# Patient Record
Sex: FEMALE | Race: WHITE | NOT HISPANIC OR LATINO | Employment: FULL TIME | URBAN - METROPOLITAN AREA
[De-identification: names, ages, dates, MRNs, and addresses within clinical notes are randomized per-mention and may not be internally consistent; named-entity substitution may affect disease eponyms.]

---

## 2023-10-04 PROBLEM — R87.612 LOW GRADE SQUAMOUS INTRAEPITHELIAL LESION (LGSIL) ON CERVICAL PAP SMEAR: Status: ACTIVE | Noted: 2023-10-04

## 2023-10-06 PROBLEM — M99.04 SACROILIAC JOINT SOMATIC DYSFUNCTION: Status: ACTIVE | Noted: 2023-10-06

## 2023-10-06 PROBLEM — M79.9 POSTURAL STRAIN: Status: ACTIVE | Noted: 2023-10-06

## 2023-11-14 PROBLEM — G56.03 BILATERAL CARPAL TUNNEL SYNDROME: Status: ACTIVE | Noted: 2017-03-16

## 2023-11-14 PROBLEM — G89.29 CHRONIC BILATERAL LOW BACK PAIN WITHOUT SCIATICA: Status: ACTIVE | Noted: 2021-11-01

## 2023-11-14 PROBLEM — M19.90 INFLAMMATORY ARTHRITIS: Status: ACTIVE | Noted: 2022-12-23

## 2023-12-28 PROBLEM — F43.10 POSTTRAUMATIC STRESS DISORDER: Status: ACTIVE | Noted: 2021-06-03

## 2024-04-16 PROBLEM — M35.9 AUTOIMMUNE DISEASE (MULTI): Status: RESOLVED | Noted: 2023-12-30 | Resolved: 2024-04-16

## 2024-06-05 ENCOUNTER — TELEPHONE (OUTPATIENT)
Dept: OBSTETRICS AND GYNECOLOGY | Facility: CLINIC | Age: 34
End: 2024-06-05

## 2024-06-05 NOTE — TELEPHONE ENCOUNTER
Patient called in stating they have not heard from anyone about the referral that was put in during their last visit.

## 2024-06-18 DIAGNOSIS — R53.83 TIREDNESS: ICD-10-CM

## 2024-06-18 DIAGNOSIS — Z11.4 ENCOUNTER FOR HIV (HUMAN IMMUNODEFICIENCY VIRUS) TEST: Primary | ICD-10-CM

## 2024-06-19 PROCEDURE — 82533 TOTAL CORTISOL: CPT

## 2024-06-19 PROCEDURE — 87389 HIV-1 AG W/HIV-1&-2 AB AG IA: CPT

## 2024-06-20 DIAGNOSIS — Q67.5 CONGENITAL SCOLIOSIS: ICD-10-CM

## 2024-06-20 DIAGNOSIS — M54.42 CHRONIC LOW BACK PAIN WITH BILATERAL SCIATICA, UNSPECIFIED BACK PAIN LATERALITY: Primary | ICD-10-CM

## 2024-06-20 DIAGNOSIS — M54.41 CHRONIC LOW BACK PAIN WITH BILATERAL SCIATICA, UNSPECIFIED BACK PAIN LATERALITY: Primary | ICD-10-CM

## 2024-06-20 DIAGNOSIS — G89.29 CHRONIC LOW BACK PAIN WITH BILATERAL SCIATICA, UNSPECIFIED BACK PAIN LATERALITY: Primary | ICD-10-CM

## 2024-06-27 ENCOUNTER — ANESTHESIA EVENT (OUTPATIENT)
Dept: GASTROENTEROLOGY | Facility: HOSPITAL | Age: 34
End: 2024-06-27
Payer: COMMERCIAL

## 2024-06-27 ENCOUNTER — HOSPITAL ENCOUNTER (OUTPATIENT)
Dept: GASTROENTEROLOGY | Facility: HOSPITAL | Age: 34
Setting detail: OUTPATIENT SURGERY
Discharge: HOME | End: 2024-06-27
Payer: COMMERCIAL

## 2024-06-27 ENCOUNTER — ANESTHESIA (OUTPATIENT)
Dept: GASTROENTEROLOGY | Facility: HOSPITAL | Age: 34
End: 2024-06-27
Payer: COMMERCIAL

## 2024-06-27 VITALS
TEMPERATURE: 97.9 F | HEART RATE: 97 BPM | DIASTOLIC BLOOD PRESSURE: 73 MMHG | HEIGHT: 61 IN | WEIGHT: 293 LBS | SYSTOLIC BLOOD PRESSURE: 147 MMHG | BODY MASS INDEX: 55.32 KG/M2 | OXYGEN SATURATION: 95 % | RESPIRATION RATE: 26 BRPM

## 2024-06-27 DIAGNOSIS — R12 HEARTBURN: ICD-10-CM

## 2024-06-27 DIAGNOSIS — R11.2 NAUSEA AND VOMITING, UNSPECIFIED VOMITING TYPE: ICD-10-CM

## 2024-06-27 PROBLEM — G47.33 OSA ON CPAP: Status: ACTIVE | Noted: 2024-06-27

## 2024-06-27 PROCEDURE — 7100000009 HC PHASE TWO TIME - INITIAL BASE CHARGE

## 2024-06-27 PROCEDURE — 3700000001 HC GENERAL ANESTHESIA TIME - INITIAL BASE CHARGE

## 2024-06-27 PROCEDURE — 2500000004 HC RX 250 GENERAL PHARMACY W/ HCPCS (ALT 636 FOR OP/ED): Performed by: NURSE ANESTHETIST, CERTIFIED REGISTERED

## 2024-06-27 PROCEDURE — 2500000005 HC RX 250 GENERAL PHARMACY W/O HCPCS: Performed by: NURSE ANESTHETIST, CERTIFIED REGISTERED

## 2024-06-27 PROCEDURE — 3700000002 HC GENERAL ANESTHESIA TIME - EACH INCREMENTAL 1 MINUTE

## 2024-06-27 PROCEDURE — 7100000010 HC PHASE TWO TIME - EACH INCREMENTAL 1 MINUTE

## 2024-06-27 PROCEDURE — 2500000004 HC RX 250 GENERAL PHARMACY W/ HCPCS (ALT 636 FOR OP/ED): Performed by: STUDENT IN AN ORGANIZED HEALTH CARE EDUCATION/TRAINING PROGRAM

## 2024-06-27 PROCEDURE — 43239 EGD BIOPSY SINGLE/MULTIPLE: CPT | Performed by: STUDENT IN AN ORGANIZED HEALTH CARE EDUCATION/TRAINING PROGRAM

## 2024-06-27 RX ORDER — PROPOFOL 10 MG/ML
INJECTION, EMULSION INTRAVENOUS CONTINUOUS PRN
Status: DISCONTINUED | OUTPATIENT
Start: 2024-06-27 | End: 2024-06-27

## 2024-06-27 RX ORDER — LIDOCAINE HCL/PF 100 MG/5ML
SYRINGE (ML) INTRAVENOUS AS NEEDED
Status: DISCONTINUED | OUTPATIENT
Start: 2024-06-27 | End: 2024-06-27

## 2024-06-27 RX ORDER — SODIUM CHLORIDE, SODIUM LACTATE, POTASSIUM CHLORIDE, CALCIUM CHLORIDE 600; 310; 30; 20 MG/100ML; MG/100ML; MG/100ML; MG/100ML
20 INJECTION, SOLUTION INTRAVENOUS CONTINUOUS
Status: DISCONTINUED | OUTPATIENT
Start: 2024-06-27 | End: 2024-06-28 | Stop reason: HOSPADM

## 2024-06-27 RX ORDER — MIDAZOLAM HYDROCHLORIDE 1 MG/ML
INJECTION, SOLUTION INTRAMUSCULAR; INTRAVENOUS AS NEEDED
Status: DISCONTINUED | OUTPATIENT
Start: 2024-06-27 | End: 2024-06-27

## 2024-06-27 SDOH — HEALTH STABILITY: MENTAL HEALTH: CURRENT SMOKER: 0

## 2024-06-27 ASSESSMENT — PAIN - FUNCTIONAL ASSESSMENT
PAIN_FUNCTIONAL_ASSESSMENT: 0-10

## 2024-06-27 ASSESSMENT — PAIN SCALES - GENERAL
PAINLEVEL_OUTOF10: 0 - NO PAIN
PAIN_LEVEL: 0

## 2024-06-27 ASSESSMENT — COLUMBIA-SUICIDE SEVERITY RATING SCALE - C-SSRS
2. HAVE YOU ACTUALLY HAD ANY THOUGHTS OF KILLING YOURSELF?: NO
6. HAVE YOU EVER DONE ANYTHING, STARTED TO DO ANYTHING, OR PREPARED TO DO ANYTHING TO END YOUR LIFE?: NO
6. HAVE YOU EVER DONE ANYTHING, STARTED TO DO ANYTHING, OR PREPARED TO DO ANYTHING TO END YOUR LIFE?: NO
2. HAVE YOU ACTUALLY HAD ANY THOUGHTS OF KILLING YOURSELF?: NO
2. HAVE YOU ACTUALLY HAD ANY THOUGHTS OF KILLING YOURSELF?: NO
1. IN THE PAST MONTH, HAVE YOU WISHED YOU WERE DEAD OR WISHED YOU COULD GO TO SLEEP AND NOT WAKE UP?: NO
6. HAVE YOU EVER DONE ANYTHING, STARTED TO DO ANYTHING, OR PREPARED TO DO ANYTHING TO END YOUR LIFE?: NO
1. IN THE PAST MONTH, HAVE YOU WISHED YOU WERE DEAD OR WISHED YOU COULD GO TO SLEEP AND NOT WAKE UP?: NO
1. IN THE PAST MONTH, HAVE YOU WISHED YOU WERE DEAD OR WISHED YOU COULD GO TO SLEEP AND NOT WAKE UP?: NO

## 2024-06-27 NOTE — DISCHARGE INSTRUCTIONS

## 2024-06-27 NOTE — ANESTHESIA POSTPROCEDURE EVALUATION
"Patient: Keerthi Lui \"Sasha\"    Procedure Summary       Date: 06/27/24 Room / Location: [facility]    Anesthesia Start: 1442 Anesthesia Stop: 1500    Procedure: EGD Diagnosis:       Heartburn      Nausea and vomiting, unspecified vomiting type    Scheduled Providers: Jose Lua MD Responsible Provider: Blane Garg MD    Anesthesia Type: MAC ASA Status: 3            Anesthesia Type: MAC    Vitals Value Taken Time   /76 06/27/24 1516   Temp 36.6 °C (97.9 °F) 06/27/24 1500   Pulse 96 06/27/24 1522   Resp 23 06/27/24 1522   SpO2 97 % 06/27/24 1522   Vitals shown include unfiled device data.    Anesthesia Post Evaluation    Patient location during evaluation: PACU  Patient participation: waiting for patient participation  Level of consciousness: awake  Pain score: 0  Pain management: adequate  Airway patency: patent  Cardiovascular status: acceptable and hemodynamically stable  Respiratory status: face mask  Hydration status: acceptable  Postoperative Nausea and Vomiting: none        No notable events documented.    "

## 2024-06-27 NOTE — ANESTHESIA PREPROCEDURE EVALUATION
"Patient: Keerthi Lui \"Sasha\"    Procedure Information       Date/Time: 06/27/24 1350    Scheduled providers: Jose Lua MD    Procedure: EGD    Location: [facility]            Relevant Problems   Pulmonary   (+) MIKAELA on CPAP      Neuro   (+) Anxiety   (+) Bilateral carpal tunnel syndrome   (+) Chronic daily headache   (+) Depression   (+) PTSD (post-traumatic stress disorder)   (+) Posttraumatic stress disorder   (+) Recurrent major depressive disorder, in remission ([facility])      GI   (+) Esophageal reflux   (+) Gastroesophageal reflux disease      Endocrine   (+) Severe obesity (Multi)      Musculoskeletal   (+) Bilateral carpal tunnel syndrome   (+) Chronic low back pain with sciatica   (+) Chronic neck pain   (+) Fibromyalgia      ID   (+) Recurrent streptococcal tonsillitis   (+) Streptococcal tonsillitis       Clinical information reviewed:   Tobacco  Allergies  Meds  Problems  Med Hx  Surg Hx  OB Status    Fam Hx  Soc Hx        NPO Detail:  NPO/Void Status  Carbohydrate Drink Given Prior to Surgery? : N  Date of Last Liquid: 06/27/24  Time of Last Liquid: 1315  Date of Last Solid: 06/26/24  Time of Last Solid: 2200  Last Intake Type: Solid meal  Time of Last Void: 1323         Physical Exam    Airway  Mallampati: IV  TM distance: <3 FB  Neck ROM: full     Cardiovascular - normal exam  Rhythm: regular  Rate: normal     Dental - normal exam     Pulmonary   Breath sounds clear to auscultation  (+) decreased breath sounds     Abdominal   (+) obese             Anesthesia Plan    History of general anesthesia?: yes  History of complications of general anesthesia?: no    ASA 3     MAC     The patient is not a current smoker.  Education provided regarding risk of obstructive sleep apnea.  intravenous induction   Anesthetic plan and risks discussed with patient.  Use of blood products discussed with who consented to blood products.    Plan discussed with CRNA.      "

## 2024-06-27 NOTE — POST-PROCEDURE NOTE
Dr. Lua at bedside post procedure to speak with pt and family. Discharge instructions reviewed and provided in folder to take home.

## 2024-07-01 ENCOUNTER — INFUSION (OUTPATIENT)
Dept: INFUSION THERAPY | Facility: CLINIC | Age: 34
End: 2024-07-01
Payer: COMMERCIAL

## 2024-07-01 VITALS
HEART RATE: 94 BPM | OXYGEN SATURATION: 99 % | RESPIRATION RATE: 22 BRPM | DIASTOLIC BLOOD PRESSURE: 99 MMHG | TEMPERATURE: 98.6 F | SYSTOLIC BLOOD PRESSURE: 134 MMHG

## 2024-07-01 DIAGNOSIS — M79.7 FIBROMYALGIA: ICD-10-CM

## 2024-07-01 LAB — PREGNANCY TEST URINE, POC: NEGATIVE

## 2024-07-01 PROCEDURE — 81025 URINE PREGNANCY TEST: CPT

## 2024-07-01 PROCEDURE — 96365 THER/PROPH/DIAG IV INF INIT: CPT | Mod: INF

## 2024-07-01 PROCEDURE — 2500000004 HC RX 250 GENERAL PHARMACY W/ HCPCS (ALT 636 FOR OP/ED): Performed by: NURSE PRACTITIONER

## 2024-07-01 PROCEDURE — 96368 THER/DIAG CONCURRENT INF: CPT | Mod: INF

## 2024-07-01 PROCEDURE — 2500000005 HC RX 250 GENERAL PHARMACY W/O HCPCS: Performed by: NURSE PRACTITIONER

## 2024-07-01 PROCEDURE — 96375 TX/PRO/DX INJ NEW DRUG ADDON: CPT | Mod: INF

## 2024-07-01 RX ORDER — EPINEPHRINE 0.3 MG/.3ML
0.3 INJECTION SUBCUTANEOUS EVERY 5 MIN PRN
OUTPATIENT
Start: 2024-07-15

## 2024-07-01 RX ORDER — ALBUTEROL SULFATE 0.83 MG/ML
3 SOLUTION RESPIRATORY (INHALATION) AS NEEDED
OUTPATIENT
Start: 2024-07-15

## 2024-07-01 RX ORDER — NITROGLYCERIN 0.4 MG/1
0.4 TABLET SUBLINGUAL ONCE
OUTPATIENT
Start: 2024-07-15 | End: 2024-07-15

## 2024-07-01 RX ORDER — METOCLOPRAMIDE HYDROCHLORIDE 5 MG/ML
10 INJECTION INTRAMUSCULAR; INTRAVENOUS ONCE
OUTPATIENT
Start: 2024-07-15 | End: 2024-07-15

## 2024-07-01 RX ORDER — KETOROLAC TROMETHAMINE 30 MG/ML
30 INJECTION, SOLUTION INTRAMUSCULAR; INTRAVENOUS ONCE
OUTPATIENT
Start: 2024-07-15 | End: 2024-07-15

## 2024-07-01 RX ORDER — FAMOTIDINE 10 MG/ML
20 INJECTION INTRAVENOUS ONCE AS NEEDED
OUTPATIENT
Start: 2024-07-15

## 2024-07-01 RX ORDER — KETOROLAC TROMETHAMINE 30 MG/ML
30 INJECTION, SOLUTION INTRAMUSCULAR; INTRAVENOUS ONCE
Status: COMPLETED | OUTPATIENT
Start: 2024-07-01 | End: 2024-07-01

## 2024-07-01 RX ORDER — DIPHENHYDRAMINE HYDROCHLORIDE 50 MG/ML
50 INJECTION INTRAMUSCULAR; INTRAVENOUS AS NEEDED
OUTPATIENT
Start: 2024-07-15

## 2024-07-01 RX ORDER — METOPROLOL TARTRATE 25 MG/1
25 TABLET, FILM COATED ORAL ONCE
OUTPATIENT
Start: 2024-07-15 | End: 2024-07-15

## 2024-07-01 RX ORDER — ONDANSETRON HYDROCHLORIDE 2 MG/ML
4 INJECTION, SOLUTION INTRAVENOUS ONCE
OUTPATIENT
Start: 2024-07-15 | End: 2024-07-15

## 2024-07-01 RX ORDER — DIPHENHYDRAMINE HYDROCHLORIDE 50 MG/ML
25 INJECTION INTRAMUSCULAR; INTRAVENOUS ONCE
OUTPATIENT
Start: 2024-07-15 | End: 2024-07-15

## 2024-07-01 ASSESSMENT — ENCOUNTER SYMPTOMS
LOSS OF SENSATION IN FEET: 1
DEPRESSION: 1
OCCASIONAL FEELINGS OF UNSTEADINESS: 0

## 2024-07-01 ASSESSMENT — PAIN DESCRIPTION - LOCATION: LOCATION: GENERALIZED

## 2024-07-01 ASSESSMENT — PAIN SCALES - GENERAL
PAINLEVEL_OUTOF10: 3
PAINLEVEL_OUTOF10: 2

## 2024-07-01 NOTE — PROGRESS NOTES
S: Patient here for 19 opioid sparing pain infusion. Patient reports 75% reduction in pain after last infusion that lasted 2 weeks.    Purpose of pain infusion meds explained along with potential side effects.  Patient verbalized understanding.    B: Pain Issues - generalized    A: Patient currently has pain described on flow sheet documentation. Designated  is patient's partner Adrian.     R: Plan; Obtain IV access, do patient risk assessment, and start opioid sparing infusion as ordered. Monitoring for S/S of adverse reactions.

## 2024-07-01 NOTE — PATIENT INSTRUCTIONS
Today :We administered (ketamine 30 mg, lidocaine (Xylocaine) 20 mg/mL (2 %) 300 mg in sodium chloride 0.9% 500 mL IV), propofol (Diprivan) 100 mg in dextrose 5% 25 mL, and ketorolac.     For:   1. Fibromyalgia          Please read the  Medication Guide that was given to you and reviewed during todays visit.     (Tell all doctors including dentists that you are taking this medication)     Go to the emergency room or call 911 if:  -You have signs of allergic reaction:   -Rash, hives, itching.   -Swollen, blistered, peeling skin.   -Swelling of face, lips, mouth, tongue or throat.   -Tightness of chest, trouble breathing, swallowing or talking     Call your doctor:  - If IV / injection site gets red, warm, swollen, itchy or leaks fluid or pus.     (Leave dressing on your IV site for at least 2 hours and keep area clean and dry  - If you get sick or have symptoms of infection or are not feeling well for any reason.    (Wash your hands often, stay away from people who are sick)  - If you have side effects from your medication that do not go away or are bothersome.     (Refer to the teaching your nurse gave you for side effects to call your doctor about)    - Common side effects may include:  stuffy nose, headache, feeling tired, muscle aches, upset stomach  - Before receiving any vaccines     - Call [facility] at   If:  - You get sick, are on antibiotics, have had a recent vaccine, have surgery or dental work and your doctor wants your visit rescheduled.  - You need to cancel and reschedule your visit for any reason. Call at least 2 days before your visit if you need to cancel.   - Your insurance changes before your next visit.    (We will need to get approval from your new insurance. This can take up to two weeks.)     The [facility] is opened Monday thru Friday. We are closed on weekends and holidays.   Voice mail will take your call if the center is closed. If you leave a message  please allow 24 hours for a call back during weekdays. If you leave a message on a weekend/holiday, we will call you back the next business day.      [facility]      Pain Infusion Aftercare Instructions      1. It is normal to feel sedated, tired and low in energy after a pain infusion. DO NOT DRIVE, OPERATE ANY MACHINERY, OR MAKE ANY IMPORTANT DECISIONS FOR AT LEAST 24 HOURS AFTER THE INFUSION.     2. Call the pain center at [phone] with any problems, questions, or concerns.     3. Eat light after the infusion. If you feel queasy or sick to your stomach, laying down with your eyes closed may help. When you resume eating start with something mild like clear liquids, yogurt, applesauce, crackers, etc… Gradually advance to a regular diet.     4. Do not leave your house alone the evening of your pain infusion.     5. No alcohol or sedative medications, such as sleeping pills, for 24 hours after your pain infusion.     6. Resume all other prescribed medications unless directed otherwise by you physician.     7. If you have any medical emergencies, call 911 or go directly to the closest emergency room.

## 2024-07-09 LAB
LABORATORY COMMENT REPORT: NORMAL
PATH REPORT.FINAL DX SPEC: NORMAL
PATH REPORT.GROSS SPEC: NORMAL
PATH REPORT.RELEVANT HX SPEC: NORMAL
PATH REPORT.TOTAL CANCER: NORMAL

## 2024-07-11 DIAGNOSIS — M79.7 FIBROMYALGIA: Primary | ICD-10-CM

## 2024-07-11 RX ORDER — KETOROLAC TROMETHAMINE 30 MG/ML
30 INJECTION, SOLUTION INTRAMUSCULAR; INTRAVENOUS ONCE
OUTPATIENT
Start: 2024-07-18 | End: 2024-07-18

## 2024-07-11 RX ORDER — KETAMINE HCL IN NACL, ISO-OSM 100MG/10ML
SYRINGE (ML) INJECTION ONCE
OUTPATIENT
Start: 2024-07-18

## 2024-07-15 DIAGNOSIS — J01.10 ACUTE NON-RECURRENT FRONTAL SINUSITIS: Primary | ICD-10-CM

## 2024-07-15 DIAGNOSIS — E66.01 CLASS 3 SEVERE OBESITY DUE TO EXCESS CALORIES WITHOUT SERIOUS COMORBIDITY WITH BODY MASS INDEX (BMI) OF 50.0 TO 59.9 IN ADULT (MULTI): ICD-10-CM

## 2024-07-15 RX ORDER — AZITHROMYCIN 250 MG/1
TABLET, FILM COATED ORAL
Qty: 6 TABLET | Refills: 0 | Status: SHIPPED | OUTPATIENT
Start: 2024-07-15 | End: 2024-07-20

## 2024-07-16 ENCOUNTER — APPOINTMENT (OUTPATIENT)
Dept: NEUROSURGERY | Facility: CLINIC | Age: 34
End: 2024-07-16
Payer: COMMERCIAL

## 2024-07-17 PROBLEM — F41.1 GENERALIZED ANXIETY DISORDER: Status: ACTIVE | Noted: 2021-06-03

## 2024-07-17 PROBLEM — F34.1 DYSTHYMIC DISORDER: Status: ACTIVE | Noted: 2021-06-03

## 2024-07-18 ENCOUNTER — INFUSION (OUTPATIENT)
Dept: INFUSION THERAPY | Facility: CLINIC | Age: 34
End: 2024-07-18
Payer: COMMERCIAL

## 2024-07-18 VITALS
RESPIRATION RATE: 17 BRPM | SYSTOLIC BLOOD PRESSURE: 148 MMHG | OXYGEN SATURATION: 97 % | TEMPERATURE: 97.3 F | HEART RATE: 96 BPM | DIASTOLIC BLOOD PRESSURE: 90 MMHG

## 2024-07-18 DIAGNOSIS — M79.7 FIBROMYALGIA: ICD-10-CM

## 2024-07-18 LAB — PREGNANCY TEST URINE, POC: NEGATIVE

## 2024-07-18 PROCEDURE — 96368 THER/DIAG CONCURRENT INF: CPT | Mod: INF

## 2024-07-18 PROCEDURE — 2500000005 HC RX 250 GENERAL PHARMACY W/O HCPCS: Performed by: NURSE PRACTITIONER

## 2024-07-18 PROCEDURE — 96365 THER/PROPH/DIAG IV INF INIT: CPT | Mod: INF

## 2024-07-18 PROCEDURE — 96375 TX/PRO/DX INJ NEW DRUG ADDON: CPT | Mod: INF

## 2024-07-18 PROCEDURE — 2500000004 HC RX 250 GENERAL PHARMACY W/ HCPCS (ALT 636 FOR OP/ED): Performed by: NURSE PRACTITIONER

## 2024-07-18 PROCEDURE — 81025 URINE PREGNANCY TEST: CPT | Performed by: NURSE PRACTITIONER

## 2024-07-18 RX ORDER — DIPHENHYDRAMINE HYDROCHLORIDE 50 MG/ML
50 INJECTION INTRAMUSCULAR; INTRAVENOUS AS NEEDED
OUTPATIENT
Start: 2024-08-01

## 2024-07-18 RX ORDER — KETOROLAC TROMETHAMINE 30 MG/ML
30 INJECTION, SOLUTION INTRAMUSCULAR; INTRAVENOUS ONCE
Status: COMPLETED | OUTPATIENT
Start: 2024-07-18 | End: 2024-07-18

## 2024-07-18 RX ORDER — KETOROLAC TROMETHAMINE 30 MG/ML
30 INJECTION, SOLUTION INTRAMUSCULAR; INTRAVENOUS ONCE
Status: CANCELLED | OUTPATIENT
Start: 2024-08-01 | End: 2024-08-01

## 2024-07-18 RX ORDER — FAMOTIDINE 10 MG/ML
20 INJECTION INTRAVENOUS ONCE AS NEEDED
OUTPATIENT
Start: 2024-08-01

## 2024-07-18 RX ORDER — KETAMINE HCL IN NACL, ISO-OSM 100MG/10ML
SYRINGE (ML) INJECTION ONCE
Status: CANCELLED | OUTPATIENT
Start: 2024-08-01

## 2024-07-18 RX ORDER — ONDANSETRON HYDROCHLORIDE 2 MG/ML
4 INJECTION, SOLUTION INTRAVENOUS ONCE
OUTPATIENT
Start: 2024-08-01 | End: 2024-08-01

## 2024-07-18 RX ORDER — ALBUTEROL SULFATE 0.83 MG/ML
3 SOLUTION RESPIRATORY (INHALATION) AS NEEDED
OUTPATIENT
Start: 2024-08-01

## 2024-07-18 RX ORDER — DIPHENHYDRAMINE HYDROCHLORIDE 50 MG/ML
25 INJECTION INTRAMUSCULAR; INTRAVENOUS ONCE
OUTPATIENT
Start: 2024-08-01 | End: 2024-08-01

## 2024-07-18 RX ORDER — EPINEPHRINE 0.3 MG/.3ML
0.3 INJECTION SUBCUTANEOUS EVERY 5 MIN PRN
OUTPATIENT
Start: 2024-08-01

## 2024-07-18 RX ORDER — METOPROLOL TARTRATE 25 MG/1
25 TABLET, FILM COATED ORAL ONCE
OUTPATIENT
Start: 2024-08-01 | End: 2024-08-01

## 2024-07-18 RX ORDER — KETAMINE HCL IN NACL, ISO-OSM 100MG/10ML
SYRINGE (ML) INJECTION ONCE
Status: COMPLETED | OUTPATIENT
Start: 2024-07-18 | End: 2024-07-18

## 2024-07-18 RX ORDER — NITROGLYCERIN 0.4 MG/1
0.4 TABLET SUBLINGUAL ONCE
OUTPATIENT
Start: 2024-08-01 | End: 2024-08-01

## 2024-07-18 RX ORDER — METOCLOPRAMIDE HYDROCHLORIDE 5 MG/ML
10 INJECTION INTRAMUSCULAR; INTRAVENOUS ONCE
OUTPATIENT
Start: 2024-08-01 | End: 2024-08-01

## 2024-07-18 ASSESSMENT — ENCOUNTER SYMPTOMS
DEPRESSION: 1
LOSS OF SENSATION IN FEET: 1
OCCASIONAL FEELINGS OF UNSTEADINESS: 0

## 2024-07-18 ASSESSMENT — PAIN - FUNCTIONAL ASSESSMENT: PAIN_FUNCTIONAL_ASSESSMENT: 0-10

## 2024-07-18 ASSESSMENT — PAIN DESCRIPTION - DESCRIPTORS: DESCRIPTORS: ACHING;SORE

## 2024-07-18 ASSESSMENT — PAIN SCALES - GENERAL
PAINLEVEL_OUTOF10: 3
PAINLEVEL_OUTOF10: 0 - NO PAIN
PAINLEVEL_OUTOF10: 3

## 2024-07-18 NOTE — PROGRESS NOTES
Post infusion teaching provided. Patient verbalized understanding. VSS, Patient states pain is zero and tolerated pain infusion well without difficulty.

## 2024-07-18 NOTE — PROGRESS NOTES
S: Patient here for #20 opioid sparing pain infusion. Patient reports 50-65% reduction in pain after last infusion that lasted 1.5 weeks.    Purpose of pain infusion meds explained along with potential side effects.  Patient verbalized understanding.    B: Pain Issues    A: Patient currently has pain described on flow sheet documentation. Designated  is Adrian. Patient last ate solid food >10 hours ago, and had liquid >10 hours ago.    R: Plan; Obtain IV access, do patient risk assessment, and start opioid sparing infusion as ordered. Monitoring for S/S of adverse reactions.

## 2024-07-18 NOTE — PATIENT INSTRUCTIONS
[facility]      Pain Infusion Aftercare Instructions      1. It is normal to feel sedated, tired and low in energy after a pain infusion. DO NOT DRIVE, OPERATE ANY MACHINERY, OR MAKE ANY IMPORTANT DECISIONS FOR AT LEAST 24 HOURS AFTER THE INFUSION.     2. Call the pain center at [phone] with any problems, questions, or concerns.     3. Eat light after the infusion. If you feel queasy or sick to your stomach, laying down with your eyes closed may help. When you resume eating start with something mild like clear liquids, yogurt, applesauce, crackers, etc… Gradually advance to a regular diet.     4. Do not leave your house alone the evening of your pain infusion.     5. No alcohol or sedative medications, such as sleeping pills, for 24 hours after your pain infusion.     6. Resume all other prescribed medications unless directed otherwise by you physician.     7. If you have any medical emergencies, call 911 or go directly to the closest emergency room.Today :We administered ketamine 30 mg-lidocaine 300 mg, propofol, and ketorolac.     For:   1. Fibromyalgia         Your next appointment is due in:  8/1/24 pain infusion        Please read the  Medication Guide that was given to you and reviewed during todays visit.     (Tell all doctors including dentists that you are taking this medication)     Go to the emergency room or call 911 if:  -You have signs of allergic reaction:   -Rash, hives, itching.   -Swollen, blistered, peeling skin.   -Swelling of face, lips, mouth, tongue or throat.   -Tightness of chest, trouble breathing, swallowing or talking     Call your doctor:  - If IV / injection site gets red, warm, swollen, itchy or leaks fluid or pus.     (Leave dressing on your IV site for at least 2 hours and keep area clean and dry  - If you get sick or have symptoms of infection or are not feeling well for any reason.    (Wash your hands often, stay away from people who are sick)  -  If you have side effects from your medication that do not go away or are bothersome.     (Refer to the teaching your nurse gave you for side effects to call your doctor about)    - Common side effects may include:  stuffy nose, headache, feeling tired, muscle aches, upset stomach  - Before receiving any vaccines     - Call [facility] at   If:  - You get sick, are on antibiotics, have had a recent vaccine, have surgery or dental work and your doctor wants your visit rescheduled.  - You need to cancel and reschedule your visit for any reason. Call at least 2 days before your visit if you need to cancel.   - Your insurance changes before your next visit.    (We will need to get approval from your new insurance. This can take up to two weeks.)     [facility] is opened Monday thru Friday. We are closed on weekends and holidays.   Voice mail will take your call if the center is closed. If you leave a message please allow 24 hours for a call back during weekdays. If you leave a message on a weekend/holiday, we will call you back the next business day.

## 2024-07-23 ENCOUNTER — TELEPHONE (OUTPATIENT)
Dept: OBSTETRICS AND GYNECOLOGY | Facility: CLINIC | Age: 34
End: 2024-07-23
Payer: COMMERCIAL

## 2024-07-23 NOTE — TELEPHONE ENCOUNTER
Sent patient a [employer] message to notify her that she needs to make appointments when she would like to be tested.

## 2024-07-25 ENCOUNTER — APPOINTMENT (OUTPATIENT)
Dept: INTEGRATIVE MEDICINE | Facility: CLINIC | Age: 34
End: 2024-07-25
Payer: COMMERCIAL

## 2024-07-25 ENCOUNTER — ALLIED HEALTH (OUTPATIENT)
Dept: INTEGRATIVE MEDICINE | Facility: CLINIC | Age: 34
End: 2024-07-25
Payer: COMMERCIAL

## 2024-07-25 DIAGNOSIS — M54.2 CHRONIC NECK PAIN: ICD-10-CM

## 2024-07-25 DIAGNOSIS — M99.04 SACROILIAC JOINT SOMATIC DYSFUNCTION: ICD-10-CM

## 2024-07-25 DIAGNOSIS — M54.40 CHRONIC LOW BACK PAIN WITH SCIATICA, SCIATICA LATERALITY UNSPECIFIED, UNSPECIFIED BACK PAIN LATERALITY: ICD-10-CM

## 2024-07-25 DIAGNOSIS — M79.7 FIBROMYALGIA: ICD-10-CM

## 2024-07-25 DIAGNOSIS — G89.29 CHRONIC LOW BACK PAIN WITH SCIATICA, SCIATICA LATERALITY UNSPECIFIED, UNSPECIFIED BACK PAIN LATERALITY: ICD-10-CM

## 2024-07-25 DIAGNOSIS — M99.01 SOMATIC DYSFUNCTION OF CERVICAL REGION: Primary | ICD-10-CM

## 2024-07-25 DIAGNOSIS — M99.02 SOMATIC DYSFUNCTION OF THORACIC REGION: ICD-10-CM

## 2024-07-25 DIAGNOSIS — G43.001 MIGRAINE WITHOUT AURA AND WITH STATUS MIGRAINOSUS, NOT INTRACTABLE: ICD-10-CM

## 2024-07-25 DIAGNOSIS — M99.03 SOMATIC DYSFUNCTION OF LUMBAR REGION: ICD-10-CM

## 2024-07-25 DIAGNOSIS — G89.29 CHRONIC NECK PAIN: ICD-10-CM

## 2024-07-25 PROCEDURE — 98941 CHIROPRACT MANJ 3-4 REGIONS: CPT | Performed by: CHIROPRACTOR

## 2024-07-25 ASSESSMENT — ENCOUNTER SYMPTOMS
FREQUENCY: 0
CHEST TIGHTNESS: 0
DIARRHEA: 0
ABDOMINAL PAIN: 0
CONSTIPATION: 0
FEVER: 0
TROUBLE SWALLOWING: 0
FATIGUE: 0
CONFUSION: 0
JOINT SWELLING: 0

## 2024-07-25 NOTE — PROGRESS NOTES
Subjective   Patient ID: Sasha Longoria is a 33 y.o. female who presents today for full spine complaints, fibromyalgia.    18 overall for 2024 6/30 vpcy with new insurance as of 05/14/2024    HPI -the patient reports that she continues to experience neck, upper back, lower back and hip pain.  She reports feeling very sore today.  She reports that she had a massage in the time between today and her last appointment and recalls being very tender to palpation during the massage.  She will typically receive a myofascial release type of massage which she finds more helpful than any other style.  Her headaches persist.  Her Botox treatment will begin in August.    (10/05/23: the patient presents today per the referral of Dr. Suárez and pain management for evaluation and and management of chronic full spine complaints.  She has in the past been diagnosed with fibromyalgia.  She has dealt with pain for several years and has received chiropractic care in the past.  She did have mixed results with chiropractic care in the past, reporting a variation of instrument assisted manipulation and manual manipulation.  But she wished to attend chiropractic care again through a hospital-based provider.  She is under the care of pain management and will be starting infusions next week.  She is also on the wait list for infusions at [facility] if needed.  Overall, her pain levels remain moderate to severe.  Her pain is constant.  Symptoms in the lower back and hips seem to be the area of most intensity.  But she does continue to experience pain throughout the spine.  She experiences paresthesias in the upper and lower extremities bilaterally.  She has difficulty finding any comfortable positions.)    Review of Systems   Constitutional:  Negative for fatigue and fever.   HENT:  Negative for trouble swallowing.    Eyes:  Negative for visual disturbance.   Respiratory:  Negative for chest tightness.    Cardiovascular:   Negative for chest pain and leg swelling.   Gastrointestinal:  Negative for abdominal pain, constipation and diarrhea.   Genitourinary:  Negative for frequency.   Musculoskeletal:  Negative for joint swelling.   Neurological:  Negative for syncope.   Psychiatric/Behavioral:  Negative for confusion.    All other systems reviewed and are negative.      Objective     The patient is alert and oriented x 3 FHC.  Cranial nerves II-XII are grossly intact.  Difficulty with transitional movements is observed.  Rounded shoulders.  Increased thoracic kyphosis.  Increased lumbar lordosis.  Elevated left iliac crest.      Moderate-severe hypertonicity and tenderness is present in the suboccipitals, cervical paraspinals (L>R), scalenes, SCM, upper trapezius, levator scapula, rhomboids, thoracic paraspinals, lumbar paraspinals, quadratus lumborum, upper gluteals, piriformis, hamstrings, TFL.     Segmental joint dysfunction was assessed with motion palpation and is identified in the following areas:  Cervical: C2/3  Thoracic: T3/4, T4/5, T/6  Lumbopelvic: L4/5, L5/S1, Bilateral SI (PI on right).    Extremities: B femoracetabular joints, R>L.      Assessment/Plan   See diagnoses.  She is dealing with a chronic condition.    (Today's presentation is consistent with fibromyalgia/myofascial pain syndrome alongside postural strain and somatic dysfunction contributing to her pain syndrome.  Complicating factors include chronicity of symptoms as well as body habitus.  We will implement a trial of care to include various manipulative techniques which will range from instrument assisted to diversified.  We will utilize soft tissue techniques such as active release technique to the cervical spine musculature.  We will closely monitor their response to care to determine if any changes need to occur, if advanced imaging is needed, or if referral to other providers is appropriate.     She will also be receiving acupuncture and integrative  medicine consultation which is appropriate when considering her overall presentation.)    Today's treatment:  Pelvic blocking applied to left SI joint.  Low force P-A applied to the right SI joint.  Manual traction to lumbar spine.  Diversified manipulation to the involved cervical and thoracic segments.  Set-up tolerated prior to cervical spine manipulation.  LAD applied to B hips.    Integrative dry needling applied to B cervical paraspinals and  upper trapezii for 5 minutes (6 in/out).    (Advise patient on thoracic extension exercises.)     Treatment Plan:   The patient tolerated today's treatment with little or no additional discomfort and was instructed to contact the office for questions or concerns. Return within 3 weeks, sooner if needed.   Patient was informed of my leaving the office.  I will attempt to see her 1 more time before I leave and I also recommended she continue care with Dr. Tejada.      This chart note was generated using dictation software, and as such, there may be typographical errors present. Abbreviations may include CS for cervical spine, TS for thoracic spine, and LS for lumbar spine.

## 2024-07-29 RX ORDER — KETAMINE HCL IN NACL, ISO-OSM 100MG/10ML
SYRINGE (ML) INJECTION ONCE
Status: CANCELLED | OUTPATIENT
Start: 2024-08-01

## 2024-07-29 RX ORDER — KETOROLAC TROMETHAMINE 30 MG/ML
30 INJECTION, SOLUTION INTRAMUSCULAR; INTRAVENOUS ONCE
Status: CANCELLED | OUTPATIENT
Start: 2024-08-01 | End: 2024-08-01

## 2024-07-30 ENCOUNTER — APPOINTMENT (OUTPATIENT)
Dept: INTEGRATIVE MEDICINE | Facility: CLINIC | Age: 34
End: 2024-07-30
Payer: COMMERCIAL

## 2024-07-30 DIAGNOSIS — M54.2 CHRONIC NECK PAIN: ICD-10-CM

## 2024-07-30 DIAGNOSIS — M54.40 CHRONIC LOW BACK PAIN WITH SCIATICA, SCIATICA LATERALITY UNSPECIFIED, UNSPECIFIED BACK PAIN LATERALITY: ICD-10-CM

## 2024-07-30 DIAGNOSIS — M25.551 BILATERAL HIP PAIN: ICD-10-CM

## 2024-07-30 DIAGNOSIS — M25.552 BILATERAL HIP PAIN: ICD-10-CM

## 2024-07-30 DIAGNOSIS — M99.02 SOMATIC DYSFUNCTION OF THORACIC REGION: ICD-10-CM

## 2024-07-30 DIAGNOSIS — M99.03 SOMATIC DYSFUNCTION OF LUMBAR REGION: ICD-10-CM

## 2024-07-30 DIAGNOSIS — M99.04 SACROILIAC JOINT SOMATIC DYSFUNCTION: ICD-10-CM

## 2024-07-30 DIAGNOSIS — G89.29 CHRONIC NECK PAIN: ICD-10-CM

## 2024-07-30 DIAGNOSIS — G89.29 CHRONIC LOW BACK PAIN WITH SCIATICA, SCIATICA LATERALITY UNSPECIFIED, UNSPECIFIED BACK PAIN LATERALITY: ICD-10-CM

## 2024-07-30 DIAGNOSIS — M99.01 SOMATIC DYSFUNCTION OF CERVICAL REGION: Primary | ICD-10-CM

## 2024-07-30 PROCEDURE — 98941 CHIROPRACT MANJ 3-4 REGIONS: CPT | Performed by: CHIROPRACTOR

## 2024-07-30 ASSESSMENT — ENCOUNTER SYMPTOMS
FEVER: 0
TROUBLE SWALLOWING: 0
ABDOMINAL PAIN: 0
FATIGUE: 0
CHEST TIGHTNESS: 0
CONSTIPATION: 0
DIARRHEA: 0
JOINT SWELLING: 0
FREQUENCY: 0
CONFUSION: 0

## 2024-07-30 NOTE — PROGRESS NOTES
Subjective   Patient ID: Sasha Longoria is a 33 y.o. female who presents today for full spine complaints, fibromyalgia.    19 overall for 2024 7/30 vpcy with new insurance as of 05/14/2024    HPI -the patient reports that she is feeling similar to last visit.  She continues to feel very stiff and sore throughout the spine, especially in the neck.  She did have a recent massage session and recalls it being difficult based on cervical spine tenderness.  Headaches persist.  She will begin Botox injections in late August.  She continues with infusion therapy as well.    (10/05/23: the patient presents today per the referral of Dr. Suárez and pain management for evaluation and and management of chronic full spine complaints.  She has in the past been diagnosed with fibromyalgia.  She has dealt with pain for several years and has received chiropractic care in the past.  She did have mixed results with chiropractic care in the past, reporting a variation of instrument assisted manipulation and manual manipulation.  But she wished to attend chiropractic care again through a hospital-based provider.  She is under the care of pain management and will be starting infusions next week.  She is also on the wait list for infusions at [facility] if needed.  Overall, her pain levels remain moderate to severe.  Her pain is constant.  Symptoms in the lower back and hips seem to be the area of most intensity.  But she does continue to experience pain throughout the spine.  She experiences paresthesias in the upper and lower extremities bilaterally.  She has difficulty finding any comfortable positions.)    Review of Systems   Constitutional:  Negative for fatigue and fever.   HENT:  Negative for trouble swallowing.    Eyes:  Negative for visual disturbance.   Respiratory:  Negative for chest tightness.    Cardiovascular:  Negative for chest pain and leg swelling.   Gastrointestinal:  Negative for abdominal pain,  constipation and diarrhea.   Genitourinary:  Negative for frequency.   Musculoskeletal:  Negative for joint swelling.   Neurological:  Negative for syncope.   Psychiatric/Behavioral:  Negative for confusion.    All other systems reviewed and are negative.      Objective     The patient is alert and oriented x 3 FHC.  Cranial nerves II-XII are grossly intact.  Difficulty with transitional movements is observed.  Rounded shoulders.  Increased thoracic kyphosis.  Increased lumbar lordosis.  Elevated left iliac crest.      Moderate-severe hypertonicity and tenderness is present in the suboccipitals, cervical paraspinals (L>R), scalenes, SCM, upper trapezius, levator scapula, rhomboids, thoracic paraspinals, lumbar paraspinals, quadratus lumborum, upper gluteals, piriformis, hamstrings, TFL.     Segmental joint dysfunction was assessed with motion palpation and is identified in the following areas:  Cervical: C2/3  Thoracic: T3/4, T4/5, T/6  Lumbopelvic: L4/5, L5/S1, Bilateral SI (PI on right).    Extremities: B femoracetabular joints, R>L.      Assessment/Plan   See diagnoses.  She is dealing with a chronic condition.    (Today's presentation is consistent with fibromyalgia/myofascial pain syndrome alongside postural strain and somatic dysfunction contributing to her pain syndrome.  Complicating factors include chronicity of symptoms as well as body habitus.  We will implement a trial of care to include various manipulative techniques which will range from instrument assisted to diversified.  We will utilize soft tissue techniques such as active release technique to the cervical spine musculature.  We will closely monitor their response to care to determine if any changes need to occur, if advanced imaging is needed, or if referral to other providers is appropriate.     She will also be receiving acupuncture and integrative medicine consultation which is appropriate when considering her overall presentation.)    Today's  treatment:  Pelvic blocking applied to right SI joint.  Low force P-A applied to the right SI joint.  Manual traction to lumbar spine.  Diversified manipulation to the involved cervical and thoracic segments.  Set-up tolerated prior to cervical spine manipulation.  LAD applied to B hips.    Integrative dry needling applied to B cervical paraspinals and  upper trapezii for 5 minutes (4 in/out). Very reactive to IDN today.    (Advise patient on thoracic extension exercises.)     Treatment Plan:   The patient tolerated today's treatment with little or no additional discomfort and was instructed to contact the office for questions or concerns. Return within 3 weeks, sooner if needed.   (Patient was informed of my leaving the office.  I will attempt to see her 1 more time before I leave and I also recommended she continue care with Dr. Tejada.)      This chart note was generated using dictation software, and as such, there may be typographical errors present. Abbreviations may include CS for cervical spine, TS for thoracic spine, and LS for lumbar spine.

## 2024-08-01 ENCOUNTER — INFUSION (OUTPATIENT)
Dept: INFUSION THERAPY | Facility: CLINIC | Age: 34
End: 2024-08-01
Payer: COMMERCIAL

## 2024-08-01 VITALS
RESPIRATION RATE: 16 BRPM | TEMPERATURE: 97.7 F | OXYGEN SATURATION: 95 % | DIASTOLIC BLOOD PRESSURE: 83 MMHG | SYSTOLIC BLOOD PRESSURE: 124 MMHG | HEART RATE: 97 BPM

## 2024-08-01 DIAGNOSIS — M79.7 FIBROMYALGIA: ICD-10-CM

## 2024-08-01 LAB — PREGNANCY TEST URINE, POC: NEGATIVE

## 2024-08-01 PROCEDURE — 2500000005 HC RX 250 GENERAL PHARMACY W/O HCPCS: Performed by: NURSE PRACTITIONER

## 2024-08-01 PROCEDURE — 96368 THER/DIAG CONCURRENT INF: CPT | Mod: INF

## 2024-08-01 PROCEDURE — 96365 THER/PROPH/DIAG IV INF INIT: CPT | Mod: INF

## 2024-08-01 PROCEDURE — 81025 URINE PREGNANCY TEST: CPT

## 2024-08-01 PROCEDURE — 2500000004 HC RX 250 GENERAL PHARMACY W/ HCPCS (ALT 636 FOR OP/ED): Performed by: NURSE PRACTITIONER

## 2024-08-01 PROCEDURE — 96375 TX/PRO/DX INJ NEW DRUG ADDON: CPT | Mod: INF

## 2024-08-01 RX ORDER — DIPHENHYDRAMINE HYDROCHLORIDE 50 MG/ML
25 INJECTION INTRAMUSCULAR; INTRAVENOUS ONCE
OUTPATIENT
Start: 2024-08-15 | End: 2024-08-15

## 2024-08-01 RX ORDER — ALBUTEROL SULFATE 0.83 MG/ML
3 SOLUTION RESPIRATORY (INHALATION) AS NEEDED
OUTPATIENT
Start: 2024-08-15

## 2024-08-01 RX ORDER — DIPHENHYDRAMINE HYDROCHLORIDE 50 MG/ML
50 INJECTION INTRAMUSCULAR; INTRAVENOUS AS NEEDED
OUTPATIENT
Start: 2024-08-15

## 2024-08-01 RX ORDER — KETOROLAC TROMETHAMINE 30 MG/ML
30 INJECTION, SOLUTION INTRAMUSCULAR; INTRAVENOUS ONCE
Status: COMPLETED | OUTPATIENT
Start: 2024-08-01 | End: 2024-08-01

## 2024-08-01 RX ORDER — METOCLOPRAMIDE HYDROCHLORIDE 5 MG/ML
10 INJECTION INTRAMUSCULAR; INTRAVENOUS ONCE
OUTPATIENT
Start: 2024-08-15 | End: 2024-08-15

## 2024-08-01 RX ORDER — KETAMINE HCL IN NACL, ISO-OSM 100MG/10ML
SYRINGE (ML) INJECTION ONCE
OUTPATIENT
Start: 2024-08-15

## 2024-08-01 RX ORDER — EPINEPHRINE 0.3 MG/.3ML
0.3 INJECTION SUBCUTANEOUS EVERY 5 MIN PRN
OUTPATIENT
Start: 2024-08-15

## 2024-08-01 RX ORDER — METOPROLOL TARTRATE 25 MG/1
25 TABLET, FILM COATED ORAL ONCE
OUTPATIENT
Start: 2024-08-15 | End: 2024-08-15

## 2024-08-01 RX ORDER — NITROGLYCERIN 0.4 MG/1
0.4 TABLET SUBLINGUAL ONCE
OUTPATIENT
Start: 2024-08-15 | End: 2024-08-15

## 2024-08-01 RX ORDER — FAMOTIDINE 10 MG/ML
20 INJECTION INTRAVENOUS ONCE AS NEEDED
OUTPATIENT
Start: 2024-08-15

## 2024-08-01 RX ORDER — KETOROLAC TROMETHAMINE 30 MG/ML
30 INJECTION, SOLUTION INTRAMUSCULAR; INTRAVENOUS ONCE
OUTPATIENT
Start: 2024-08-15 | End: 2024-08-15

## 2024-08-01 RX ORDER — KETAMINE HCL IN NACL, ISO-OSM 100MG/10ML
SYRINGE (ML) INJECTION ONCE
Status: COMPLETED | OUTPATIENT
Start: 2024-08-01 | End: 2024-08-01

## 2024-08-01 RX ORDER — ONDANSETRON HYDROCHLORIDE 2 MG/ML
4 INJECTION, SOLUTION INTRAVENOUS ONCE
OUTPATIENT
Start: 2024-08-15 | End: 2024-08-15

## 2024-08-01 ASSESSMENT — COLUMBIA-SUICIDE SEVERITY RATING SCALE - C-SSRS
1. IN THE PAST MONTH, HAVE YOU WISHED YOU WERE DEAD OR WISHED YOU COULD GO TO SLEEP AND NOT WAKE UP?: NO
6. HAVE YOU EVER DONE ANYTHING, STARTED TO DO ANYTHING, OR PREPARED TO DO ANYTHING TO END YOUR LIFE?: NO
6. HAVE YOU EVER DONE ANYTHING, STARTED TO DO ANYTHING, OR PREPARED TO DO ANYTHING TO END YOUR LIFE?: NO
2. HAVE YOU ACTUALLY HAD ANY THOUGHTS OF KILLING YOURSELF?: NO

## 2024-08-01 ASSESSMENT — PAIN SCALES - GENERAL
PAINLEVEL_OUTOF10: 5 - MODERATE PAIN
PAINLEVEL_OUTOF10: 2
PAINLEVEL_OUTOF10: 5 - MODERATE PAIN

## 2024-08-01 ASSESSMENT — ENCOUNTER SYMPTOMS
DEPRESSION: 1
OCCASIONAL FEELINGS OF UNSTEADINESS: 1
LOSS OF SENSATION IN FEET: 1

## 2024-08-01 NOTE — PATIENT INSTRUCTIONS
Today :Sasha Longoria had no medications administered during this visit.     For:   1. Fibromyalgia              (Tell all doctors including dentists that you are taking this medication)     Go to the emergency room or call 911 if:  -You have signs of allergic reaction:   -Rash, hives, itching.   -Swollen, blistered, peeling skin.   -Swelling of face, lips, mouth, tongue or throat.   -Tightness of chest, trouble breathing, swallowing or talking     Call your doctor:  - If IV / injection site gets red, warm, swollen, itchy or leaks fluid or pus.     (Leave dressing on your IV site for at least 2 hours and keep area clean and dry  - If you get sick or have symptoms of infection or are not feeling well for any reason.    (Wash your hands often, stay away from people who are sick)  - If you have side effects from your medication that do not go away or are bothersome.     (Refer to the teaching your nurse gave you for side effects to call your doctor about)    - Common side effects may include:  stuffy nose, headache, feeling tired, muscle aches, upset stomach  - Before receiving any vaccines     - Call [facility] at   If:  - You get sick, are on antibiotics, have had a recent vaccine, have surgery or dental work and your doctor wants your visit rescheduled.  - You need to cancel and reschedule your visit for any reason. Call at least 2 days before your visit if you need to cancel.   - Your insurance changes before your next visit.    (We will need to get approval from your new insurance. This can take up to two weeks.)     The [facility] is opened Monday thru Friday. We are closed on weekends and holidays.   Voice mail will take your call if the center is closed. If you leave a message please allow 24 hours for a call back during weekdays. If you leave a message on a weekend/holiday, we will call you back the next business day.              [facility] OUTPATIENT [place]      Pain Infusion  Aftercare Instructions      1. It is normal to feel sedated, tired and low in energy after a pain infusion. DO NOT DRIVE, OPERATE ANY MACHINERY, OR MAKE ANY IMPORTANT DECISIONS FOR AT LEAST 24 HOURS AFTER THE INFUSION.     2. Call the pain center at [phone] with any problems, questions, or concerns.     3. Eat light after the infusion. If you feel queasy or sick to your stomach, laying down with your eyes closed may help. When you resume eating start with something mild like clear liquids, yogurt, applesauce, crackers, etc… Gradually advance to a regular diet.     4. Do not leave your house alone the evening of your pain infusion.     5. No alcohol or sedative medications, such as sleeping pills, for 24 hours after your pain infusion.     6. Resume all other prescribed medications unless directed otherwise by you physician.     7. If you have any medical emergencies, call 911 or go directly to the closest emergency room.

## 2024-08-02 ENCOUNTER — TELEMEDICINE (OUTPATIENT)
Dept: OBSTETRICS AND GYNECOLOGY | Facility: CLINIC | Age: 34
End: 2024-08-02
Payer: COMMERCIAL

## 2024-08-02 VITALS — HEIGHT: 61 IN | BODY MASS INDEX: 55.32 KG/M2 | WEIGHT: 293 LBS

## 2024-08-02 DIAGNOSIS — N89.8 VAGINAL IRRITATION: Primary | ICD-10-CM

## 2024-08-02 PROCEDURE — 3008F BODY MASS INDEX DOCD: CPT | Performed by: ADVANCED PRACTICE MIDWIFE

## 2024-08-02 PROCEDURE — 1036F TOBACCO NON-USER: CPT | Performed by: ADVANCED PRACTICE MIDWIFE

## 2024-08-02 PROCEDURE — 99212 OFFICE O/P EST SF 10 MIN: CPT | Performed by: ADVANCED PRACTICE MIDWIFE

## 2024-08-02 RX ORDER — TERCONAZOLE 4 MG/G
1 CREAM VAGINAL NIGHTLY
Qty: 45 G | Refills: 1 | Status: SHIPPED | OUTPATIENT
Start: 2024-08-02 | End: 2024-08-09

## 2024-08-02 RX ORDER — LANOLIN ALCOHOL/MO/W.PET/CERES
CREAM (GRAM) TOPICAL
COMMUNITY
Start: 2024-05-09

## 2024-08-02 RX ORDER — CLOBETASOL PROPIONATE 0.5 MG/G
OINTMENT TOPICAL 2 TIMES DAILY
Qty: 60 G | Refills: 1 | Status: SHIPPED | OUTPATIENT
Start: 2024-08-02 | End: 2024-08-02 | Stop reason: WASHOUT

## 2024-08-02 NOTE — PROGRESS NOTES
"Chief complaint: vaginal irritation  HPI: Vaginal irritation intermittently for the a couple of weeks. Sensitive skin in vaginal area, typically uses Clobetasol, but not helping this time. Related symptoms pain with urination and intercourse, scant blood on tissue with wiping x 1. Previously waxed area no does \"sugaring\" for hair removal has not done that in 2 months . Was on abx for sinus infection started July 15  LMP: Absent   Sexually active: Yes, [facility]   ROS: all negative     PE:Constitutional: no acute distress, well nourished, alert and oriented  Head and Neck: normocephalic  Lungs: respirations unlabored  Neuropsych: normal mood affect, well groomed good eye contact  Muskuloskeletal: normal posture  A: yeast candidiasis secondary to abx  P; terazole 7 day cream can not  take diflucan \"gives stomachache:\"      Refill on clobetesol per pt request         "

## 2024-08-06 ENCOUNTER — APPOINTMENT (OUTPATIENT)
Dept: INTEGRATIVE MEDICINE | Facility: CLINIC | Age: 34
End: 2024-08-06
Payer: COMMERCIAL

## 2024-08-06 DIAGNOSIS — Z71.3 NUTRITIONAL COUNSELING: Primary | ICD-10-CM

## 2024-08-06 DIAGNOSIS — M79.7 FIBROMYALGIA: ICD-10-CM

## 2024-08-06 DIAGNOSIS — R51.9 CHRONIC DAILY HEADACHE: ICD-10-CM

## 2024-08-06 DIAGNOSIS — Z91.89 AT RISK FOR NAUSEA: Primary | ICD-10-CM

## 2024-08-06 PROCEDURE — 99213 OFFICE O/P EST LOW 20 MIN: CPT | Performed by: NURSE PRACTITIONER

## 2024-08-06 RX ORDER — ONDANSETRON 4 MG/1
8 TABLET, FILM COATED ORAL EVERY 8 HOURS PRN
Qty: 20 TABLET | Refills: 0 | Status: SHIPPED | OUTPATIENT
Start: 2024-08-06

## 2024-08-06 NOTE — PROGRESS NOTES
34 yo female following up from Susanna Anand patient, PMH: Fibromyalgia, PTSD, GERD, anxiety, depression, arthritis    -having a glucose monitor to measure types of food that she should consume. Going to go over results with Briseida Coppola    -Metformin- no appetite, barely eating, a lot of food aversion. Came off of that. Eventually goal is to get back on Metformin, goal is lower and ER.     -Weight loss medication talk with Briseida next month.     Had an intake apt with functional medicine practitioner. Done a lot over the last year, she feels like there is still improvement to be made. She will be seeing functional medicine - Dr. Pinky Aguilar. Still working on diet.     Succesess:   -Trying to buy more gluten free and dairy free  -Overall less generalized pain.     Weaknessess:   Relationsh

## 2024-08-08 ENCOUNTER — CLINICAL SUPPORT (OUTPATIENT)
Dept: PRIMARY CARE | Facility: CLINIC | Age: 34
End: 2024-08-08
Payer: COMMERCIAL

## 2024-08-08 ENCOUNTER — APPOINTMENT (OUTPATIENT)
Dept: PRIMARY CARE | Facility: CLINIC | Age: 34
End: 2024-08-08
Payer: COMMERCIAL

## 2024-08-08 DIAGNOSIS — R68.89 FLU-LIKE SYMPTOMS: Primary | ICD-10-CM

## 2024-08-08 DIAGNOSIS — U07.1 COVID-19: Primary | ICD-10-CM

## 2024-08-08 LAB
POC RAPID INFLUENZA A: NEGATIVE
POC RAPID INFLUENZA B: NEGATIVE
POC SARS-COV-2 AG BINAX: ABNORMAL

## 2024-08-08 PROCEDURE — 87811 SARS-COV-2 COVID19 W/OPTIC: CPT | Performed by: NURSE PRACTITIONER

## 2024-08-08 PROCEDURE — 87804 INFLUENZA ASSAY W/OPTIC: CPT | Performed by: NURSE PRACTITIONER

## 2024-08-08 RX ORDER — NIRMATRELVIR AND RITONAVIR 300-100 MG
3 KIT ORAL 2 TIMES DAILY
Qty: 30 TABLET | Refills: 0 | Status: SHIPPED | OUTPATIENT
Start: 2024-08-08 | End: 2024-08-08 | Stop reason: WASHOUT

## 2024-08-08 RX ORDER — NIRMATRELVIR AND RITONAVIR 300-100 MG
3 KIT ORAL 2 TIMES DAILY
Qty: 30 TABLET | Refills: 0 | Status: SHIPPED | OUTPATIENT
Start: 2024-08-08 | End: 2024-08-13

## 2024-08-13 ENCOUNTER — APPOINTMENT (OUTPATIENT)
Dept: NEUROSURGERY | Facility: CLINIC | Age: 34
End: 2024-08-13
Payer: COMMERCIAL

## 2024-08-13 ENCOUNTER — APPOINTMENT (OUTPATIENT)
Dept: INTEGRATIVE MEDICINE | Facility: CLINIC | Age: 34
End: 2024-08-13
Payer: COMMERCIAL

## 2024-08-13 DIAGNOSIS — Z91.89 AT RISK FOR NAUSEA: ICD-10-CM

## 2024-08-13 RX ORDER — ONDANSETRON 4 MG/1
8 TABLET, FILM COATED ORAL EVERY 8 HOURS PRN
Qty: 20 TABLET | Refills: 0 | Status: SHIPPED | OUTPATIENT
Start: 2024-08-13

## 2024-08-13 NOTE — PROGRESS NOTES
"  Patient ID: Sasha Longoria is a 33 y.o. female who presents for No chief complaint on file..    Referring Provider: STEPHANIE Noriega    Pt was referred for weight managment, insulin resistance, interested in Continuous glucose monitor   Last visit with referring provider: 3/1/24    Subjective     Preferred Pharmacy:    [id number] - [place], [place] - [address]  [address] [address]  Phone: [phone] Fax: [phone]    [facility] - [place], [place] - [address]  [address] [address]  Phone: [phone] Fax: [phone]    [facility] - [facility] [address]  [address]  [facility] [address]  Phone: [phone] Fax: [phone]    [id number] - [place], [place] - [address]   [address] [place] [address]  Phone: [phone] Fax: [phone]      Adherence:   Do you have copays on your medications? Yes  Do you have trouble affording your current medications? No, but in \"limbo\" was laid off, still looking for a job. Has previous employers insurance currently through [employer] (has ~2 more months remaining).   How many doses of medication did you miss in the last 7 days? 1 dose, morning most often, so many pills, gets distracted and forgets to come back to take all meds.     Subjective/Objective:      Patient identified goal:   Increase endurance (VO2 Max is \"terrible\")  Energy to do ADL, regaining independence  More active, without needing a week of recovery time  More comfortable physically  \"A few years ago\" was at 147 lbs and felt good, today desires to be at a size that is healthy and manageable to maintain.     Onset:   When fibromyalgia was triggered, fall of 2020.   Lifetime of chronic stress and trauma. Traumatic event occurred at this time.   Used to be active, exercising 6 days/week, noticed she couldn't do what she usually could with weight training.     Past " "success:   X 2 years exercised 6 d/week with an active rest day along with caloric restriction.     Support network:   Partner  Therapist  Friends    How long implementing diet/lifestyle change for weight loss:   3 months this time, previously x 2 years.     Disordered eating patterns:   Binge eating disorder without purging - not current- when seeking comfort    Concurrent chronic conditions:   Lab Results   Component Value Date    CHHDL 3.0 02/20/2024    TRIG 129 02/20/2024       Prediabetes/Diabetes? Insulin resistance (TAURUS-IR 4.4)    Pertinent PMH Review:   PMH of Pancreatitis: No  PMH of Gallbladder disease: No  PMH of Delayed Gastric Emptying:  Unsure, hx of stomach cramps/pains/gassy/bloating  PMH of MTC: No  PMH of Retinopathy: No, visits eye doctor regularly because of \"potential retinal detachment in one eye\"   PMH of Urinary Tract Infections: No  PMH of Suicidal Ideation: Yes, past.   Female on oral contraceptive? IUD    Migraines? Yes, 12-13 years ago.   Allergies? Hives/Rash with intensive immune response, has had immune panels that came back negative. Sensitive skin, dermatitis.     Anxiety? Yes    Thyroid health:   Lab Results   Component Value Date    TSH 2.03 02/29/2024        Medications potentially contributing to weight gain:   Antipsychotics/Mood stabilizers/Antiepileptics/Nerve pain: gabapentin,      Medications tried/stopped for weight management:     None, past provider tried Wegovy but insurance denied because also on topiramate.     HPI    Objective     LMP  (LMP Unknown)      Labs  Lab Results   Component Value Date    BILITOT 0.3 02/29/2024    CALCIUM 9.3 02/29/2024    CO2 31 02/29/2024     02/29/2024    CREATININE 0.66 02/29/2024    GLUCOSE 100 (H) 02/29/2024    ALKPHOS 98 02/29/2024    K 4.7 02/29/2024    PROT 6.7 04/16/2024     02/29/2024    AST 11 02/29/2024    ALT 15 02/29/2024    BUN 16 02/29/2024    ANIONGAP 10 02/29/2024    ALBUMIN 4.2 02/29/2024     Lab Results " "  Component Value Date    TRIG 129 02/20/2024    CHOL 130 02/20/2024    LDLCALC 61 02/20/2024    HDL 43.5 02/20/2024     Lab Results   Component Value Date    HGBA1C 5.1 02/29/2024    HGBA1C 5.1 02/20/2024    HGBA1C 5.3 02/08/2022     The ASCVD Risk score (Yesenia DK, et al., 2019) failed to calculate for the following reasons:    The 2019 ASCVD risk score is only valid for ages 40 to 79  No results found for: \"VITD25\"    Current Outpatient Medications on File Prior to Visit   Medication Sig Dispense Refill    acetaminophen (Tylenol 8 HOUR) 650 mg ER tablet Take 1 tablet (650 mg) by mouth every 8 hours if needed for mild pain (1 - 3). Do not crush, chew, or split. Patient takes daily, up to 3000mg/day.      alpha lipoic acid 600 mg capsule Take by mouth. 1 capsule daily after meal 1 week  Then 2 capsules for 1 week  Then 3 capsules for 1 week  Then 4 capsules daily.      buPROPion XL (Wellbutrin XL) 300 mg 24 hr tablet TAKE 1 TABLET BY MOUTH EVERY DAY IN THE MORNING AS DIRECTED      candesartan (Atacand) 16 mg tablet Take 1 tablet (16 mg) by mouth once daily.      cholecalciferol (Vitamin D-3) 25 MCG (1000 UT) capsule Take 2 capsules (50 mcg) by mouth once daily.      clobetasol (Temovate) 0.05 % ointment once daily as needed.      coenzyme Q-10 (Co Q-10) 200 mg capsule Take 1 capsule (200 mg) by mouth 3 times a day.      dihydroergotamine (Trudhesa) 0.725 mg/pump act. (4 mg/mL) nasal spray Administer 1 spray into each nostril every 1 hour if needed for migraine. Dose may be repeated in 1 hour after the first dose. No more than 2 doses within 24 hours or 3 doses within 7 days. 6 each 3    DULoxetine (Cymbalta) 60 mg DR capsule Take 2 capsules (120 mg) by mouth. Do not crush or chew.      ferrous sulfate, 325 mg ferrous sulfate, tablet TAKE 1 TABLET BY MOUTH 4 TIMES A WEEK. DO NOT CRUSH, CHEW, OR SPLIT. 48 tablet 1    folic acid/vit B complex and C (B COMPLEX-VITAMIN C-FOLIC ACID ORAL) Take 1 tablet by mouth 2 times a " day.      gabapentin (Neurontin) 300 mg capsule Take 1 capsule (300 mg) by mouth 3 times a day. 270 capsule 0    ketoconazole (NIZOral) 2 % shampoo Lather onto scalp and let sit for 5 mins before rinsing once daily until improvement.  May decrease to once-twice weekly for maintenance.      leflunomide (Arava) 10 mg tablet       levonorgestrel (Mirena) 21 mcg/24 hours (8 yrs) 52 mg IUD by intrauterine route.      magnesium oxide (Mag-Ox) 400 mg (241.3 mg magnesium) tablet       nirmatrelvir-ritonavir (Paxlovid) 300 mg (150 mg x 2)-100 mg tablet therapy pack Take 3 tablets by mouth 2 times a day for 5 days. Follow the instructions on the package 30 tablet 0    omeprazole (PriLOSEC) 40 mg DR capsule Take 1 capsule (40 mg) by mouth once daily. Do not crush or chew. 30 capsule 11    ondansetron (Zofran) 4 mg tablet Take 2 tablets (8 mg) by mouth every 8 hours if needed for nausea or vomiting. 20 tablet 0    Qulipta 60 mg tablet tablet Take 1 tablet (60 mg) by mouth once daily. 30 tablet 3    sennosides (senna) 8.6 mg capsule Take 2 capsules (17.2 mg) by mouth once daily at bedtime. 60 capsule 11    simethicone (Mylicon) 80 mg chewable tablet Chew 1 tablet (80 mg) every 6 hours if needed for flatulence. 30 tablet 11    sulfaSALAzine (Azulfidine) 500 mg DR tablet       [] terconazole (Terazol 7) 0.4 % vaginal cream Insert 1 applicator into the vagina once daily at bedtime for 7 days. 45 g 1    trazodone HCl (TRAZODONE ORAL) Take 100 mg by mouth as needed at bedtime.      triamcinolone (Kenalog) 0.1 % ointment Apply to elbows twice daily only as needed for irritation.  Do not apply more than 21 days per month.      [DISCONTINUED] FreeStyle Lakhwinder 3 Sensor device Apply/Remove every 14 days as directed. 6 each 3    [DISCONTINUED] nirmatrelvir-ritonavir (Paxlovid) 300 mg (150 mg x 2)-100 mg tablet therapy pack Take 3 tablets by mouth 2 times a day for 5 days. Follow the instructions on the package 30 tablet 0     "[DISCONTINUED] nirmatrelvir-ritonavir (Paxlovid) 300 mg (150 mg x 2)-100 mg tablet therapy pack Take 3 tablets by mouth 2 times a day for 5 days. Follow the instructions on the package 30 tablet 0    [DISCONTINUED] ondansetron (Zofran) 4 mg tablet Take 2 tablets (8 mg) by mouth every 8 hours if needed for nausea or vomiting. 20 tablet 0     No current facility-administered medications on file prior to visit.        Medication and allergy reconciliation completed     Drug Interactions       Assessment/Plan   Sasha worse the Lakhwinder 3 sensor for one month.   She found that she was surprised by what did/didn't elevated her blood sugar.   Overnight oats spiked her blood sugar \"more than anything\"   1 week vacation during the 4 weeks, tracked the other 3 weeks and kept a diary with notes.   Would like to go through together - I am unable to access [employer] today  Had one high alert and one low alert during the 4 weeks.   Patient stopped metformin after a few weeks of taking it due to side effects of severe abdominal pain, cramps, food aversion, nausea, and diarrhea.   Still notices some food aversions, no weight change.   Sasha would like to consider a GLP-1 to support weight loss.   She feels she's in a better place now to tolerate the potential side effects we had discussed.   Current weight: 289 lbs  BMI: 54.6 kg/m2  Has attempted weight loss since 2016  Fasting insulin significantly elevated: 18 uIU/mL (optimal 3-7 uIU/mL)  TAURUS-IR: 4.4 indicates insulin resistance (<2 goal)  Concurrent medical conditions: depression, fibromyalgia, chronic pain, PCOS  Diet/lifestyle changes  Job is not stressful  Patient is very physically uncomfortable due to fibromyalgia, sitting at a desk is hard for her  Patient started rTMS treatment for depression  Has very specific food cravings, previous struggle with binge eating but now feels she is receiving strong signals from her body of when she is full.   Exercise has been difficult " "due to fatigue  Had been focusing on sleep, variable, will have a \"good streak then a really bad day\"   Headaches \"have been a real struggle\" - working with neurology. Plans to start getting botox injections.   Started seeing a functional medicine practitioner last week.   Ordered labs  Working on gut and plans to start elimination diet next week  Meeting with her again in September       START  Zepbound 2.5mg once weekly  **Update [facility] pharmacy is out of network, will clarify where to send at tomorrows appointment    Follow-up: 8/15/24 3:30pm     Time spent with pt: Total length of time 35 (minutes) of the encounter and more than 50% was spent counseling the patient.      Briseida Polanco, Pharm.D, [facility], [facility]  Clinical Pharmacist  Pharmacy Services  [phone]    Continue all meds under the continuation of care with the referring provider and clinical pharmacy team.    Verbal consent to manage patient's drug therapy was obtained from the patient and/or an individual authorized to act on behalf of a patient. They were informed they may decline to participate or withdraw from participation in pharmacy services at any time.  "

## 2024-08-14 ENCOUNTER — APPOINTMENT (OUTPATIENT)
Dept: PHARMACY | Facility: HOSPITAL | Age: 34
End: 2024-08-14
Payer: COMMERCIAL

## 2024-08-14 DIAGNOSIS — E88.819 INSULIN RESISTANCE: ICD-10-CM

## 2024-08-14 NOTE — PROGRESS NOTES
"  Patient ID: Sasha Longoria is a 33 y.o. female who presents for No chief complaint on file..    Referring Provider: STEPHANIE Noriega    Pt was referred for weight managment, insulin resistance, interested in Continuous glucose monitor   Last visit with referring provider: 3/1/24    Subjective     Preferred Pharmacy:    [id number] - [place], [place] - [address]  [address] [facility] [address]  Phone: [phone] Fax: [phone]    [facility] - [place], [place] - [address]  [address] [facility] [address]  Phone: [phone] Fax: [phone]    [facility] - [place] [address]  [address]  [facility] [address]  Phone: [phone] Fax: [phone]    [id number] - [place], [place] - [address]   [address] [place] [address]  Phone: [phone] Fax: [phone]    [facility]  [address], [address]  [facility] [address]  Phone: [phone] Fax: [phone]      Adherence:   Do you have copays on your medications? Yes  Do you have trouble affording your current medications? No, but in \"limbo\" was laid off, still looking for a job. Has previous employers insurance currently through [employer] (has ~2 more months remaining).   How many doses of medication did you miss in the last 7 days? 1 dose, morning most often, so many pills, gets distracted and forgets to come back to take all meds.     Subjective/Objective:      Patient identified goal:   Increase endurance (VO2 Max is \"terrible\")  Energy to do ADL, regaining independence  More active, without needing a week of recovery time  More comfortable physically  \"A few years ago\" was at 147 lbs and felt good, today desires to be at a size that is healthy and manageable to maintain.     Onset:   When fibromyalgia was triggered, fall of 2020.   Lifetime of chronic stress and trauma. Traumatic event occurred at this time.   Used " "to be active, exercising 6 days/week, noticed she couldn't do what she usually could with weight training.     Past success:   X 2 years exercised 6 d/week with an active rest day along with caloric restriction.     Support network:   Partner  Therapist  Friends    How long implementing diet/lifestyle change for weight loss:   3 months this time, previously x 2 years.     Disordered eating patterns:   Binge eating disorder without purging - not current- when seeking comfort    Concurrent chronic conditions:   Lab Results   Component Value Date    CHHDL 3.0 02/20/2024    TRIG 129 02/20/2024       Prediabetes/Diabetes? Insulin resistance (TAURUS-IR 4.4)    Pertinent PMH Review:   PMH of Pancreatitis: No  PMH of Gallbladder disease: No  PMH of Delayed Gastric Emptying:  Unsure, hx of stomach cramps/pains/gassy/bloating  PMH of MTC: No  PMH of Retinopathy: No, visits eye doctor regularly because of \"potential retinal detachment in one eye\"   PMH of Urinary Tract Infections: No  PMH of Suicidal Ideation: Yes, past.   Female on oral contraceptive? IUD    Migraines? Yes, 12-13 years ago.   Allergies? Hives/Rash with intensive immune response, has had immune panels that came back negative. Sensitive skin, dermatitis.     Anxiety? Yes    Thyroid health:   Lab Results   Component Value Date    TSH 2.03 02/29/2024        Medications potentially contributing to weight gain:   Antipsychotics/Mood stabilizers/Antiepileptics/Nerve pain: gabapentin,      Medications tried/stopped for weight management:     None, past provider tried Wegovy but insurance denied because also on topiramate.     HPI    Objective     LMP  (LMP Unknown)      Labs  Lab Results   Component Value Date    BILITOT 0.3 02/29/2024    CALCIUM 9.3 02/29/2024    CO2 31 02/29/2024     02/29/2024    CREATININE 0.66 02/29/2024    GLUCOSE 100 (H) 02/29/2024    ALKPHOS 98 02/29/2024    K 4.7 02/29/2024    PROT 6.7 04/16/2024     02/29/2024    AST 11 02/29/2024 " "   ALT 15 02/29/2024    BUN 16 02/29/2024    ANIONGAP 10 02/29/2024    ALBUMIN 4.2 02/29/2024     Lab Results   Component Value Date    TRIG 129 02/20/2024    CHOL 130 02/20/2024    LDLCALC 61 02/20/2024    HDL 43.5 02/20/2024     Lab Results   Component Value Date    HGBA1C 5.1 02/29/2024    HGBA1C 5.1 02/20/2024    HGBA1C 5.3 02/08/2022     The ASCVD Risk score (Yesenia JACKSON, et al., 2019) failed to calculate for the following reasons:    The 2019 ASCVD risk score is only valid for ages 40 to 79  No results found for: \"VITD25\"    Current Outpatient Medications on File Prior to Visit   Medication Sig Dispense Refill    acetaminophen (Tylenol 8 HOUR) 650 mg ER tablet Take 1 tablet (650 mg) by mouth every 8 hours if needed for mild pain (1 - 3). Do not crush, chew, or split. Patient takes daily, up to 3000mg/day.      alpha lipoic acid 600 mg capsule Take by mouth. 1 capsule daily after meal 1 week  Then 2 capsules for 1 week  Then 3 capsules for 1 week  Then 4 capsules daily.      buPROPion XL (Wellbutrin XL) 300 mg 24 hr tablet TAKE 1 TABLET BY MOUTH EVERY DAY IN THE MORNING AS DIRECTED      candesartan (Atacand) 16 mg tablet Take 1 tablet (16 mg) by mouth once daily.      cholecalciferol (Vitamin D-3) 25 MCG (1000 UT) capsule Take 2 capsules (50 mcg) by mouth once daily.      clobetasol (Temovate) 0.05 % ointment once daily as needed.      coenzyme Q-10 (Co Q-10) 200 mg capsule Take 1 capsule (200 mg) by mouth 3 times a day.      dihydroergotamine (Trudhesa) 0.725 mg/pump act. (4 mg/mL) nasal spray Administer 1 spray into each nostril every 1 hour if needed for migraine. Dose may be repeated in 1 hour after the first dose. No more than 2 doses within 24 hours or 3 doses within 7 days. 6 each 3    DULoxetine (Cymbalta) 60 mg DR capsule Take 2 capsules (120 mg) by mouth. Do not crush or chew.      ferrous sulfate, 325 mg ferrous sulfate, tablet TAKE 1 TABLET BY MOUTH 4 TIMES A WEEK. DO NOT CRUSH, CHEW, OR SPLIT. 48 " tablet 1    folic acid/vit B complex and C (B COMPLEX-VITAMIN C-FOLIC ACID ORAL) Take 1 tablet by mouth 2 times a day.      gabapentin (Neurontin) 300 mg capsule Take 1 capsule (300 mg) by mouth 3 times a day. 270 capsule 0    ketoconazole (NIZOral) 2 % shampoo Lather onto scalp and let sit for 5 mins before rinsing once daily until improvement.  May decrease to once-twice weekly for maintenance.      leflunomide (Arava) 10 mg tablet       levonorgestrel (Mirena) 21 mcg/24 hours (8 yrs) 52 mg IUD by intrauterine route.      magnesium oxide (Mag-Ox) 400 mg (241.3 mg magnesium) tablet       [] nirmatrelvir-ritonavir (Paxlovid) 300 mg (150 mg x 2)-100 mg tablet therapy pack Take 3 tablets by mouth 2 times a day for 5 days. Follow the instructions on the package 30 tablet 0    omeprazole (PriLOSEC) 40 mg DR capsule Take 1 capsule (40 mg) by mouth once daily. Do not crush or chew. 30 capsule 11    ondansetron (Zofran) 4 mg tablet Take 2 tablets (8 mg) by mouth every 8 hours if needed for nausea or vomiting. 20 tablet 0    Qulipta 60 mg tablet tablet Take 1 tablet (60 mg) by mouth once daily. 30 tablet 3    sennosides (senna) 8.6 mg capsule Take 2 capsules (17.2 mg) by mouth once daily at bedtime. 60 capsule 11    simethicone (Mylicon) 80 mg chewable tablet Chew 1 tablet (80 mg) every 6 hours if needed for flatulence. 30 tablet 11    sulfaSALAzine (Azulfidine) 500 mg DR tablet       [] terconazole (Terazol 7) 0.4 % vaginal cream Insert 1 applicator into the vagina once daily at bedtime for 7 days. 45 g 1    tirzepatide, weight loss, (Zepbound) 2.5 mg/0.5 mL injection Inject 2.5 mg under the skin every 7 days. 2 mL 3    trazodone HCl (TRAZODONE ORAL) Take 100 mg by mouth as needed at bedtime.      triamcinolone (Kenalog) 0.1 % ointment Apply to elbows twice daily only as needed for irritation.  Do not apply more than 21 days per month.      [DISCONTINUED] FreeStyle Lakhwinder 3 Sensor device Apply/Remove every 14  "days as directed. 6 each 3    [DISCONTINUED] nirmatrelvir-ritonavir (Paxlovid) 300 mg (150 mg x 2)-100 mg tablet therapy pack Take 3 tablets by mouth 2 times a day for 5 days. Follow the instructions on the package 30 tablet 0    [DISCONTINUED] nirmatrelvir-ritonavir (Paxlovid) 300 mg (150 mg x 2)-100 mg tablet therapy pack Take 3 tablets by mouth 2 times a day for 5 days. Follow the instructions on the package 30 tablet 0    [DISCONTINUED] ondansetron (Zofran) 4 mg tablet Take 2 tablets (8 mg) by mouth every 8 hours if needed for nausea or vomiting. 20 tablet 0     No current facility-administered medications on file prior to visit.        Medication and allergy reconciliation completed     Drug Interactions       Assessment/Plan   Sasha worse the Lakhwinder 3 sensor for one month.   She found that she was surprised by what did/didn't elevated her blood sugar.   Overnight oats spiked her blood sugar \"more than anything\"   1 week vacation during the 4 weeks, tracked the other 3 weeks and kept a diary with notes.   Today reviewed LibreView data - after 3 weeks of wearing sensor her last week shows the tightest blood glucose control.   Discussed the potential impact of stress on blood sugar increases, specifically July 12th.   Discussed the impact of alcohol on blood sugar  Discussed last A1C was 5.1%, GMI was 5.6% with CMG.        START  Zepbound 2.5mg once weekly  **Update [facility] pharmacy is out of network, patient would prefer rx is [facility].     Follow-up: If zepbound approved Sasha will reach out to me to schedule an educational appointment prior to starting, if not, we will still follow up in 6 weeks.   10/1/24 at 9:30am       Time spent with pt: Total length of time 25 (minutes) of the encounter and more than 50% was spent counseling the patient.      Briseida Polanco, Pharm.D, [facility], [facility]  Clinical Pharmacist  Pharmacy Services  [phone]    Continue all meds under the continuation of care with " the referring provider and clinical pharmacy team.    Verbal consent to manage patient's drug therapy was obtained from the patient and/or an individual authorized to act on behalf of a patient. They were informed they may decline to participate or withdraw from participation in pharmacy services at any time.

## 2024-08-15 ENCOUNTER — TELEMEDICINE (OUTPATIENT)
Dept: PHARMACY | Facility: HOSPITAL | Age: 34
End: 2024-08-15
Payer: COMMERCIAL

## 2024-08-15 ENCOUNTER — INFUSION (OUTPATIENT)
Dept: INFUSION THERAPY | Facility: CLINIC | Age: 34
End: 2024-08-15
Payer: COMMERCIAL

## 2024-08-15 VITALS
RESPIRATION RATE: 19 BRPM | OXYGEN SATURATION: 97 % | TEMPERATURE: 97.2 F | SYSTOLIC BLOOD PRESSURE: 133 MMHG | DIASTOLIC BLOOD PRESSURE: 59 MMHG | HEART RATE: 89 BPM

## 2024-08-15 DIAGNOSIS — M79.7 FIBROMYALGIA: ICD-10-CM

## 2024-08-15 DIAGNOSIS — E88.819 INSULIN RESISTANCE: ICD-10-CM

## 2024-08-15 LAB — PREGNANCY TEST URINE, POC: NEGATIVE

## 2024-08-15 PROCEDURE — 81025 URINE PREGNANCY TEST: CPT

## 2024-08-15 PROCEDURE — 2500000004 HC RX 250 GENERAL PHARMACY W/ HCPCS (ALT 636 FOR OP/ED): Performed by: NURSE PRACTITIONER

## 2024-08-15 PROCEDURE — 96365 THER/PROPH/DIAG IV INF INIT: CPT | Mod: INF

## 2024-08-15 PROCEDURE — 96375 TX/PRO/DX INJ NEW DRUG ADDON: CPT | Mod: INF

## 2024-08-15 PROCEDURE — 2500000005 HC RX 250 GENERAL PHARMACY W/O HCPCS: Performed by: NURSE PRACTITIONER

## 2024-08-15 PROCEDURE — 96368 THER/DIAG CONCURRENT INF: CPT | Mod: INF

## 2024-08-15 RX ORDER — KETOROLAC TROMETHAMINE 30 MG/ML
30 INJECTION, SOLUTION INTRAMUSCULAR; INTRAVENOUS ONCE
Status: COMPLETED | OUTPATIENT
Start: 2024-08-15 | End: 2024-08-15

## 2024-08-15 RX ORDER — METOPROLOL TARTRATE 25 MG/1
25 TABLET, FILM COATED ORAL ONCE
OUTPATIENT
Start: 2024-08-29 | End: 2024-08-29

## 2024-08-15 RX ORDER — KETAMINE HCL IN NACL, ISO-OSM 100MG/10ML
SYRINGE (ML) INJECTION ONCE
OUTPATIENT
Start: 2024-08-29

## 2024-08-15 RX ORDER — FAMOTIDINE 10 MG/ML
20 INJECTION INTRAVENOUS ONCE AS NEEDED
OUTPATIENT
Start: 2024-08-29

## 2024-08-15 RX ORDER — KETOROLAC TROMETHAMINE 30 MG/ML
30 INJECTION, SOLUTION INTRAMUSCULAR; INTRAVENOUS ONCE
OUTPATIENT
Start: 2024-08-29 | End: 2024-08-29

## 2024-08-15 RX ORDER — DIPHENHYDRAMINE HYDROCHLORIDE 50 MG/ML
50 INJECTION INTRAMUSCULAR; INTRAVENOUS AS NEEDED
OUTPATIENT
Start: 2024-08-29

## 2024-08-15 RX ORDER — DIPHENHYDRAMINE HYDROCHLORIDE 50 MG/ML
25 INJECTION INTRAMUSCULAR; INTRAVENOUS ONCE
OUTPATIENT
Start: 2024-08-29 | End: 2024-08-29

## 2024-08-15 RX ORDER — METOCLOPRAMIDE HYDROCHLORIDE 5 MG/ML
10 INJECTION INTRAMUSCULAR; INTRAVENOUS ONCE
OUTPATIENT
Start: 2024-08-29 | End: 2024-08-29

## 2024-08-15 RX ORDER — EPINEPHRINE 0.3 MG/.3ML
0.3 INJECTION SUBCUTANEOUS EVERY 5 MIN PRN
OUTPATIENT
Start: 2024-08-29

## 2024-08-15 RX ORDER — ONDANSETRON HYDROCHLORIDE 2 MG/ML
4 INJECTION, SOLUTION INTRAVENOUS ONCE
OUTPATIENT
Start: 2024-08-29 | End: 2024-08-29

## 2024-08-15 RX ORDER — ALBUTEROL SULFATE 0.83 MG/ML
3 SOLUTION RESPIRATORY (INHALATION) AS NEEDED
OUTPATIENT
Start: 2024-08-29

## 2024-08-15 RX ORDER — KETAMINE HCL IN NACL, ISO-OSM 100MG/10ML
SYRINGE (ML) INJECTION ONCE
Status: COMPLETED | OUTPATIENT
Start: 2024-08-15 | End: 2024-08-15

## 2024-08-15 RX ORDER — NITROGLYCERIN 0.4 MG/1
0.4 TABLET SUBLINGUAL ONCE
OUTPATIENT
Start: 2024-08-29 | End: 2024-08-29

## 2024-08-15 ASSESSMENT — PAIN SCALES - GENERAL
PAINLEVEL_OUTOF10: 2
PAINLEVEL_OUTOF10: 4

## 2024-08-15 ASSESSMENT — COLUMBIA-SUICIDE SEVERITY RATING SCALE - C-SSRS
2. HAVE YOU ACTUALLY HAD ANY THOUGHTS OF KILLING YOURSELF?: NO
1. IN THE PAST MONTH, HAVE YOU WISHED YOU WERE DEAD OR WISHED YOU COULD GO TO SLEEP AND NOT WAKE UP?: NO
6. HAVE YOU EVER DONE ANYTHING, STARTED TO DO ANYTHING, OR PREPARED TO DO ANYTHING TO END YOUR LIFE?: NO
6. HAVE YOU EVER DONE ANYTHING, STARTED TO DO ANYTHING, OR PREPARED TO DO ANYTHING TO END YOUR LIFE?: NO

## 2024-08-15 ASSESSMENT — ENCOUNTER SYMPTOMS
LOSS OF SENSATION IN FEET: 1
OCCASIONAL FEELINGS OF UNSTEADINESS: 0

## 2024-08-15 NOTE — PATIENT INSTRUCTIONS
Today :We administered ketamine 30 mg-lidocaine 300 mg, propofol, and ketorolac.     For:   1. Fibromyalgia            (Tell all doctors including dentists that you are taking this medication)     Go to the emergency room or call 911 if:  -You have signs of allergic reaction:   -Rash, hives, itching.   -Swollen, blistered, peeling skin.   -Swelling of face, lips, mouth, tongue or throat.   -Tightness of chest, trouble breathing, swallowing or talking     Call your doctor:  - If IV / injection site gets red, warm, swollen, itchy or leaks fluid or pus.     (Leave dressing on your IV site for at least 2 hours and keep area clean and dry  - If you get sick or have symptoms of infection or are not feeling well for any reason.    (Wash your hands often, stay away from people who are sick)  - If you have side effects from your medication that do not go away or are bothersome.     (Refer to the teaching your nurse gave you for side effects to call your doctor about)    - Common side effects may include:  stuffy nose, headache, feeling tired, muscle aches, upset stomach  - Before receiving any vaccines     - [facility] at   If:  - You get sick, are on antibiotics, have had a recent vaccine, have surgery or dental work and your doctor wants your visit rescheduled.  - You need to cancel and reschedule your visit for any reason. Call at least 2 days before your visit if you need to cancel.   - Your insurance changes before your next visit.    (We will need to get approval from your new insurance. This can take up to two weeks.)     [facility] is opened Monday thru Friday. We are closed on weekends and holidays.   Voice mail will take your call if the center is closed. If you leave a message please allow 24 hours for a call back during weekdays. If you leave a message on a weekend/holiday, we will call you back the next business day.              [facility] [place]      Pain Infusion  Aftercare Instructions      1. It is normal to feel sedated, tired and low in energy after a pain infusion. DO NOT DRIVE, OPERATE ANY MACHINERY, OR MAKE ANY IMPORTANT DECISIONS FOR AT LEAST 24 HOURS AFTER THE INFUSION.     2. Call the pain center at [phone] with any problems, questions, or concerns.     3. Eat light after the infusion. If you feel queasy or sick to your stomach, laying down with your eyes closed may help. When you resume eating start with something mild like clear liquids, yogurt, applesauce, crackers, etc… Gradually advance to a regular diet.     4. Do not leave your house alone the evening of your pain infusion.     5. No alcohol or sedative medications, such as sleeping pills, for 24 hours after your pain infusion.     6. Resume all other prescribed medications unless directed otherwise by you physician.     7. If you have any medical emergencies, call 911 or go directly to the closest emergency room.

## 2024-08-20 ENCOUNTER — APPOINTMENT (OUTPATIENT)
Dept: INTEGRATIVE MEDICINE | Facility: CLINIC | Age: 34
End: 2024-08-20
Payer: COMMERCIAL

## 2024-08-21 ASSESSMENT — DERMATOLOGY QUALITY OF LIFE (QOL) ASSESSMENT
DATE THE QUALITY-OF-LIFE ASSESSMENT WAS COMPLETED: 67073
WHAT SINGLE SKIN CONDITION LISTED BELOW IS THE PATIENT ANSWERING THE QUALITY-OF-LIFE ASSESSMENT QUESTIONS ABOUT: NONE OF THE ABOVE
RATE HOW BOTHERED YOU ARE BY EFFECTS OF YOUR SKIN PROBLEMS ON YOUR ACTIVITIES (EG, GOING OUT, ACCOMPLISHING WHAT YOU WANT, WORK ACTIVITIES OR YOUR RELATIONSHIPS WITH OTHERS): 1
RATE HOW BOTHERED YOU ARE BY SYMPTOMS OF YOUR SKIN PROBLEM (EG, ITCHING, STINGING BURNING, HURTING OR SKIN IRRITATION): 0 - NEVER BOTHERED
WHAT SINGLE SKIN CONDITION LISTED BELOW IS THE PATIENT ANSWERING THE QUALITY-OF-LIFE ASSESSMENT QUESTIONS ABOUT: NONE OF THE ABOVE
DATE THE QUALITY-OF-LIFE ASSESSMENT WAS COMPLETED: 67073
DATE THE QUALITY-OF-LIFE ASSESSMENT WAS COMPLETED: 67073
RATE HOW BOTHERED YOU ARE BY SYMPTOMS OF YOUR SKIN PROBLEM (EG, ITCHING, STINGING BURNING, HURTING OR SKIN IRRITATION): 0 - NEVER BOTHERED
RATE HOW EMOTIONALLY BOTHERED YOU ARE BY YOUR SKIN PROBLEM (FOR EXAMPLE, WORRY, EMBARRASSMENT, FRUSTRATION): 5
RATE HOW EMOTIONALLY BOTHERED YOU ARE BY YOUR SKIN PROBLEM (FOR EXAMPLE, WORRY, EMBARRASSMENT, FRUSTRATION): 5
DATE THE QUALITY-OF-LIFE ASSESSMENT WAS COMPLETED: 67073
RATE HOW BOTHERED YOU ARE BY SYMPTOMS OF YOUR SKIN PROBLEM (EG, ITCHING, STINGING BURNING, HURTING OR SKIN IRRITATION): 0 - NEVER BOTHERED
RATE HOW BOTHERED YOU ARE BY EFFECTS OF YOUR SKIN PROBLEMS ON YOUR ACTIVITIES (EG, GOING OUT, ACCOMPLISHING WHAT YOU WANT, WORK ACTIVITIES OR YOUR RELATIONSHIPS WITH OTHERS): 1
WHAT SINGLE SKIN CONDITION LISTED BELOW IS THE PATIENT ANSWERING THE QUALITY-OF-LIFE ASSESSMENT QUESTIONS ABOUT: NONE OF THE ABOVE
RATE HOW BOTHERED YOU ARE BY SYMPTOMS OF YOUR SKIN PROBLEM (EG, ITCHING, STINGING BURNING, HURTING OR SKIN IRRITATION): 0 - NEVER BOTHERED
RATE HOW BOTHERED YOU ARE BY EFFECTS OF YOUR SKIN PROBLEMS ON YOUR ACTIVITIES (EG, GOING OUT, ACCOMPLISHING WHAT YOU WANT, WORK ACTIVITIES OR YOUR RELATIONSHIPS WITH OTHERS): 1
RATE HOW EMOTIONALLY BOTHERED YOU ARE BY YOUR SKIN PROBLEM (FOR EXAMPLE, WORRY, EMBARRASSMENT, FRUSTRATION): 5
RATE HOW BOTHERED YOU ARE BY EFFECTS OF YOUR SKIN PROBLEMS ON YOUR ACTIVITIES (EG, GOING OUT, ACCOMPLISHING WHAT YOU WANT, WORK ACTIVITIES OR YOUR RELATIONSHIPS WITH OTHERS): 1
WHAT SINGLE SKIN CONDITION LISTED BELOW IS THE PATIENT ANSWERING THE QUALITY-OF-LIFE ASSESSMENT QUESTIONS ABOUT: NONE OF THE ABOVE
RATE HOW EMOTIONALLY BOTHERED YOU ARE BY YOUR SKIN PROBLEM (FOR EXAMPLE, WORRY, EMBARRASSMENT, FRUSTRATION): 5

## 2024-08-26 ASSESSMENT — DERMATOLOGY QUALITY OF LIFE (QOL) ASSESSMENT
RATE HOW EMOTIONALLY BOTHERED YOU ARE BY YOUR SKIN PROBLEM (FOR EXAMPLE, WORRY, EMBARRASSMENT, FRUSTRATION): 5
RATE HOW BOTHERED YOU ARE BY SYMPTOMS OF YOUR SKIN PROBLEM (EG, ITCHING, STINGING BURNING, HURTING OR SKIN IRRITATION): 0 - NEVER BOTHERED
WHAT SINGLE SKIN CONDITION LISTED BELOW IS THE PATIENT ANSWERING THE QUALITY-OF-LIFE ASSESSMENT QUESTIONS ABOUT: NONE OF THE ABOVE
RATE HOW BOTHERED YOU ARE BY EFFECTS OF YOUR SKIN PROBLEMS ON YOUR ACTIVITIES (EG, GOING OUT, ACCOMPLISHING WHAT YOU WANT, WORK ACTIVITIES OR YOUR RELATIONSHIPS WITH OTHERS): 1

## 2024-08-26 ASSESSMENT — PATIENT GLOBAL ASSESSMENT (PGA): WHAT IS THE PGA: PATIENT GLOBAL ASSESSMENT:  2 - MILD

## 2024-08-27 ENCOUNTER — APPOINTMENT (OUTPATIENT)
Dept: NEUROLOGY | Facility: CLINIC | Age: 34
End: 2024-08-27
Payer: COMMERCIAL

## 2024-08-27 VITALS — DIASTOLIC BLOOD PRESSURE: 64 MMHG | SYSTOLIC BLOOD PRESSURE: 118 MMHG | HEART RATE: 76 BPM | TEMPERATURE: 98.6 F

## 2024-08-27 DIAGNOSIS — G43.109 MIGRAINE WITH AURA AND WITHOUT STATUS MIGRAINOSUS, NOT INTRACTABLE: Primary | ICD-10-CM

## 2024-08-27 PROCEDURE — 64615 CHEMODENERV MUSC MIGRAINE: CPT | Performed by: NURSE PRACTITIONER

## 2024-08-27 PROCEDURE — BOTOX BOTOX 1 UNIT: Performed by: NURSE PRACTITIONER

## 2024-08-27 ASSESSMENT — PAIN SCALES - GENERAL: PAINLEVEL: 4

## 2024-08-27 NOTE — PROGRESS NOTES
Patient ID: Sasha Longoria is a 33 y.o. female.    Head/Face/Jaw Botulinum Injection    Date/Time: 8/27/2024 2:18 PM    Performed by: STEPHANIE Matt  Authorized by: STEPHANIE Matt      Consent:      Consent obtained:  Verbal     Consent given by:  Patient    Procedure details:      EMG used?  No     Electrical stimulation used?  No     Diluted by:  Preservative free saline     Toxin (Brand):  OnaBoNT-A (Botox)     Comments about vials and dilution:  Resident sample     Total units available:  200       Right frontalis:  10 units divided amongst 2 site(s)     Left frontalis:  10 units divided amongst 2 site(s)     Right :  5 units divided amongst 1 site(s)     Left :  5 units divided amongst 1 site(s)     Procerus (midline):  5 units divided amongst 1 site(s)     Right occipitalis:  15 units divided amongst 3 site(s)     Left occipitalis:  15 units divided amongst 3 site(s)     Right cervical paraspinal:  10 units divided amongst 2 site(s)     Left cervical paraspinal:  10 units divided amongst 2 site(s)     Right trapezius:  15 units divided amongst 3 site(s)     Left trapezius:  15 units divided amongst 3 site(s)     Right temporalis:  20 units divided amongst 4 site(s)     Left temporalis:  20 units divided amongst 4 site(s)         Total units injected:  155     Total units wasted:  45    Post-procedure details:      Patient tolerance of procedure:  Tolerated with difficulty    Comments: Please do not rub areas for 24 hours.  No pressure above eyebrows for 24 hours.  Watch out for helmets, headlamps, headbands, goggles, or massage for 24 hours.  If there is discomfort, ice for the first 24 hour,s heat after that.  Headaches may worsen, or you may experience neck stiffness.  If this occurs use your usual headache medication or a mild anti inflammatory such as advil or aleve.  Please call if you have difficulty swallowing.

## 2024-08-28 ENCOUNTER — TELEPHONE (OUTPATIENT)
Dept: PHARMACY | Facility: HOSPITAL | Age: 34
End: 2024-08-28
Payer: COMMERCIAL

## 2024-08-28 DIAGNOSIS — E88.819 INSULIN RESISTANCE: ICD-10-CM

## 2024-08-28 NOTE — TELEPHONE ENCOUNTER
"Patient emailed \"Hello, I see [facility] approved the prior authorization. I'm assuming it needs to be sent to my local pharmacy because they won't fill 30 day supplies or less. If this is the case in terms of how much they approved, please [facility] at [address] in [place].\"    Sending rx to [facility] pharmacy for Zepbound.     Briseida Polanco, Pharm.D.     "

## 2024-08-29 ENCOUNTER — INFUSION (OUTPATIENT)
Dept: INFUSION THERAPY | Facility: CLINIC | Age: 34
End: 2024-08-29
Payer: COMMERCIAL

## 2024-08-29 ENCOUNTER — APPOINTMENT (OUTPATIENT)
Dept: INTEGRATIVE MEDICINE | Facility: CLINIC | Age: 34
End: 2024-08-29
Payer: COMMERCIAL

## 2024-08-29 VITALS
RESPIRATION RATE: 14 BRPM | OXYGEN SATURATION: 97 % | TEMPERATURE: 97.7 F | SYSTOLIC BLOOD PRESSURE: 126 MMHG | HEART RATE: 80 BPM | DIASTOLIC BLOOD PRESSURE: 76 MMHG

## 2024-08-29 DIAGNOSIS — M79.7 FIBROMYALGIA: ICD-10-CM

## 2024-08-29 LAB — PREGNANCY TEST URINE, POC: NEGATIVE

## 2024-08-29 PROCEDURE — 96365 THER/PROPH/DIAG IV INF INIT: CPT | Mod: INF

## 2024-08-29 PROCEDURE — 96375 TX/PRO/DX INJ NEW DRUG ADDON: CPT | Mod: INF

## 2024-08-29 PROCEDURE — 2500000004 HC RX 250 GENERAL PHARMACY W/ HCPCS (ALT 636 FOR OP/ED): Performed by: NURSE PRACTITIONER

## 2024-08-29 PROCEDURE — 81025 URINE PREGNANCY TEST: CPT

## 2024-08-29 PROCEDURE — 96368 THER/DIAG CONCURRENT INF: CPT | Mod: INF

## 2024-08-29 PROCEDURE — 2500000005 HC RX 250 GENERAL PHARMACY W/O HCPCS: Performed by: NURSE PRACTITIONER

## 2024-08-29 RX ORDER — KETAMINE HCL IN NACL, ISO-OSM 100MG/10ML
SYRINGE (ML) INJECTION ONCE
Status: CANCELLED | OUTPATIENT
Start: 2024-09-12

## 2024-08-29 RX ORDER — ONDANSETRON HYDROCHLORIDE 2 MG/ML
4 INJECTION, SOLUTION INTRAVENOUS ONCE
OUTPATIENT
Start: 2024-09-12 | End: 2024-09-12

## 2024-08-29 RX ORDER — EPINEPHRINE 0.3 MG/.3ML
0.3 INJECTION SUBCUTANEOUS EVERY 5 MIN PRN
OUTPATIENT
Start: 2024-09-12

## 2024-08-29 RX ORDER — KETOROLAC TROMETHAMINE 30 MG/ML
30 INJECTION, SOLUTION INTRAMUSCULAR; INTRAVENOUS ONCE
Status: COMPLETED | OUTPATIENT
Start: 2024-08-29 | End: 2024-08-29

## 2024-08-29 RX ORDER — DIPHENHYDRAMINE HYDROCHLORIDE 50 MG/ML
50 INJECTION INTRAMUSCULAR; INTRAVENOUS AS NEEDED
OUTPATIENT
Start: 2024-09-12

## 2024-08-29 RX ORDER — FAMOTIDINE 10 MG/ML
20 INJECTION INTRAVENOUS ONCE AS NEEDED
OUTPATIENT
Start: 2024-09-12

## 2024-08-29 RX ORDER — DIPHENHYDRAMINE HYDROCHLORIDE 50 MG/ML
25 INJECTION INTRAMUSCULAR; INTRAVENOUS ONCE
OUTPATIENT
Start: 2024-09-12 | End: 2024-09-12

## 2024-08-29 RX ORDER — METOPROLOL TARTRATE 25 MG/1
25 TABLET, FILM COATED ORAL ONCE
OUTPATIENT
Start: 2024-09-12 | End: 2024-09-12

## 2024-08-29 RX ORDER — KETAMINE HCL IN NACL, ISO-OSM 100MG/10ML
SYRINGE (ML) INJECTION ONCE
Status: COMPLETED | OUTPATIENT
Start: 2024-08-29 | End: 2024-08-29

## 2024-08-29 RX ORDER — NITROGLYCERIN 0.4 MG/1
0.4 TABLET SUBLINGUAL ONCE
OUTPATIENT
Start: 2024-09-12 | End: 2024-09-12

## 2024-08-29 RX ORDER — ALBUTEROL SULFATE 0.83 MG/ML
3 SOLUTION RESPIRATORY (INHALATION) AS NEEDED
OUTPATIENT
Start: 2024-09-12

## 2024-08-29 RX ORDER — KETOROLAC TROMETHAMINE 30 MG/ML
30 INJECTION, SOLUTION INTRAMUSCULAR; INTRAVENOUS ONCE
Status: CANCELLED | OUTPATIENT
Start: 2024-09-12 | End: 2024-09-12

## 2024-08-29 RX ORDER — METOCLOPRAMIDE HYDROCHLORIDE 5 MG/ML
10 INJECTION INTRAMUSCULAR; INTRAVENOUS ONCE
OUTPATIENT
Start: 2024-09-12 | End: 2024-09-12

## 2024-08-29 ASSESSMENT — PAIN SCALES - GENERAL
PAINLEVEL_OUTOF10: 2
PAINLEVEL_OUTOF10: 5 - MODERATE PAIN

## 2024-08-29 ASSESSMENT — ENCOUNTER SYMPTOMS
LOSS OF SENSATION IN FEET: 1
DEPRESSION: 0
OCCASIONAL FEELINGS OF UNSTEADINESS: 0

## 2024-08-29 NOTE — PATIENT INSTRUCTIONS
Today :We administered ketamine 30 mg-lidocaine 300 mg, propofol, and ketorolac.     For:   1. Fibromyalgia            (Tell all doctors including dentists that you are taking this medication)     Go to the emergency room or call 911 if:  -You have signs of allergic reaction:   -Rash, hives, itching.   -Swollen, blistered, peeling skin.   -Swelling of face, lips, mouth, tongue or throat.   -Tightness of chest, trouble breathing, swallowing or talking     Call your doctor:  - If IV / injection site gets red, warm, swollen, itchy or leaks fluid or pus.     (Leave dressing on your IV site for at least 2 hours and keep area clean and dry  - If you get sick or have symptoms of infection or are not feeling well for any reason.    (Wash your hands often, stay away from people who are sick)  - If you have side effects from your medication that do not go away or are bothersome.     (Refer to the teaching your nurse gave you for side effects to call your doctor about)    - Common side effects may include:  stuffy nose, headache, feeling tired, muscle aches, upset stomach  - Before receiving any vaccines     - Call [facility] at   If:  - You get sick, are on antibiotics, have had a recent vaccine, have surgery or dental work and your doctor wants your visit rescheduled.  - You need to cancel and reschedule your visit for any reason. Call at least 2 days before your visit if you need to cancel.   - Your insurance changes before your next visit.    (We will need to get approval from your new insurance. This can take up to two weeks.)     [facility] is opened Monday thru Friday. We are closed on weekends and holidays.   Voice mail will take your call if the center is closed. If you leave a message please allow 24 hours for a call back during weekdays. If you leave a message on a weekend/holiday, we will call you back the next business day.              [facility] [place]      Pain Infusion  Aftercare Instructions      1. It is normal to feel sedated, tired and low in energy after a pain infusion. DO NOT DRIVE, OPERATE ANY MACHINERY, OR MAKE ANY IMPORTANT DECISIONS FOR AT LEAST 24 HOURS AFTER THE INFUSION.     2. Call the pain center at [phone] with any problems, questions, or concerns.     3. Eat light after the infusion. If you feel queasy or sick to your stomach, laying down with your eyes closed may help. When you resume eating start with something mild like clear liquids, yogurt, applesauce, crackers, etc… Gradually advance to a regular diet.     4. Do not leave your house alone the evening of your pain infusion.     5. No alcohol or sedative medications, such as sleeping pills, for 24 hours after your pain infusion.     6. Resume all other prescribed medications unless directed otherwise by you physician.     7. If you have any medical emergencies, call 911 or go directly to the closest emergency room.

## 2024-08-29 NOTE — PROGRESS NOTES
S: Patient here for 20th opioid sparing pain infusion. Patient reports 40% reduction in pain after last infusion that lasted 1.5 weeks.    Purpose of pain infusion meds explained along with potential side effects.  Patient verbalized understanding.    B: Pain Issues all over, especially knees, head, left arm and hand    A: Patient currently has pain described on flow sheet documentation. Designated  is [facility] Concierge at [phone]. Patient last ate solid food 10 hours ago, and had liquid 1 hours ago.    R: Plan; Obtain IV access, do patient risk assessment, and start opioid sparing infusion as ordered. Monitoring for S/S of adverse reactions.    0856- Post infusion teaching provided. Patient verbalized understanding. VSS, Patient states pain is 2. Will assist patient to waiting car via wheelchair.

## 2024-08-30 ENCOUNTER — TELEPHONE (OUTPATIENT)
Dept: PHARMACY | Facility: HOSPITAL | Age: 34
End: 2024-08-30
Payer: COMMERCIAL

## 2024-08-30 NOTE — PROGRESS NOTES
Assessment/Plan   Today's presentation is consistent with fibromyalgia/myofascial pain syndrome alongside postural strain and somatic dysfunction contributing to her pain syndrome, also has been diagnosed with inflammatory arthritis (not rheumatoid), and has had elevated CRP and been under rheumatologic care.  Complicating factors include chronicity of symptoms as well as body habitus.  We will implement a trial of care to include various manipulative techniques which will range from instrument assisted to diversified.  We will utilize soft tissue techniques such as active release technique to the cervical spine musculature.  We will closely monitor their response to care to determine if any changes need to occur, if advanced imaging is needed, or if referral to other providers is appropriate. She will also be receiving acupuncture and integrative medicine consultation which is appropriate when considering her overall presentation     LS radiographs 2024 - IMPRESSION:  No acute pathologic findings are identified.    CS radiographs 2024 - IMPRESSION:  No pathologic findings are identified.    TS radiographs 2024 - IMPRESSION:  No acute pathologic findings are identified.  Thoracic lumbar dextroscoliosis.  Lower thoracic mild spondylosis.    Visits this year: 8 (under new insurance)    Subjective   Patient reports that she has made some progress with chiropractic care acupuncture and recently started Botox injections for migraines and neck pain.  However she feels ongoing pain that is frequent rated 7 out of 10 in the neck and upper back general body aches and typically feels like this most of the day without any particular worsening in the morning or evening.  She is under care of rheumatology is also seeing pain management.  Tries to stay active with doing some exercise on her own and stretches regularly.  Currently endorses frequent severe pain and tightness in the neck but she is not having any migraine-like  symptoms at the moment    HPI - 10/05/23 (CR): the patient presents today per the referral of Dr. Suárez and pain management for evaluation and and management of chronic full spine complaints. She has in the past been diagnosed with fibromyalgia. She has dealt with pain for several years and has received chiropractic care in the past. She did have mixed results with chiropractic care in the past, reporting a variation of instrument assisted manipulation and manual manipulation. But she wished to attend chiropractic care again through a hospital-based provider. She is under the care of pain management and will be starting infusions next week. She is also on the wait list for infusions at [facility] if needed. Overall, her pain levels remain moderate to severe. Her pain is constant. Symptoms in the lower back and hips seem to be the area of most intensity. But she does continue to experience pain throughout the spine. She experiences paresthesias in the upper and lower extremities bilaterally. She has difficulty finding any comfortable positions     Review of Systems   Constitutional:  Positive for fatigue. Negative for fever.   Eyes:  Negative for visual disturbance.   Respiratory:  Negative for shortness of breath.    Cardiovascular:  Negative for chest pain.   Gastrointestinal:  Negative for diarrhea, nausea and vomiting.   Genitourinary:  Negative for difficulty urinating and dysuria.   Skin:  Negative for color change.   Neurological:  Negative for dizziness.   Psychiatric/Behavioral:  Negative for agitation.    All other systems reviewed and are negative.    Objective   Examination findings (e.g., palpation & ROM): Decreased C/S and L/S ROM with pain, hypertonic and tender cervical and lumbar erectors     Segmental joint dysfunction was identified in the following areas using motion palpation and/or pain provocation assessment:  Cervical: 2  Thoracic: 5-8  Lumbopelvic: 1-3, BL SIJ      Physical  Exam  Neurological:      General: No focal deficit present.      Mental Status: She is alert.      Motor: Motor function is intact.      Coordination: Coordination is intact.      Gait: Gait is intact.      Deep Tendon Reflexes: Reflexes are normal and symmetric.      Reflex Scores:       Tricep reflexes are 2+ on the right side and 2+ on the left side.       Bicep reflexes are 2+ on the right side and 2+ on the left side.       Brachioradialis reflexes are 2+ on the right side and 2+ on the left side.       Patellar reflexes are 2+ on the right side and 2+ on the left side.       Achilles reflexes are 2+ on the right side and 2+ on the left side.     Comments: Cam Mosqueda's BL  9/3/24       Plan   Today's treatment:  SMT to regions of segmental dysfunction identified on exam, using age-appropriate force, and manual diversified technique.   STM to patient tolerance to hypertonic paraspinal muscles  Manual dynamic stretching of the gluteal muscles, hamstrings, upper trapezius  10:02 to 10:17 AM  Patient noted improved mobility and reduced pain post-treatment    Treatment Plan:   The patient and I discussed the risks and benefits of chiropractic care. Based on the patient's subjective complaints along with the examination findings, it is advised that a course of chiropractic treatment be initiated. The patient provided consent for care. The patient tolerated today's treatment with little or no additional discomfort and was instructed to contact the office for questions or concerns. Will see patient once per week then every 2 weeks when symptoms become mild/manageable, further spaced apart contingent upon improvement.     This chart note was generated using dictation software, and as such, there may be typographical errors present. Abbreviations: Cervical spine (CS), cervical-thoracic (CT), Dry needling (DN), Flexion adduction internal rotation (FAIR), high velocity, low amplitude (HVLA), Lumbar spine (LS), Soft  tissue manipulation (STM), spinal manipulative therapy (SMT), Straight leg raise (SLR), Thoracic spine (TS).

## 2024-08-30 NOTE — TELEPHONE ENCOUNTER
Provided patient education for Zepbound 2.5mg.     GLP-1 Education    GLP-1 medications work by stimulating insulin release from the pancreas, slowing gastric emptying, inhibiting post meal glucagon release, and reducing appetite.     Discussed benefits of GLP-1 including lowering blood sugar, blood pressure, improving lipid profile, improving fatty liver disease, reducing the risk of heart disease and kidney disease, weight loss, and delaying the progression of diabetes-related nephropathy.     40-50% of weight loss may come from lean muscle mass. It is very important to continue regular exercise including activities that maintain/build muscle mass while taking this medication.     Side effects could include but are not limited to low blood sugar (hypoglycemia), loss of appetite, nausea, vomiting, diarrhea, and delayed gastric emptying.     Encouraged smaller meals and avoiding high fat meals to minimize nausea.   Discussed treating hypoglycemia with 15 grams of carbohydrates (1/2 glass of juice, piece of fruit, 3-4 glucose tablets)    Rare but serious side effects could include but are not limited to gallbladder disease, pancreatitis, medullary thyroid cancer, acute kidney injury, worsening diabetes related retinopathy, gastroparesis, and bowel obstruction.     If you would experience increased depression or suicidal thoughts while taking this medication contact your PCP immediately.     GLP-1 medications may decrease the efficacy of oral contraceptives.   GLP-1 medications are not safe to take during pregnancy. If you would become pregnant while on a GLP-1 discontinue immediately.      Zepbound Education:     Counseled patient on Zepbound MOA, expectations, side effects, duration of therapy, administration, and monitoring parameters.  Provided detailed dosing and administration counseling to ensure proper technique.   Reviewed Zepbound titration schedule, starting with 2.5 mg once weekly to a goal of 15 mg once  weekly if tolerated  Counseled patient on the benefits of GLP-1ra glycemic control and weight loss  Reviewed storage requirements of Zepbound when not in use, and when to administer the medication if a dose is missed.  Advised patient that they may experience improved satiety after meals and portion sizes of meals may be reduced as doses of Zepound increase.    Plans to give first dose on Sunday evening because she is traveling this weekend.       Briseida Polanco, PharmBrandyD.

## 2024-09-03 ENCOUNTER — APPOINTMENT (OUTPATIENT)
Dept: INTEGRATIVE MEDICINE | Facility: CLINIC | Age: 34
End: 2024-09-03
Payer: COMMERCIAL

## 2024-09-03 DIAGNOSIS — G89.29 CHRONIC NECK PAIN: ICD-10-CM

## 2024-09-03 DIAGNOSIS — M54.2 CHRONIC NECK PAIN: ICD-10-CM

## 2024-09-03 DIAGNOSIS — M99.01 SOMATIC DYSFUNCTION OF CERVICAL REGION: Primary | ICD-10-CM

## 2024-09-03 DIAGNOSIS — M54.40 CHRONIC LOW BACK PAIN WITH SCIATICA, SCIATICA LATERALITY UNSPECIFIED, UNSPECIFIED BACK PAIN LATERALITY: ICD-10-CM

## 2024-09-03 DIAGNOSIS — G89.29 CHRONIC LOW BACK PAIN WITH SCIATICA, SCIATICA LATERALITY UNSPECIFIED, UNSPECIFIED BACK PAIN LATERALITY: ICD-10-CM

## 2024-09-03 DIAGNOSIS — M99.02 SOMATIC DYSFUNCTION OF THORACIC REGION: ICD-10-CM

## 2024-09-03 DIAGNOSIS — M79.10 MYALGIA: ICD-10-CM

## 2024-09-03 DIAGNOSIS — M99.04 SACROILIAC JOINT SOMATIC DYSFUNCTION: ICD-10-CM

## 2024-09-03 DIAGNOSIS — M99.03 SOMATIC DYSFUNCTION OF LUMBAR REGION: ICD-10-CM

## 2024-09-03 PROCEDURE — 97112 NEUROMUSCULAR REEDUCATION: CPT | Performed by: CHIROPRACTOR

## 2024-09-03 PROCEDURE — 98941 CHIROPRACT MANJ 3-4 REGIONS: CPT | Performed by: CHIROPRACTOR

## 2024-09-03 ASSESSMENT — ENCOUNTER SYMPTOMS
DIZZINESS: 0
AGITATION: 0
DIFFICULTY URINATING: 0
FATIGUE: 1
VOMITING: 0
DIARRHEA: 0
COLOR CHANGE: 0
NAUSEA: 0
DYSURIA: 0
SHORTNESS OF BREATH: 0
FEVER: 0

## 2024-09-09 ENCOUNTER — PATIENT MESSAGE (OUTPATIENT)
Dept: GASTROENTEROLOGY | Facility: HOSPITAL | Age: 34
End: 2024-09-09
Payer: COMMERCIAL

## 2024-09-09 DIAGNOSIS — R12 HEARTBURN: ICD-10-CM

## 2024-09-09 DIAGNOSIS — K52.9 CHRONIC DIARRHEA: ICD-10-CM

## 2024-09-09 DIAGNOSIS — G43.109 MIGRAINE WITH AURA AND WITHOUT STATUS MIGRAINOSUS, NOT INTRACTABLE: ICD-10-CM

## 2024-09-09 DIAGNOSIS — K58.9 IRRITABLE BOWEL SYNDROME, UNSPECIFIED TYPE: ICD-10-CM

## 2024-09-09 DIAGNOSIS — R11.2 NAUSEA AND VOMITING, UNSPECIFIED VOMITING TYPE: ICD-10-CM

## 2024-09-09 RX ORDER — ATOGEPANT 60 MG/1
60 TABLET ORAL DAILY
Qty: 30 TABLET | Refills: 3 | Status: SHIPPED | OUTPATIENT
Start: 2024-09-09

## 2024-09-10 DIAGNOSIS — M79.7 FIBROMYALGIA: Primary | ICD-10-CM

## 2024-09-10 RX ORDER — KETAMINE HCL IN NACL, ISO-OSM 100MG/10ML
SYRINGE (ML) INJECTION ONCE
OUTPATIENT
Start: 2024-09-16

## 2024-09-10 RX ORDER — KETOROLAC TROMETHAMINE 30 MG/ML
30 INJECTION, SOLUTION INTRAMUSCULAR; INTRAVENOUS ONCE
OUTPATIENT
Start: 2024-09-16 | End: 2024-09-16

## 2024-09-10 RX ORDER — SENNOSIDES 8.6 MG/1
17.2 CAPSULE, GELATIN COATED ORAL NIGHTLY
Qty: 60 CAPSULE | Refills: 11 | Status: SHIPPED | OUTPATIENT
Start: 2024-09-10 | End: 2025-09-05

## 2024-09-10 RX ORDER — OMEPRAZOLE 40 MG/1
40 CAPSULE, DELAYED RELEASE ORAL DAILY
Qty: 30 CAPSULE | Refills: 11 | Status: SHIPPED | OUTPATIENT
Start: 2024-09-10 | End: 2025-09-10

## 2024-09-11 ENCOUNTER — APPOINTMENT (OUTPATIENT)
Dept: ALLERGY | Facility: CLINIC | Age: 34
End: 2024-09-11
Payer: COMMERCIAL

## 2024-09-11 ENCOUNTER — TELEMEDICINE (OUTPATIENT)
Dept: PAIN MEDICINE | Facility: CLINIC | Age: 34
End: 2024-09-11
Payer: COMMERCIAL

## 2024-09-11 ASSESSMENT — ENCOUNTER SYMPTOMS
AGITATION: 0
CONSTIPATION: 0
COLOR CHANGE: 0
SHORTNESS OF BREATH: 0
DIFFICULTY URINATING: 0
SHORTNESS OF BREATH: 0
DIARRHEA: 0
CONFUSION: 0
DYSURIA: 0
AGITATION: 0
DEPRESSION: 0
FEVER: 0
OCCASIONAL FEELINGS OF UNSTEADINESS: 0
CHILLS: 0
FATIGUE: 1
WEAKNESS: 0
DIARRHEA: 0
CHEST TIGHTNESS: 0
HEADACHES: 1
PALPITATIONS: 0
NAUSEA: 0
MYALGIAS: 1
VOMITING: 0
NAUSEA: 0
NECK PAIN: 1
WHEEZING: 0
LOSS OF SENSATION IN FEET: 0
DIZZINESS: 0
BACK PAIN: 1

## 2024-09-11 ASSESSMENT — PAIN SCALES - GENERAL: PAINLEVEL_OUTOF10: 4

## 2024-09-11 ASSESSMENT — PAIN DESCRIPTION - DESCRIPTORS: DESCRIPTORS: ACHING;DULL

## 2024-09-11 ASSESSMENT — PAIN - FUNCTIONAL ASSESSMENT: PAIN_FUNCTIONAL_ASSESSMENT: 0-10

## 2024-09-11 NOTE — PROGRESS NOTES
Assessment/Plan   Today's presentation is consistent with fibromyalgia/myofascial pain syndrome alongside postural strain and somatic dysfunction contributing to her pain syndrome, also has been diagnosed with inflammatory arthritis (not rheumatoid), and has had elevated CRP and been under rheumatologic care.  Complicating factors include chronicity of symptoms as well as body habitus.  We will implement a trial of care to include various manipulative techniques which will range from instrument assisted to diversified.  We will utilize soft tissue techniques such as active release technique to the cervical spine musculature.  We will closely monitor their response to care to determine if any changes need to occur, if advanced imaging is needed, or if referral to other providers is appropriate. She will also be receiving acupuncture and integrative medicine consultation which is appropriate when considering her overall presentation     LS radiographs 2024 - IMPRESSION:  No acute pathologic findings are identified.    CS radiographs 2024 - IMPRESSION:  No pathologic findings are identified.    TS radiographs 2024 - IMPRESSION:  No acute pathologic findings are identified.  Thoracic lumbar dextroscoliosis.  Lower thoracic mild spondylosis.    Visits this year: 9 (under new insurance)    Subjective   Patient endorses frequent severe pain in the lower back locally without radiation numbness or tingling also endorsing frequent moderate to severe pain and stiffness in the neck with local symptoms.  She did get relief from her last visit however symptoms returned over the past week with long periods of traveling and sitting on a plane.  She recently establish care with a functional medicine practitioner at an outside clinic and is undergoing additional testing including GI mapping but is waiting for the results to come back    HPI - 10/05/23 (CR): the patient presents today per the referral of Dr. Suárez and pain management  for evaluation and and management of chronic full spine complaints. She has in the past been diagnosed with fibromyalgia. She has dealt with pain for several years and has received chiropractic care in the past. She did have mixed results with chiropractic care in the past, reporting a variation of instrument assisted manipulation and manual manipulation. But she wished to attend chiropractic care again through a hospital-based provider. She is under the care of pain management and will be starting infusions next week. She is also on the wait list for infusions at [facility] if needed. Overall, her pain levels remain moderate to severe. Her pain is constant. Symptoms in the lower back and hips seem to be the area of most intensity. But she does continue to experience pain throughout the spine. She experiences paresthesias in the upper and lower extremities bilaterally. She has difficulty finding any comfortable positions     Review of Systems   Constitutional:  Positive for fatigue. Negative for fever.   Eyes:  Negative for visual disturbance.   Respiratory:  Negative for shortness of breath.    Cardiovascular:  Negative for chest pain.   Gastrointestinal:  Negative for diarrhea, nausea and vomiting.   Genitourinary:  Negative for difficulty urinating and dysuria.   Skin:  Negative for color change.   Neurological:  Negative for dizziness.   Psychiatric/Behavioral:  Negative for agitation.    All other systems reviewed and are negative.    Objective   Examination findings (e.g., palpation & ROM): Decreased C/S and L/S ROM with pain, hypertonic and tender cervical and lumbar erectors     Segmental joint dysfunction was identified in the following areas using motion palpation and/or pain provocation assessment:  Cervical: 2  Thoracic: 5-8  Lumbopelvic: 1-3, BL SIJ      Physical Exam  Neurological:      General: No focal deficit present.      Mental Status: She is alert.      Motor: Motor function is intact.       Coordination: Coordination is intact.      Gait: Gait is intact.      Deep Tendon Reflexes: Reflexes are normal and symmetric.      Reflex Scores:       Tricep reflexes are 2+ on the right side and 2+ on the left side.       Bicep reflexes are 2+ on the right side and 2+ on the left side.       Brachioradialis reflexes are 2+ on the right side and 2+ on the left side.       Patellar reflexes are 2+ on the right side and 2+ on the left side.       Achilles reflexes are 2+ on the right side and 2+ on the left side.     Comments: Trace Jaylin's BL  9/3/24         Plan   Today's treatment:  SMT to regions of segmental dysfunction identified on exam, using age-appropriate force, and manual diversified technique.   STM to patient tolerance to hypertonic paraspinal muscles  Manual dynamic stretching of the gluteal muscles, hamstrings, upper trapezius  11 to 11:15 AM  Patient noted improved mobility and reduced pain post-treatment    Treatment Plan:   The patient and I discussed the risks and benefits of chiropractic care. Based on the patient's subjective complaints along with the examination findings, it is advised that a course of chiropractic treatment be initiated. The patient provided consent for care. The patient tolerated today's treatment with little or no additional discomfort and was instructed to contact the office for questions or concerns. Will see patient once per week then every 2 weeks when symptoms become mild/manageable, further spaced apart contingent upon improvement.     This chart note was generated using dictation software, and as such, there may be typographical errors present. Abbreviations: Cervical spine (CS), cervical-thoracic (CT), Dry needling (DN), Flexion adduction internal rotation (FAIR), high velocity, low amplitude (HVLA), Lumbar spine (LS), Soft tissue manipulation (STM), spinal manipulative therapy (SMT), Straight leg raise (SLR), Thoracic spine (TS).

## 2024-09-11 NOTE — PROGRESS NOTES
"Virtual or Telephone Consent    An interactive audio and video telecommunication system which permits real time communications between the patient (at the originating site) and provider (at the distant site) was utilized to provide this telehealth service.   Verbal consent was requested and obtained from Sasha Longoria on this date, 09/11/24 for a telehealth visit.     Chief Complain  Follow up for wide spread muscle and joint pain and the occasional nerve pain radiating to upper and lower extremities.    History Of Present Illness  Sasha Longoria is a 33 y.o. female here for widespread muscle and joint pain radiating to upper extremities lower extremities. The patient rates the pain at 4 on a scale from 0-10.  The patient describes pain as dull, aching.  The pain is worsened by bending forward, standing, walking, and walking through airports  and is alleviated by position change, sitting, lying down, and IV infusion therapy .  Since the last visit the pain has improved.    The patient denies any fever, chills, weight loss, weakness, bladder/ bowel incontinence, history of cancer, history of IV drug abuse, recent trauma.     Prior office visit:  6/19/2024  Keerthi Lui \"Charlee" is a 33 y.o. female recall past medical history of obesity BMI 61, anxiety and depression, PTSD, migraines, fibromyalgia on gabapentin 300 mg 3 times daily, who is being treated with IV infusion therapy for chronic widespread body aches and pains that radiate to upper and lower extremities.  She receives IV infusion therapy every 2 weeks which provides 50% relief in fibromyalgia pain for roughly 2 weeks.  She reports she is not as active immediately after infusions which is helping provide longer duration of relief.  She receives chiropractic manipulations with dry needling every 3 weeks with significant improvement in symptoms.  She also reports having myofascial massages every couple of weeks which provides significant " relief.  She completed acupuncture and aquatic therapy with moderate improvement in strength and range of motion.  She has been referred to a neurologist to help manage her migraines which are more frequent.  Has tried multiple medications with minimal relief.  She denies any new neurological constitutional symptoms.  She denies any bowel or bladder incontinence or saddle paresthesia given the relief the IV infusion therapy provides plan to continue with IV infusion therapy every 2 weeks with the goal of transitioning every 3 weeks.  Encourage patient to take gabapentin as prescribed, advised patient that she can take 600 mg in the morning and 300 mg in the p.m. or vice versa.  He denies medication side effects provided refill of gabapentin.  Overall very pleased with the relief the IV infusion therapy is providing her widespread symptoms, which provide improved quality life.  Recommend to continue with chiropractic manipulations, dry needling and incorporating home stretching regiment.  Continue with comprehensive pain management program.  Follow-up in 3 months or sooner if needed       Portions of record reviewed for pertinent issues: active problem list, medication list, allergies, family history, social history, notes from last encounter, encounters, lab results, imaging and other available records.      I have personally reviewed the OAR[facility] report for this patient. This report is scanned into the electronic medical record. I have considered the risks of abuse, dependence, addiction and diversion. It showed:  Gabapentin from Dr. Burr and Vyvanse trial from Dr. Carrizales was discontinued.  Ambien and Eszopiclone from Dr. Guerra     Past Medical History  She has a past medical history of Abnormal Pap smear of cervix, Anxiety, Arthritis (~ 2013), Chronic pain disorder, Depression, Eczema (~ 2005), Fibromyalgia, primary, GERD (gastroesophageal reflux disease) (~2018), Headache, Headache, tension-type, HPV (human  papilloma virus) infection, Joint pain, Low back pain, Memory loss, Migraine, Neck pain, Neuromuscular disorder (Multi) (), Numbness, Obstructive sleep apnea, Polycystic ovary syndrome, Scoliosis (~), and Visual impairment (~).    Surgical History  She has a past surgical history that includes Other surgical history (2019); Other surgical history (2022); and Tonsillectomy.     Social History  She reports that she has never smoked. She has never used smokeless tobacco. She reports current alcohol use of about 1.0 standard drink of alcohol per week. She reports that she does not currently use drugs after having used the following drugs: Marijuana. Frequency: 4.00 times per week.    Family History  Family History   Problem Relation Name Age of Onset    Breast cancer Paternal Grandmother      Arthritis Mother Haylee     Depression Mother Haylee     Diabetes Mother Haylee     Heart disease Mother Haylee     Hypertension Mother Haylee     Learning disabilities Mother Haylee     Intellectual Disability Mother Haylee     Miscarriages / Stillbirths Mother Haylee      labor Mother Haylee     Depression Father Lowell     Diabetes Father Lowell     Drug abuse Father Lowell     Hypertension Father Lowell     Mental illness Father Lowell     Alcohol abuse Maternal Grandfather Tera     Depression Maternal Grandfather Tera     Drug abuse Maternal Grandfather Tera     Heart disease Maternal Grandfather Tera     Stroke Maternal Grandfather Tera     Cancer Maternal Grandmother Carri     Depression Maternal Grandmother Carri     Diabetes Maternal Grandmother Carri     Depression Paternal Grandfather Whit     Heart disease Paternal Grandfather Whit     Hypertension Paternal Grandfather Whit     Migraines Paternal Grandfather Whit     Cancer Sister Amina     Depression Sister Amina     Cancer Sister Cayla     Depression Sister Cayla     Drug abuse Sister Cayla     Hypertension Sister Cayla      Miscarriages / Stillbirths Sister Cayla     Depression Sister Kenzie     Depression Sister Maria L     Depression Brother Chris     Drug abuse Mother's Brother Tera     Learning disabilities Brother Crow     Intellectual Disability Brother Crow         Allergies  Patient has no known allergies.    Review of Systems  Review of Systems   Constitutional:  Negative for chills.   Respiratory:  Negative for chest tightness, shortness of breath and wheezing.    Cardiovascular:  Negative for chest pain and palpitations.   Gastrointestinal:  Negative for constipation, diarrhea and nausea.   Musculoskeletal:  Positive for back pain, myalgias and neck pain.        Neck,shoulder and upper back,  Lower back, with multiple muscular skeletal pains   Neurological:  Positive for headaches. Negative for weakness.        Botox injections for migraines   Psychiatric/Behavioral:  Negative for agitation, confusion and suicidal ideas.       Physical Exam  Physical Exam  Constitutional:       General: She is not in acute distress.     Appearance: Normal appearance. She is obese.   Eyes:      Extraocular Movements: Extraocular movements intact.   Pulmonary:      Effort: Pulmonary effort is normal.   Neurological:      General: No focal deficit present.      Mental Status: She is alert and oriented to person, place, and time.   Psychiatric:         Mood and Affect: Mood normal.         Behavior: Behavior normal.         Thought Content: Thought content normal.         Judgment: Judgment normal.      Last Recorded Vitals  Virtual visits     Reviewed Labs  Lab Results   Component Value Date    GLUCOSE 100 (H) 02/29/2024    CALCIUM 9.3 02/29/2024     02/29/2024    K 4.7 02/29/2024    CO2 31 02/29/2024     02/29/2024    BUN 16 02/29/2024    CREATININE 0.66 02/29/2024         Assessment/Plan     Sasha Longoria is a 33 y.o. female recall past medical history of obesity BMI 61, anxiety and depression, PTSD, migraines  receives Botox injections, fibromyalgia on gabapentin 300 mg 3 times daily, who is being treated with IV infusion therapy for chronic widespread body aches and pains that radiate to upper and lower extremities.  She receives IV infusion therapy every 2 weeks which provides 50% relief in fibromyalgia pain for roughly 7-10 days.  This provides significant improvement in quality life and allows her to continue traveling weekly for work.  She has noticed a increase in activity level and doing things in moderation has helped prolong the effects of the IV infusion therapy.  She manages her pain with Tylenol and gabapentin.  She is very active with [facility] for anti-inflammatory diet and she also is now seeing a functional medicine provider to improve her health.  She continues to lose weight and is plan to start on Zepbound.  She denies any new neurological constitutional symptoms.  Given the relief the IV infusion therapy provides for widespread fibromyalgia pain plan to continue with IV infusion therapy every 2 weeks.  Plan to continue with chiropractic manipulations twice a month.  Encouraged to continue with home exercise regiment and healthy dietary intake to promote weight loss.  Plan to follow-up in 3 months or sooner if needed.    Plan  At least 50% of the visit was involved in the discussion of the options for treatment. We discussed exercises, medication, interventional therapies and surgery. Healthy life style is essential with patient hard work to achieve the wellness. In addition; discussion with the patient and/or family about any of the diagnostic results, impressions and/or recommended diagnostic studies, prognosis, risks and benefits of treatment options, instructions for treatment and/or follow-up, importance of compliance with chosen treatment options, risk-factor reduction, and patient/family education.      *Please note this report has been produced using speech recognition software and may  contain errors related to that system including grammar, punctuation and spelling as well as words and phrases that may be inappropriate. If there are questions or concerns, please feel free to contact me to clarify.      I spent 36 minutes in the professional and overall care of this patient.       Damien Dos Santos, DONALD-CNP

## 2024-09-12 ENCOUNTER — APPOINTMENT (OUTPATIENT)
Dept: INTEGRATIVE MEDICINE | Facility: CLINIC | Age: 34
End: 2024-09-12
Payer: COMMERCIAL

## 2024-09-12 DIAGNOSIS — M79.10 MYALGIA: ICD-10-CM

## 2024-09-12 DIAGNOSIS — M99.01 SOMATIC DYSFUNCTION OF CERVICAL REGION: Primary | ICD-10-CM

## 2024-09-12 DIAGNOSIS — M99.02 SOMATIC DYSFUNCTION OF THORACIC REGION: ICD-10-CM

## 2024-09-12 DIAGNOSIS — G89.29 CHRONIC NECK PAIN: ICD-10-CM

## 2024-09-12 DIAGNOSIS — M54.2 CHRONIC NECK PAIN: ICD-10-CM

## 2024-09-12 DIAGNOSIS — M99.03 SOMATIC DYSFUNCTION OF LUMBAR REGION: ICD-10-CM

## 2024-09-12 DIAGNOSIS — M99.04 SACROILIAC JOINT SOMATIC DYSFUNCTION: ICD-10-CM

## 2024-09-12 PROCEDURE — 97112 NEUROMUSCULAR REEDUCATION: CPT | Performed by: CHIROPRACTOR

## 2024-09-12 PROCEDURE — 98941 CHIROPRACT MANJ 3-4 REGIONS: CPT | Performed by: CHIROPRACTOR

## 2024-09-16 ENCOUNTER — INFUSION (OUTPATIENT)
Dept: INFUSION THERAPY | Facility: CLINIC | Age: 34
End: 2024-09-16
Payer: COMMERCIAL

## 2024-09-16 VITALS
RESPIRATION RATE: 22 BRPM | SYSTOLIC BLOOD PRESSURE: 127 MMHG | HEART RATE: 91 BPM | DIASTOLIC BLOOD PRESSURE: 60 MMHG | OXYGEN SATURATION: 96 % | TEMPERATURE: 98.1 F

## 2024-09-16 DIAGNOSIS — M79.7 FIBROMYALGIA: ICD-10-CM

## 2024-09-16 LAB — PREGNANCY TEST URINE, POC: NEGATIVE

## 2024-09-16 PROCEDURE — 96368 THER/DIAG CONCURRENT INF: CPT | Mod: INF

## 2024-09-16 PROCEDURE — 2500000005 HC RX 250 GENERAL PHARMACY W/O HCPCS: Performed by: NURSE PRACTITIONER

## 2024-09-16 PROCEDURE — 96375 TX/PRO/DX INJ NEW DRUG ADDON: CPT | Mod: INF

## 2024-09-16 PROCEDURE — 2500000004 HC RX 250 GENERAL PHARMACY W/ HCPCS (ALT 636 FOR OP/ED): Performed by: NURSE PRACTITIONER

## 2024-09-16 PROCEDURE — 81025 URINE PREGNANCY TEST: CPT

## 2024-09-16 PROCEDURE — 96365 THER/PROPH/DIAG IV INF INIT: CPT | Mod: INF

## 2024-09-16 RX ORDER — KETOROLAC TROMETHAMINE 30 MG/ML
30 INJECTION, SOLUTION INTRAMUSCULAR; INTRAVENOUS ONCE
Status: COMPLETED | OUTPATIENT
Start: 2024-09-16 | End: 2024-09-16

## 2024-09-16 RX ORDER — METOCLOPRAMIDE HYDROCHLORIDE 5 MG/ML
10 INJECTION INTRAMUSCULAR; INTRAVENOUS ONCE
OUTPATIENT
Start: 2024-09-30 | End: 2024-09-30

## 2024-09-16 RX ORDER — EPINEPHRINE 0.3 MG/.3ML
0.3 INJECTION SUBCUTANEOUS EVERY 5 MIN PRN
OUTPATIENT
Start: 2024-09-30

## 2024-09-16 RX ORDER — ONDANSETRON HYDROCHLORIDE 2 MG/ML
4 INJECTION, SOLUTION INTRAVENOUS ONCE
OUTPATIENT
Start: 2024-09-30 | End: 2024-09-30

## 2024-09-16 RX ORDER — KETAMINE HCL IN NACL, ISO-OSM 100MG/10ML
SYRINGE (ML) INJECTION ONCE
OUTPATIENT
Start: 2024-09-30

## 2024-09-16 RX ORDER — DIPHENHYDRAMINE HYDROCHLORIDE 50 MG/ML
50 INJECTION INTRAMUSCULAR; INTRAVENOUS AS NEEDED
OUTPATIENT
Start: 2024-09-30

## 2024-09-16 RX ORDER — FAMOTIDINE 10 MG/ML
20 INJECTION INTRAVENOUS ONCE AS NEEDED
OUTPATIENT
Start: 2024-09-30

## 2024-09-16 RX ORDER — DIPHENHYDRAMINE HYDROCHLORIDE 50 MG/ML
25 INJECTION INTRAMUSCULAR; INTRAVENOUS ONCE
OUTPATIENT
Start: 2024-09-30 | End: 2024-09-30

## 2024-09-16 RX ORDER — NITROGLYCERIN 0.4 MG/1
0.4 TABLET SUBLINGUAL ONCE
OUTPATIENT
Start: 2024-09-30 | End: 2024-09-30

## 2024-09-16 RX ORDER — KETAMINE HCL IN NACL, ISO-OSM 100MG/10ML
SYRINGE (ML) INJECTION ONCE
Status: COMPLETED | OUTPATIENT
Start: 2024-09-16 | End: 2024-09-16

## 2024-09-16 RX ORDER — METOPROLOL TARTRATE 25 MG/1
25 TABLET, FILM COATED ORAL ONCE
OUTPATIENT
Start: 2024-09-30 | End: 2024-09-30

## 2024-09-16 RX ORDER — ALBUTEROL SULFATE 0.83 MG/ML
3 SOLUTION RESPIRATORY (INHALATION) AS NEEDED
OUTPATIENT
Start: 2024-09-30

## 2024-09-16 RX ORDER — KETOROLAC TROMETHAMINE 30 MG/ML
30 INJECTION, SOLUTION INTRAMUSCULAR; INTRAVENOUS ONCE
OUTPATIENT
Start: 2024-09-30 | End: 2024-09-30

## 2024-09-16 ASSESSMENT — ENCOUNTER SYMPTOMS
LOSS OF SENSATION IN FEET: 1
OCCASIONAL FEELINGS OF UNSTEADINESS: 0
DEPRESSION: 0

## 2024-09-16 ASSESSMENT — COLUMBIA-SUICIDE SEVERITY RATING SCALE - C-SSRS
6. HAVE YOU EVER DONE ANYTHING, STARTED TO DO ANYTHING, OR PREPARED TO DO ANYTHING TO END YOUR LIFE?: NO
2. HAVE YOU ACTUALLY HAD ANY THOUGHTS OF KILLING YOURSELF?: NO
1. IN THE PAST MONTH, HAVE YOU WISHED YOU WERE DEAD OR WISHED YOU COULD GO TO SLEEP AND NOT WAKE UP?: NO
6. HAVE YOU EVER DONE ANYTHING, STARTED TO DO ANYTHING, OR PREPARED TO DO ANYTHING TO END YOUR LIFE?: NO

## 2024-09-16 ASSESSMENT — PAIN SCALES - GENERAL: PAINLEVEL_OUTOF10: 1

## 2024-09-16 NOTE — PATIENT INSTRUCTIONS
Today :Sasha Longoria had no medications administered during this visit.     For:   1. Fibromyalgia            (Tell all doctors including dentists that you are taking this medication)     Go to the emergency room or call 911 if:  -You have signs of allergic reaction:   -Rash, hives, itching.   -Swollen, blistered, peeling skin.   -Swelling of face, lips, mouth, tongue or throat.   -Tightness of chest, trouble breathing, swallowing or talking     Call your doctor:  - If IV / injection site gets red, warm, swollen, itchy or leaks fluid or pus.     (Leave dressing on your IV site for at least 2 hours and keep area clean and dry  - If you get sick or have symptoms of infection or are not feeling well for any reason.    (Wash your hands often, stay away from people who are sick)  - If you have side effects from your medication that do not go away or are bothersome.     (Refer to the teaching your nurse gave you for side effects to call your doctor about)    - Common side effects may include:  stuffy nose, headache, feeling tired, muscle aches, upset stomach  - Before receiving any vaccines     - Call the [facility] at   If:  - You get sick, are on antibiotics, have had a recent vaccine, have surgery or dental work and your doctor wants your visit rescheduled.  - You need to cancel and reschedule your visit for any reason. Call at least 2 days before your visit if you need to cancel.   - Your insurance changes before your next visit.    (We will need to get approval from your new insurance. This can take up to two weeks.)     The [facility] is opened Monday thru Friday. We are closed on weekends and holidays.   Voice mail will take your call if the center is closed. If you leave a message please allow 24 hours for a call back during weekdays. If you leave a message on a weekend/holiday, we will call you back the next business day.              [facility] OUT[facility]IENT [place]      Pain Infusion  Aftercare Instructions      1. It is normal to feel sedated, tired and low in energy after a pain infusion. DO NOT DRIVE, OPERATE ANY MACHINERY, OR MAKE ANY IMPORTANT DECISIONS FOR AT LEAST 24 HOURS AFTER THE INFUSION.     2. Call the pain center at [phone] with any problems, questions, or concerns.     3. Eat light after the infusion. If you feel queasy or sick to your stomach, laying down with your eyes closed may help. When you resume eating start with something mild like clear liquids, yogurt, applesauce, crackers, etc… Gradually advance to a regular diet.     4. Do not leave your house alone the evening of your pain infusion.     5. No alcohol or sedative medications, such as sleeping pills, for 24 hours after your pain infusion.     6. Resume all other prescribed medications unless directed otherwise by you physician.     7. If you have any medical emergencies, call 911 or go directly to the closest emergency room.

## 2024-09-17 ENCOUNTER — ALLIED HEALTH (OUTPATIENT)
Dept: INTEGRATIVE MEDICINE | Facility: CLINIC | Age: 34
End: 2024-09-17
Payer: COMMERCIAL

## 2024-09-17 DIAGNOSIS — M99.02 SOMATIC DYSFUNCTION OF THORACIC REGION: ICD-10-CM

## 2024-09-17 DIAGNOSIS — M99.01 SOMATIC DYSFUNCTION OF CERVICAL REGION: Primary | ICD-10-CM

## 2024-09-17 DIAGNOSIS — M99.04 SACROILIAC JOINT SOMATIC DYSFUNCTION: ICD-10-CM

## 2024-09-17 DIAGNOSIS — M54.2 CHRONIC NECK PAIN: ICD-10-CM

## 2024-09-17 DIAGNOSIS — M54.40 CHRONIC LOW BACK PAIN WITH SCIATICA, SCIATICA LATERALITY UNSPECIFIED, UNSPECIFIED BACK PAIN LATERALITY: ICD-10-CM

## 2024-09-17 DIAGNOSIS — M54.2 NECK PAIN: ICD-10-CM

## 2024-09-17 DIAGNOSIS — G89.29 CHRONIC LOW BACK PAIN WITH SCIATICA, SCIATICA LATERALITY UNSPECIFIED, UNSPECIFIED BACK PAIN LATERALITY: ICD-10-CM

## 2024-09-17 DIAGNOSIS — M79.10 MYALGIA: ICD-10-CM

## 2024-09-17 DIAGNOSIS — G89.29 CHRONIC NECK PAIN: ICD-10-CM

## 2024-09-17 DIAGNOSIS — M99.03 SOMATIC DYSFUNCTION OF LUMBAR REGION: ICD-10-CM

## 2024-09-17 PROCEDURE — 98941 CHIROPRACT MANJ 3-4 REGIONS: CPT | Performed by: CHIROPRACTOR

## 2024-09-17 PROCEDURE — 97140 MANUAL THERAPY 1/> REGIONS: CPT | Performed by: CHIROPRACTOR

## 2024-09-17 ASSESSMENT — ENCOUNTER SYMPTOMS
DIFFICULTY URINATING: 0
AGITATION: 0
DIZZINESS: 0
COLOR CHANGE: 0
DYSURIA: 0
NAUSEA: 0
SHORTNESS OF BREATH: 0
FATIGUE: 1
FEVER: 0
DIARRHEA: 0
VOMITING: 0

## 2024-09-17 NOTE — PROGRESS NOTES
Assessment/Plan   Today's presentation is consistent with fibromyalgia/myofascial pain syndrome alongside postural strain and somatic dysfunction contributing to her pain syndrome, also has been diagnosed with inflammatory arthritis (not rheumatoid), and has had elevated CRP and been under rheumatologic care.  Complicating factors include chronicity of symptoms as well as body habitus.  We will implement a trial of care to include various manipulative techniques which will range from instrument assisted to diversified.  We will utilize soft tissue techniques such as active release technique to the cervical spine musculature.  We will closely monitor their response to care to determine if any changes need to occur, if advanced imaging is needed, or if referral to other providers is appropriate. She will also be receiving acupuncture and integrative medicine consultation which is appropriate when considering her overall presentation     LS radiographs 2024 - IMPRESSION:  No acute pathologic findings are identified.    CS radiographs 2024 - IMPRESSION:  No pathologic findings are identified.    TS radiographs 2024 - IMPRESSION:  No acute pathologic findings are identified.  Thoracic lumbar dextroscoliosis.  Lower thoracic mild spondylosis.    Visits this year: 10 (under new insurance)    Subjective   Patient endorses frequent headaches, neck pain and back pain. Neck and back pain are 4/10 NRS today. She endorses a current headache. She recently had Botox injections to help with her headaches, but she has not noticed significant relief yet.     HPI - 10/05/23 (CR): the patient presents today per the referral of Dr. Suárez and pain management for evaluation and and management of chronic full spine complaints. She has in the past been diagnosed with fibromyalgia. She has dealt with pain for several years and has received chiropractic care in the past. She did have mixed results with chiropractic care in the past,  reporting a variation of instrument assisted manipulation and manual manipulation. But she wished to attend chiropractic care again through a hospital-based provider. She is under the care of pain management and will be starting infusions next week. She is also on the wait list for infusions at [facility] if needed. Overall, her pain levels remain moderate to severe. Her pain is constant. Symptoms in the lower back and hips seem to be the area of most intensity. But she does continue to experience pain throughout the spine. She experiences paresthesias in the upper and lower extremities bilaterally. She has difficulty finding any comfortable positions     Review of Systems   Constitutional:  Positive for fatigue. Negative for fever.   Eyes:  Negative for visual disturbance.   Respiratory:  Negative for shortness of breath.    Cardiovascular:  Negative for chest pain.   Gastrointestinal:  Negative for diarrhea, nausea and vomiting.   Genitourinary:  Negative for difficulty urinating and dysuria.   Skin:  Negative for color change.   Neurological:  Negative for dizziness.   Psychiatric/Behavioral:  Negative for agitation.    All other systems reviewed and are negative.    Objective   Examination findings (e.g., palpation & ROM): Decreased C/S and L/S ROM with pain, hypertonic and tender cervical and lumbar erectors     Segmental joint dysfunction was identified in the following areas using motion palpation and/or pain provocation assessment:  Cervical: 2  Thoracic: 5-8  Lumbopelvic: 1-3, BL SIJ      Physical Exam  Neurological:      General: No focal deficit present.      Mental Status: She is alert.      Motor: Motor function is intact.      Coordination: Coordination is intact.      Gait: Gait is intact.      Deep Tendon Reflexes: Reflexes are normal and symmetric.      Reflex Scores:       Tricep reflexes are 2+ on the right side and 2+ on the left side.       Bicep reflexes are 2+ on the right side and 2+ on  the left side.       Brachioradialis reflexes are 2+ on the right side and 2+ on the left side.       Patellar reflexes are 2+ on the right side and 2+ on the left side.       Achilles reflexes are 2+ on the right side and 2+ on the left side.     Comments: Cam Mosqueda's BL  9/3/24       Plan   Today's treatment:  SMT to regions of segmental dysfunction identified on exam, using age-appropriate force, and manual diversified technique.   STM to patient tolerance to hypertonic paraspinal muscles  Manual dynamic stretching of the gluteal muscles, hamstrings, upper trapezius  9:20 to 9:36 AM  Patient noted improved mobility and reduced pain post-treatment    Treatment Plan:   The patient and I discussed the risks and benefits of chiropractic care. Based on the patient's subjective complaints along with the examination findings, it is advised that a course of chiropractic treatment be initiated. The patient provided consent for care. The patient tolerated today's treatment with little or no additional discomfort and was instructed to contact the office for questions or concerns. Will see patient once per week then every 2 weeks when symptoms become mild/manageable, further spaced apart contingent upon improvement.     This chart note was generated using dictation software, and as such, there may be typographical errors present. Abbreviations: Cervical spine (CS), cervical-thoracic (CT), Dry needling (DN), Flexion adduction internal rotation (FAIR), high velocity, low amplitude (HVLA), Lumbar spine (LS), Soft tissue manipulation (STM), spinal manipulative therapy (SMT), Straight leg raise (SLR), Thoracic spine (TS).

## 2024-09-24 ENCOUNTER — APPOINTMENT (OUTPATIENT)
Dept: INTEGRATIVE MEDICINE | Facility: CLINIC | Age: 34
End: 2024-09-24
Payer: COMMERCIAL

## 2024-09-24 DIAGNOSIS — K52.9 CHRONIC DIARRHEA: Primary | ICD-10-CM

## 2024-09-24 DIAGNOSIS — K92.1 HEMATOCHEZIA: ICD-10-CM

## 2024-09-24 DIAGNOSIS — R79.0 LOW IRON STORES: ICD-10-CM

## 2024-09-24 RX ORDER — SODIUM CHLORIDE, SODIUM LACTATE, POTASSIUM CHLORIDE, CALCIUM CHLORIDE 600; 310; 30; 20 MG/100ML; MG/100ML; MG/100ML; MG/100ML
20 INJECTION, SOLUTION INTRAVENOUS CONTINUOUS
OUTPATIENT
Start: 2024-09-24

## 2024-09-24 RX ORDER — ONDANSETRON HYDROCHLORIDE 2 MG/ML
4 INJECTION, SOLUTION INTRAVENOUS ONCE AS NEEDED
OUTPATIENT
Start: 2024-09-24

## 2024-09-24 RX ORDER — FERROUS SULFATE 325(65) MG
325 TABLET ORAL
Qty: 48 TABLET | Refills: 3 | Status: SHIPPED | OUTPATIENT
Start: 2024-09-24

## 2024-09-24 RX ORDER — SODIUM, POTASSIUM,MAG SULFATES 17.5-3.13G
1 SOLUTION, RECONSTITUTED, ORAL ORAL 2 TIMES DAILY
Qty: 354 ML | Refills: 0 | Status: SHIPPED | OUTPATIENT
Start: 2024-09-24

## 2024-09-25 ENCOUNTER — APPOINTMENT (OUTPATIENT)
Dept: NEUROSURGERY | Facility: CLINIC | Age: 34
End: 2024-09-25
Payer: COMMERCIAL

## 2024-09-25 VITALS
HEART RATE: 102 BPM | WEIGHT: 282 LBS | BODY MASS INDEX: 53.24 KG/M2 | TEMPERATURE: 97.5 F | SYSTOLIC BLOOD PRESSURE: 132 MMHG | DIASTOLIC BLOOD PRESSURE: 80 MMHG | HEIGHT: 61 IN

## 2024-09-25 DIAGNOSIS — M54.41 CHRONIC LOW BACK PAIN WITH BILATERAL SCIATICA, UNSPECIFIED BACK PAIN LATERALITY: ICD-10-CM

## 2024-09-25 DIAGNOSIS — M54.42 CHRONIC LOW BACK PAIN WITH BILATERAL SCIATICA, UNSPECIFIED BACK PAIN LATERALITY: ICD-10-CM

## 2024-09-25 DIAGNOSIS — M54.2 CERVICALGIA: Primary | ICD-10-CM

## 2024-09-25 DIAGNOSIS — Q67.5 CONGENITAL SCOLIOSIS: ICD-10-CM

## 2024-09-25 DIAGNOSIS — G89.29 CHRONIC LOW BACK PAIN WITH BILATERAL SCIATICA, UNSPECIFIED BACK PAIN LATERALITY: ICD-10-CM

## 2024-09-25 PROCEDURE — 1036F TOBACCO NON-USER: CPT | Performed by: NURSE PRACTITIONER

## 2024-09-25 PROCEDURE — 99203 OFFICE O/P NEW LOW 30 MIN: CPT | Performed by: NURSE PRACTITIONER

## 2024-09-25 PROCEDURE — 3008F BODY MASS INDEX DOCD: CPT | Performed by: NURSE PRACTITIONER

## 2024-09-25 ASSESSMENT — PATIENT HEALTH QUESTIONNAIRE - PHQ9
1. LITTLE INTEREST OR PLEASURE IN DOING THINGS: NOT AT ALL
SUM OF ALL RESPONSES TO PHQ9 QUESTIONS 1 & 2: 0
2. FEELING DOWN, DEPRESSED OR HOPELESS: NOT AT ALL

## 2024-09-25 ASSESSMENT — PAIN SCALES - GENERAL: PAINLEVEL: 6

## 2024-09-25 NOTE — PROGRESS NOTES
It was a pleasure to see Sasha Longoria on 9/25/2024. She is a 34 y.o. year old female who presents to the [facility] for evaluation of chronic low back pain / thoracolumbar scoliosis. Patient is referred by Mely Gutierrez, APR*. PMH is significant for morbid obesity, depression, PTSD, Fibromyalgia, migraine headaches, neck / lumbar pain, GERD, MIKAELA, h/o covid. She lives at home with her partner.    Sasha Longoria has had symptoms of back pain (points to cervicothoracic region of back) since middle school when she was diagnosed with osteochondrosis of the spine. She also has pain in low back. Progression of symptoms prompted entire spine (AP/LAT views only) x-rays (requested by PCP) in 06/2024. These were significant only for thoracolumbar dextroscoliosis. Her pain is worse with bending, standing, walking; alleviated with position change. Thus far, patient has tried activity adjustment, She with little to no improvement of symptoms. She denies change in bowel / bladder function, saddle anesthesia, imbalance, falls, difficulty dressing, difficulty holding / opening objects.     XR LUMBAR SPINE on 06/19/2024:  IMPRESSION:  No acute pathologic findings are identified.  Signed by: Beny Arauz    XR THORACIC SPINE on 06/19/2024:  IMPRESSION:  No acute pathologic findings are identified.  Thoracic lumbar dextroscoliosis.  Lower thoracic mild spondylosis.   Signed by: Beny Arauz    XR CERVICAL SPINE on 06/19/2024:  IMPRESSION:  No pathologic findings are identified.  Signed by: Beny Arauz     PREVIOUS TREATMENTS  Chiropractic over the past year, once every 3 - 4 weeks  Pain Management:   Integrative medicine 09/12/24, 09/17/24  Massage  Activity adjustment  PT - none recent  Home Exercise Program: not currently  NSAIDs  Gabapentin  Topical    Previous Spine Surgery: No     Smoker: No   Anticoagulation / Antiplatelets: No     ROS: 12 / 12 systems reviewed and  "are negative unless noted in HPI    /80 (BP Location: Left arm, Patient Position: Sitting, BP Cuff Size: Adult)   Pulse 102   Temp 36.4 °C (97.5 °F) (Temporal)   Ht 1.549 m (5' 1\")   Wt 128 kg (282 lb)   BMI 53.28 kg/m²     ON EXAM:  General: Morbidly obese female, awake/alert/oriented x 3, no distress, alert and cooperative  Skin: Warm and dry, no visible lesions / rashes  ENMT: Mucous membranes moist, no apparent injury  Head/Neck: No apparent injury  Respiratory/Thorax: Normal breathing with good chest expansion, thorax symmetric  Cardiovascular: No pitting edema, no JVD  Gastrointestinal: Non-distended  NEUROLOGICAL EXAM:  EOMI, face symmetric  Motor Strength: 5/5 in BUE / BLE  Muscle Tone: Normal without spasticity or contractures in all extremities  Muscle Bulk: Normal and symmetric in all extremities  Posture:  -- Cervical: Normal  -- Thoracic: Normal  -- Lumbar : Normal  Paraspinal muscle spasm/tenderness absent.  No palpable tenderness along the spinous processes.  Sensation: SILT in BUE / BLE  Gait: Normal; toe gait intact, heel gait intact, tandem gait intact  Deep Tendon Reflexes: 2+ BUE, BLE        Sasha Longoria has thoracolumbar dextroscoliosis, chronic LBP, chronic neck pain with no neuro deficits on exam today. We discussed rationale for dynamic lumbar / cervical imaging and for Physical Therapy for Home Exercise Program. Encouraged weight loss should she be a candidate for elective procedure consideration - she is in the midst of attempting weight loss. She can follow up in 8 weeks for reassessment. If not improved, will consider MRI C and / or L Spine if warranted. She verbalizes understanding and agreement with plan.  Mendy Canales, APRN-CNP      "

## 2024-09-26 DIAGNOSIS — Z12.11 COLON CANCER SCREENING: Primary | ICD-10-CM

## 2024-09-26 DIAGNOSIS — G43.001 MIGRAINE WITHOUT AURA AND WITH STATUS MIGRAINOSUS, NOT INTRACTABLE: Primary | ICD-10-CM

## 2024-09-26 RX ORDER — METHYLPREDNISOLONE 4 MG/1
TABLET ORAL
Qty: 21 TABLET | Refills: 0 | Status: SHIPPED | OUTPATIENT
Start: 2024-09-26 | End: 2024-10-03

## 2024-09-26 RX ORDER — SODIUM, POTASSIUM,MAG SULFATES 17.5-3.13G
1 SOLUTION, RECONSTITUTED, ORAL ORAL 2 TIMES DAILY
Qty: 354 ML | Refills: 0 | Status: SHIPPED | OUTPATIENT
Start: 2024-09-26

## 2024-09-30 ENCOUNTER — INFUSION (OUTPATIENT)
Dept: INFUSION THERAPY | Facility: CLINIC | Age: 34
End: 2024-09-30
Payer: COMMERCIAL

## 2024-09-30 VITALS
OXYGEN SATURATION: 97 % | BODY MASS INDEX: 52.11 KG/M2 | DIASTOLIC BLOOD PRESSURE: 94 MMHG | HEIGHT: 61 IN | TEMPERATURE: 97 F | SYSTOLIC BLOOD PRESSURE: 148 MMHG | WEIGHT: 276 LBS | HEART RATE: 89 BPM | RESPIRATION RATE: 17 BRPM

## 2024-09-30 DIAGNOSIS — M79.7 FIBROMYALGIA: Primary | ICD-10-CM

## 2024-09-30 LAB — PREGNANCY TEST URINE, POC: NEGATIVE

## 2024-09-30 PROCEDURE — 81025 URINE PREGNANCY TEST: CPT

## 2024-09-30 PROCEDURE — 2500000004 HC RX 250 GENERAL PHARMACY W/ HCPCS (ALT 636 FOR OP/ED): Performed by: NURSE PRACTITIONER

## 2024-09-30 PROCEDURE — 2500000005 HC RX 250 GENERAL PHARMACY W/O HCPCS: Performed by: NURSE PRACTITIONER

## 2024-09-30 PROCEDURE — 96375 TX/PRO/DX INJ NEW DRUG ADDON: CPT | Mod: INF

## 2024-09-30 PROCEDURE — 96368 THER/DIAG CONCURRENT INF: CPT | Mod: INF

## 2024-09-30 PROCEDURE — 96365 THER/PROPH/DIAG IV INF INIT: CPT | Mod: INF

## 2024-09-30 RX ORDER — KETAMINE HCL IN NACL, ISO-OSM 100MG/10ML
SYRINGE (ML) INJECTION ONCE
OUTPATIENT
Start: 2024-10-14

## 2024-09-30 RX ORDER — DIPHENHYDRAMINE HYDROCHLORIDE 50 MG/ML
25 INJECTION INTRAMUSCULAR; INTRAVENOUS ONCE
OUTPATIENT
Start: 2024-10-14 | End: 2024-10-14

## 2024-09-30 RX ORDER — ALBUTEROL SULFATE 0.83 MG/ML
3 SOLUTION RESPIRATORY (INHALATION) AS NEEDED
OUTPATIENT
Start: 2024-10-14

## 2024-09-30 RX ORDER — EPINEPHRINE 0.3 MG/.3ML
0.3 INJECTION SUBCUTANEOUS EVERY 5 MIN PRN
OUTPATIENT
Start: 2024-10-14

## 2024-09-30 RX ORDER — FAMOTIDINE 10 MG/ML
20 INJECTION INTRAVENOUS ONCE AS NEEDED
OUTPATIENT
Start: 2024-10-14

## 2024-09-30 RX ORDER — METOPROLOL TARTRATE 25 MG/1
25 TABLET, FILM COATED ORAL ONCE
OUTPATIENT
Start: 2024-10-14 | End: 2024-10-14

## 2024-09-30 RX ORDER — KETOROLAC TROMETHAMINE 30 MG/ML
30 INJECTION, SOLUTION INTRAMUSCULAR; INTRAVENOUS ONCE
Status: COMPLETED | OUTPATIENT
Start: 2024-09-30 | End: 2024-09-30

## 2024-09-30 RX ORDER — METOCLOPRAMIDE HYDROCHLORIDE 5 MG/ML
10 INJECTION INTRAMUSCULAR; INTRAVENOUS ONCE
OUTPATIENT
Start: 2024-10-14 | End: 2024-10-14

## 2024-09-30 RX ORDER — DIPHENHYDRAMINE HYDROCHLORIDE 50 MG/ML
50 INJECTION INTRAMUSCULAR; INTRAVENOUS AS NEEDED
OUTPATIENT
Start: 2024-10-14

## 2024-09-30 RX ORDER — KETAMINE HCL IN NACL, ISO-OSM 100MG/10ML
SYRINGE (ML) INJECTION ONCE
Status: COMPLETED | OUTPATIENT
Start: 2024-09-30 | End: 2024-09-30

## 2024-09-30 RX ORDER — ONDANSETRON HYDROCHLORIDE 2 MG/ML
4 INJECTION, SOLUTION INTRAVENOUS ONCE
OUTPATIENT
Start: 2024-10-14 | End: 2024-10-14

## 2024-09-30 RX ORDER — KETOROLAC TROMETHAMINE 30 MG/ML
30 INJECTION, SOLUTION INTRAMUSCULAR; INTRAVENOUS ONCE
OUTPATIENT
Start: 2024-10-14 | End: 2024-10-14

## 2024-09-30 RX ORDER — NITROGLYCERIN 0.4 MG/1
0.4 TABLET SUBLINGUAL ONCE
OUTPATIENT
Start: 2024-10-14 | End: 2024-10-14

## 2024-09-30 SDOH — ECONOMIC STABILITY: FOOD INSECURITY: WITHIN THE PAST 12 MONTHS, YOU WORRIED THAT YOUR FOOD WOULD RUN OUT BEFORE YOU GOT MONEY TO BUY MORE.: NEVER TRUE

## 2024-09-30 SDOH — ECONOMIC STABILITY: FOOD INSECURITY: WITHIN THE PAST 12 MONTHS, THE FOOD YOU BOUGHT JUST DIDN'T LAST AND YOU DIDN'T HAVE MONEY TO GET MORE.: NEVER TRUE

## 2024-09-30 ASSESSMENT — LIFESTYLE VARIABLES
SKIP TO QUESTIONS 9-10: 1
HOW MANY STANDARD DRINKS CONTAINING ALCOHOL DO YOU HAVE ON A TYPICAL DAY: PATIENT DOES NOT DRINK
AUDIT-C TOTAL SCORE: 0
HOW OFTEN DO YOU HAVE A DRINK CONTAINING ALCOHOL: NEVER
HOW OFTEN DO YOU HAVE SIX OR MORE DRINKS ON ONE OCCASION: NEVER

## 2024-09-30 ASSESSMENT — ENCOUNTER SYMPTOMS
LOSS OF SENSATION IN FEET: 1
DEPRESSION: 0
OCCASIONAL FEELINGS OF UNSTEADINESS: 0

## 2024-09-30 ASSESSMENT — PAIN SCALES - GENERAL: PAINLEVEL_OUTOF10: 7

## 2024-09-30 ASSESSMENT — PAIN - FUNCTIONAL ASSESSMENT: PAIN_FUNCTIONAL_ASSESSMENT: 0-10

## 2024-09-30 NOTE — PATIENT INSTRUCTIONS
Today :We administered ketamine 30 mg-lidocaine 300 mg, propofol, and ketorolac.     For:   1. Fibromyalgia         Your next appointment is due in:  10/22/24  [facility]      Pain Infusion Aftercare Instructions      1. It is normal to feel sedated, tired and low in energy after a pain infusion. DO NOT DRIVE, OPERATE ANY MACHINERY, OR MAKE ANY IMPORTANT DECISIONS FOR AT LEAST 24 HOURS AFTER THE INFUSION.     2. Call the pain center at [phone] with any problems, questions, or concerns.     3. Eat light after the infusion. If you feel queasy or sick to your stomach, laying down with your eyes closed may help. When you resume eating start with something mild like clear liquids, yogurt, applesauce, crackers, etc… Gradually advance to a regular diet.     4. Do not leave your house alone the evening of your pain infusion.     5. No alcohol or sedative medications, such as sleeping pills, for 24 hours after your pain infusion.     6. Resume all other prescribed medications unless directed otherwise by you physician.     7. If you have any medical emergencies, call 911 or go directly to the closest emergency room.        Please read the  Medication Guide that was given to you and reviewed during todays visit.     (Tell all doctors including dentists that you are taking this medication)     Go to the emergency room or call 911 if:  -You have signs of allergic reaction:   -Rash, hives, itching.   -Swollen, blistered, peeling skin.   -Swelling of face, lips, mouth, tongue or throat.   -Tightness of chest, trouble breathing, swallowing or talking     Call your doctor:  - If IV / injection site gets red, warm, swollen, itchy or leaks fluid or pus.     (Leave dressing on your IV site for at least 2 hours and keep area clean and dry  - If you get sick or have symptoms of infection or are not feeling well for any reason.    (Wash your hands often, stay away from people who are sick)  - If you have  side effects from your medication that do not go away or are bothersome.     (Refer to the teaching your nurse gave you for side effects to call your doctor about)    - Common side effects may include:  stuffy nose, headache, feeling tired, muscle aches, upset stomach  - Before receiving any vaccines     - Call the [facility] at   If:  - You get sick, are on antibiotics, have had a recent vaccine, have surgery or dental work and your doctor wants your visit rescheduled.  - You need to cancel and reschedule your visit for any reason. Call at least 2 days before your visit if you need to cancel.   - Your insurance changes before your next visit.    (We will need to get approval from your new insurance. This can take up to two weeks.)     The [facility] is opened Monday thru Friday. We are closed on weekends and holidays.   Voice mail will take your call if the center is closed. If you leave a message please allow 24 hours for a call back during weekdays. If you leave a message on a weekend/holiday, we will call you back the next business day.

## 2024-09-30 NOTE — PROGRESS NOTES
S: Patient here for #22 opioid sparing pain infusion. Patient reports 50% reduction in pain after last infusion that lasted 8-10 days.    Purpose of pain infusion meds explained along with potential side effects.  Patient verbalized understanding.    B: Pain Issues. 7/10 generalized, head & back Is Patient breast feeding: ?    A: Patient currently has pain described on flow sheet documentation. Designated  is Adrian Connor -[id number]. Patient last ate solid food 24 hours ago, and had liquid 1 hours ago.    R: Plan; Obtain IV access, do patient risk assessment, and start opioid sparing infusion as ordered. Monitoring for S/S of adverse reactions.    Urine Preg Neg  Patient resting up in chair, no distress noted, warm blanket & pillow applied.  Patient very emotional crying  Patient tolerated ordered rate.  Post infusion teaching provided. Patient verbalized understanding. VSS, Patient states pain is 0. Will assist patient to waiting car via wheelchair.

## 2024-10-01 ENCOUNTER — ALLIED HEALTH (OUTPATIENT)
Dept: INTEGRATIVE MEDICINE | Facility: CLINIC | Age: 34
End: 2024-10-01
Payer: COMMERCIAL

## 2024-10-01 ENCOUNTER — APPOINTMENT (OUTPATIENT)
Dept: PHARMACY | Facility: HOSPITAL | Age: 34
End: 2024-10-01
Payer: COMMERCIAL

## 2024-10-01 DIAGNOSIS — G89.29 CHRONIC LOW BACK PAIN WITH SCIATICA, SCIATICA LATERALITY UNSPECIFIED, UNSPECIFIED BACK PAIN LATERALITY: ICD-10-CM

## 2024-10-01 DIAGNOSIS — M99.04 SACROILIAC JOINT SOMATIC DYSFUNCTION: ICD-10-CM

## 2024-10-01 DIAGNOSIS — G89.29 CHRONIC NECK PAIN: ICD-10-CM

## 2024-10-01 DIAGNOSIS — M54.40 CHRONIC LOW BACK PAIN WITH SCIATICA, SCIATICA LATERALITY UNSPECIFIED, UNSPECIFIED BACK PAIN LATERALITY: ICD-10-CM

## 2024-10-01 DIAGNOSIS — M54.2 NECK PAIN: ICD-10-CM

## 2024-10-01 DIAGNOSIS — M99.03 SOMATIC DYSFUNCTION OF LUMBAR REGION: ICD-10-CM

## 2024-10-01 DIAGNOSIS — M79.10 MYALGIA: ICD-10-CM

## 2024-10-01 DIAGNOSIS — M99.01 SOMATIC DYSFUNCTION OF CERVICAL REGION: Primary | ICD-10-CM

## 2024-10-01 DIAGNOSIS — M54.2 CHRONIC NECK PAIN: ICD-10-CM

## 2024-10-01 DIAGNOSIS — M99.02 SOMATIC DYSFUNCTION OF THORACIC REGION: ICD-10-CM

## 2024-10-01 ASSESSMENT — ENCOUNTER SYMPTOMS
AGITATION: 0
DIZZINESS: 0
COLOR CHANGE: 0
DIARRHEA: 0
SHORTNESS OF BREATH: 0
DIFFICULTY URINATING: 0
DYSURIA: 0
FEVER: 0
NAUSEA: 0
FATIGUE: 1
VOMITING: 0

## 2024-10-01 NOTE — PROGRESS NOTES
Assessment/Plan   Today's presentation is consistent with fibromyalgia/myofascial pain syndrome alongside postural strain and somatic dysfunction contributing to her pain syndrome, also has been diagnosed with inflammatory arthritis (not rheumatoid), and has had elevated CRP and been under rheumatologic care.  Complicating factors include chronicity of symptoms as well as body habitus.  We will implement a trial of care to include various manipulative techniques which will range from instrument assisted to diversified.  We will utilize soft tissue techniques such as active release technique to the cervical spine musculature.  We will closely monitor their response to care to determine if any changes need to occur, if advanced imaging is needed, or if referral to other providers is appropriate. She will also be receiving acupuncture and integrative medicine consultation which is appropriate when considering her overall presentation     LS radiographs 2024 - IMPRESSION:  No acute pathologic findings are identified.    CS radiographs 2024 - IMPRESSION:  No pathologic findings are identified.    TS radiographs 2024 - IMPRESSION:  No acute pathologic findings are identified.  Thoracic lumbar dextroscoliosis.  Lower thoracic mild spondylosis.    Visits this year: 11 (under new insurance)    Subjective   Patient reports frequent moderate pain and stiffness in the neck and mid back also endorses she is at the tail end of a headache at the moment.  Has not had any long stretches of time without headache she is typically either having a headache or in between headaches for the past year or 2 and is having trouble getting any lasting relief.  Thus far has not had any relief from the Botox injection for the headaches but is going to follow-up and then maybe get another one as well.  Also had ketamine infusion yesterday which gave some relief but did not completely eliminate her symptoms.  Is working with functional medicine to  try to improve gastrointestinal related inflammation and hopefully overall symptoms that she is making some small changes to her diet to see what is working and what is not    HPI - 10/05/23 (CR): the patient presents today per the referral of Dr. Suárez and pain management for evaluation and and management of chronic full spine complaints. She has in the past been diagnosed with fibromyalgia. She has dealt with pain for several years and has received chiropractic care in the past. She did have mixed results with chiropractic care in the past, reporting a variation of instrument assisted manipulation and manual manipulation. But she wished to attend chiropractic care again through a hospital-based provider. She is under the care of pain management and will be starting infusions next week. She is also on the wait list for infusions at [facility] if needed. Overall, her pain levels remain moderate to severe. Her pain is constant. Symptoms in the lower back and hips seem to be the area of most intensity. But she does continue to experience pain throughout the spine. She experiences paresthesias in the upper and lower extremities bilaterally. She has difficulty finding any comfortable positions     Review of Systems   Constitutional:  Positive for fatigue. Negative for fever.   Eyes:  Negative for visual disturbance.   Respiratory:  Negative for shortness of breath.    Cardiovascular:  Negative for chest pain.   Gastrointestinal:  Negative for diarrhea, nausea and vomiting.   Genitourinary:  Negative for difficulty urinating and dysuria.   Skin:  Negative for color change.   Neurological:  Negative for dizziness.   Psychiatric/Behavioral:  Negative for agitation.    All other systems reviewed and are negative.    Objective   Examination findings (e.g., palpation & ROM): Decreased C/S and L/S ROM with pain, hypertonic and tender cervical and lumbar erectors, BL upper trapezius    Segmental joint dysfunction was  identified in the following areas using motion palpation and/or pain provocation assessment:  Cervical: 2  Thoracic: 5-8  Lumbopelvic: 1-3, BL SIJ      Physical Exam  Neurological:      General: No focal deficit present.      Mental Status: She is alert.      Motor: Motor function is intact.      Coordination: Coordination is intact.      Gait: Gait is intact.      Deep Tendon Reflexes: Reflexes are normal and symmetric.      Reflex Scores:       Tricep reflexes are 2+ on the right side and 2+ on the left side.       Bicep reflexes are 2+ on the right side and 2+ on the left side.       Brachioradialis reflexes are 2+ on the right side and 2+ on the left side.       Patellar reflexes are 2+ on the right side and 2+ on the left side.       Achilles reflexes are 2+ on the right side and 2+ on the left side.     Comments: Trace Jaylin's BL  9/3/24       Plan   Today's treatment:  SMT to regions of segmental dysfunction identified on exam, using age-appropriate force, and manual diversified technique.   STM to patient tolerance to hypertonic paraspinal muscles, upper trapezius  Manual dynamic stretching of the periscapular mm BL  10:23 to 10:18 AM  Patient noted improved mobility and reduced pain post-treatment    Treatment Plan:   The patient and I discussed the risks and benefits of chiropractic care. Based on the patient's subjective complaints along with the examination findings, it is advised that a course of chiropractic treatment be initiated. The patient provided consent for care. The patient tolerated today's treatment with little or no additional discomfort and was instructed to contact the office for questions or concerns. Will see patient once per week then every 2 weeks when symptoms become mild/manageable, further spaced apart contingent upon improvement.     This chart note was generated using dictation software, and as such, there may be typographical errors present. Abbreviations: Cervical spine (CS),  cervical-thoracic (CT), Dry needling (DN), Flexion adduction internal rotation (FAIR), high velocity, low amplitude (HVLA), Lumbar spine (LS), Soft tissue manipulation (STM), spinal manipulative therapy (SMT), Straight leg raise (SLR), Thoracic spine (TS).

## 2024-10-03 DIAGNOSIS — R51.9 CHRONIC DAILY HEADACHE: Primary | ICD-10-CM

## 2024-10-03 RX ORDER — PREDNISONE 10 MG/1
TABLET ORAL
Qty: 42 TABLET | Refills: 0 | Status: SHIPPED | OUTPATIENT
Start: 2024-10-03 | End: 2024-10-14

## 2024-10-03 ASSESSMENT — ENCOUNTER SYMPTOMS
DIZZINESS: 0
VOMITING: 0
COLOR CHANGE: 0
DYSURIA: 0
DIFFICULTY URINATING: 0
FATIGUE: 1
SHORTNESS OF BREATH: 0
FEVER: 0
DIARRHEA: 0
NAUSEA: 0
AGITATION: 0

## 2024-10-03 NOTE — PROGRESS NOTES
Assessment/Plan   Today's presentation is consistent with fibromyalgia/myofascial pain syndrome alongside postural strain and somatic dysfunction contributing to her pain syndrome, also has been diagnosed with inflammatory arthritis (not rheumatoid), and has had elevated CRP and been under rheumatologic care.  Complicating factors include chronicity of symptoms as well as body habitus.  We will implement a trial of care to include various manipulative techniques which will range from instrument assisted to diversified.  We will utilize soft tissue techniques such as active release technique to the cervical spine musculature.  We will closely monitor their response to care to determine if any changes need to occur, if advanced imaging is needed, or if referral to other providers is appropriate. She will also be receiving acupuncture and integrative medicine consultation which is appropriate when considering her overall presentation     LS radiographs 2024 - IMPRESSION:  No acute pathologic findings are identified.    CS radiographs 2024 - IMPRESSION:  No pathologic findings are identified.    TS radiographs 2024 - IMPRESSION:  No acute pathologic findings are identified.  Thoracic lumbar dextroscoliosis.  Lower thoracic mild spondylosis.    Visits this year: 12 (under new insurance)    Subjective   Patient reports that she got relief after last visit that lasted for about a week. She endorses a current headache located frontally, and notes occasional pain in the left occipital region. Her current neck pain is a 3/10 NRS and the headache is a 5-6/10 NRS. Thus far has not had any relief from the Botox injection for the headaches but is going to follow-up and then maybe get another one as well.     HPI - 10/05/23 (CR): the patient presents today per the referral of Dr. Suárez and pain management for evaluation and and management of chronic full spine complaints. She has in the past been diagnosed with fibromyalgia. She  has dealt with pain for several years and has received chiropractic care in the past. She did have mixed results with chiropractic care in the past, reporting a variation of instrument assisted manipulation and manual manipulation. But she wished to attend chiropractic care again through a hospital-based provider. She is under the care of pain management and will be starting infusions next week. She is also on the wait list for infusions at [facility] if needed. Overall, her pain levels remain moderate to severe. Her pain is constant. Symptoms in the lower back and hips seem to be the area of most intensity. But she does continue to experience pain throughout the spine. She experiences paresthesias in the upper and lower extremities bilaterally. She has difficulty finding any comfortable positions     Review of Systems   Constitutional:  Positive for fatigue. Negative for fever.   Eyes:  Negative for visual disturbance.   Respiratory:  Negative for shortness of breath.    Cardiovascular:  Negative for chest pain.   Gastrointestinal:  Negative for diarrhea, nausea and vomiting.   Genitourinary:  Negative for difficulty urinating and dysuria.   Skin:  Negative for color change.   Neurological:  Negative for dizziness.   Psychiatric/Behavioral:  Negative for agitation.    All other systems reviewed and are negative.    Objective   Examination findings (e.g., palpation & ROM): Decreased C/S and L/S ROM with pain, hypertonic and tender cervical and lumbar erectors, BL upper trapezius    Segmental joint dysfunction was identified in the following areas using motion palpation and/or pain provocation assessment:  Cervical: 2  Thoracic: 5-8  Lumbopelvic: 1-3, BL SIJ      Physical Exam  Neurological:      General: No focal deficit present.      Mental Status: She is alert.      Motor: Motor function is intact.      Coordination: Coordination is intact.      Gait: Gait is intact.      Deep Tendon Reflexes: Reflexes are  normal and symmetric.      Reflex Scores:       Tricep reflexes are 2+ on the right side and 2+ on the left side.       Bicep reflexes are 2+ on the right side and 2+ on the left side.       Brachioradialis reflexes are 2+ on the right side and 2+ on the left side.       Patellar reflexes are 2+ on the right side and 2+ on the left side.       Achilles reflexes are 2+ on the right side and 2+ on the left side.     Comments: Trace Jaylin's BL  9/3/24       Plan   Today's treatment:  SMT to regions of segmental dysfunction identified on exam, using age-appropriate force, and manual diversified technique.   STM to patient tolerance to hypertonic paraspinal muscles, upper trapezius  Manual dynamic stretching of the periscapular mm BL  1:20 to 1:38 PM  Patient noted improved mobility and reduced pain post-treatment    Treatment Plan:   The patient and I discussed the risks and benefits of chiropractic care. Based on the patient's subjective complaints along with the examination findings, it is advised that a course of chiropractic treatment be initiated. The patient provided consent for care. The patient tolerated today's treatment with little or no additional discomfort and was instructed to contact the office for questions or concerns. Will see patient once per week then every 2 weeks when symptoms become mild/manageable, further spaced apart contingent upon improvement.     This chart note was generated using dictation software, and as such, there may be typographical errors present. Abbreviations: Cervical spine (CS), cervical-thoracic (CT), Dry needling (DN), Flexion adduction internal rotation (FAIR), high velocity, low amplitude (HVLA), Lumbar spine (LS), Soft tissue manipulation (STM), spinal manipulative therapy (SMT), Straight leg raise (SLR), Thoracic spine (TS).

## 2024-10-07 DIAGNOSIS — K21.00 GASTROESOPHAGEAL REFLUX DISEASE WITH ESOPHAGITIS, UNSPECIFIED WHETHER HEMORRHAGE: Primary | ICD-10-CM

## 2024-10-07 RX ORDER — OMEPRAZOLE 40 MG/1
40 CAPSULE, DELAYED RELEASE ORAL DAILY
Qty: 30 CAPSULE | Refills: 11 | Status: SHIPPED | OUTPATIENT
Start: 2024-10-07 | End: 2025-10-07

## 2024-10-08 ENCOUNTER — ALLIED HEALTH (OUTPATIENT)
Dept: INTEGRATIVE MEDICINE | Facility: CLINIC | Age: 34
End: 2024-10-08
Payer: COMMERCIAL

## 2024-10-08 DIAGNOSIS — G89.29 CHRONIC LOW BACK PAIN WITH SCIATICA, SCIATICA LATERALITY UNSPECIFIED, UNSPECIFIED BACK PAIN LATERALITY: ICD-10-CM

## 2024-10-08 DIAGNOSIS — M54.40 CHRONIC LOW BACK PAIN WITH SCIATICA, SCIATICA LATERALITY UNSPECIFIED, UNSPECIFIED BACK PAIN LATERALITY: ICD-10-CM

## 2024-10-08 DIAGNOSIS — G89.29 CHRONIC NECK PAIN: ICD-10-CM

## 2024-10-08 DIAGNOSIS — M54.2 CHRONIC NECK PAIN: ICD-10-CM

## 2024-10-08 DIAGNOSIS — M99.02 SOMATIC DYSFUNCTION OF THORACIC REGION: ICD-10-CM

## 2024-10-08 DIAGNOSIS — M79.10 MYALGIA: ICD-10-CM

## 2024-10-08 DIAGNOSIS — M99.03 SOMATIC DYSFUNCTION OF LUMBAR REGION: ICD-10-CM

## 2024-10-08 DIAGNOSIS — M99.01 SOMATIC DYSFUNCTION OF CERVICAL REGION: Primary | ICD-10-CM

## 2024-10-08 DIAGNOSIS — M99.04 SACROILIAC JOINT SOMATIC DYSFUNCTION: ICD-10-CM

## 2024-10-08 PROCEDURE — 97112 NEUROMUSCULAR REEDUCATION: CPT | Performed by: CHIROPRACTOR

## 2024-10-08 PROCEDURE — 98941 CHIROPRACT MANJ 3-4 REGIONS: CPT | Performed by: CHIROPRACTOR

## 2024-10-14 ENCOUNTER — HOSPITAL ENCOUNTER (OUTPATIENT)
Dept: RADIOLOGY | Facility: HOSPITAL | Age: 34
Discharge: HOME | End: 2024-10-14
Payer: COMMERCIAL

## 2024-10-14 DIAGNOSIS — G89.29 CHRONIC LOW BACK PAIN WITH BILATERAL SCIATICA, UNSPECIFIED BACK PAIN LATERALITY: ICD-10-CM

## 2024-10-14 DIAGNOSIS — M54.41 CHRONIC LOW BACK PAIN WITH BILATERAL SCIATICA, UNSPECIFIED BACK PAIN LATERALITY: ICD-10-CM

## 2024-10-14 DIAGNOSIS — G43.001 MIGRAINE WITHOUT AURA AND WITH STATUS MIGRAINOSUS, NOT INTRACTABLE: Primary | ICD-10-CM

## 2024-10-14 DIAGNOSIS — Q67.5 CONGENITAL SCOLIOSIS: ICD-10-CM

## 2024-10-14 DIAGNOSIS — M54.42 CHRONIC LOW BACK PAIN WITH BILATERAL SCIATICA, UNSPECIFIED BACK PAIN LATERALITY: ICD-10-CM

## 2024-10-14 DIAGNOSIS — M54.2 CERVICALGIA: ICD-10-CM

## 2024-10-14 PROCEDURE — 72082 X-RAY EXAM ENTIRE SPI 2/3 VW: CPT | Performed by: RADIOLOGY

## 2024-10-14 PROCEDURE — 72110 X-RAY EXAM L-2 SPINE 4/>VWS: CPT

## 2024-10-14 PROCEDURE — 72082 X-RAY EXAM ENTIRE SPI 2/3 VW: CPT

## 2024-10-14 PROCEDURE — 72110 X-RAY EXAM L-2 SPINE 4/>VWS: CPT | Performed by: RADIOLOGY

## 2024-10-14 PROCEDURE — 72050 X-RAY EXAM NECK SPINE 4/5VWS: CPT

## 2024-10-14 PROCEDURE — 72050 X-RAY EXAM NECK SPINE 4/5VWS: CPT | Performed by: RADIOLOGY

## 2024-10-14 RX ORDER — DIVALPROEX SODIUM 250 MG/1
TABLET, DELAYED RELEASE ORAL
Qty: 16 TABLET | Refills: 0 | Status: SHIPPED | OUTPATIENT
Start: 2024-10-14 | End: 2024-10-20

## 2024-10-15 ASSESSMENT — ENCOUNTER SYMPTOMS
DYSURIA: 0
DIARRHEA: 0
DIFFICULTY URINATING: 0
SHORTNESS OF BREATH: 0
NAUSEA: 0
COLOR CHANGE: 0
AGITATION: 0
VOMITING: 0
FATIGUE: 1
DIZZINESS: 0
FEVER: 0

## 2024-10-15 NOTE — PROGRESS NOTES
Assessment/Plan   Today's presentation is consistent with fibromyalgia/myofascial pain syndrome alongside postural strain and somatic dysfunction contributing to her pain syndrome, also has been diagnosed with inflammatory arthritis (not rheumatoid), PCOS, and has had elevated ESR/CRP and been under rheumatologic care.  Complicating factors include chronicity of symptoms as well as body habitus.  We will implement a trial of care to include various manipulative techniques which will range from instrument assisted to diversified.  We will utilize soft tissue techniques such as active release technique to the cervical spine musculature.  We will closely monitor their response to care to determine if any changes need to occur, if advanced imaging is needed, or if referral to other providers is appropriate. She will also be receiving acupuncture and integrative medicine consultation which is appropriate when considering her overall presentation     Full spine EOS radiographs 2024 - 12 degree dextroconvex scoliosis from T11 to L3. Hyperkyphosis in TS and hyperlordosis in LS    LS radiographs 2024 - IMPRESSION:  No acute pathologic findings are identified.    CS radiographs 2024 - IMPRESSION:  No pathologic findings are identified.    TS radiographs 2024 - IMPRESSION:  No acute pathologic findings are identified.  Thoracic lumbar dextroscoliosis.  Lower thoracic mild spondylosis.    Visits this year: 12 (under new insurance)    Subjective   Patient reports a 5-6/10 intensity frontal headache as well as mild neck pain currently. She notes that her neck pain gets worse throughout the day. Patient is seeing neurology regarding her headaches, and she is scheduled to receive another Botox injection. She is still working with integrative medicine and currently doing an elimination diet to see if any dietary changes help with her symptoms.     HPI - 10/05/23 (CR): the patient presents today per the referral of Dr. Suárez and  pain management for evaluation and and management of chronic full spine complaints. She has in the past been diagnosed with fibromyalgia. She has dealt with pain for several years and has received chiropractic care in the past. She did have mixed results with chiropractic care in the past, reporting a variation of instrument assisted manipulation and manual manipulation. But she wished to attend chiropractic care again through a hospital-based provider. She is under the care of pain management and will be starting infusions next week. She is also on the wait list for infusions at [facility] if needed. Overall, her pain levels remain moderate to severe. Her pain is constant. Symptoms in the lower back and hips seem to be the area of most intensity. But she does continue to experience pain throughout the spine. She experiences paresthesias in the upper and lower extremities bilaterally. She has difficulty finding any comfortable positions     Review of Systems   Constitutional:  Positive for fatigue. Negative for fever.   Eyes:  Negative for visual disturbance.   Respiratory:  Negative for shortness of breath.    Cardiovascular:  Negative for chest pain.   Gastrointestinal:  Negative for diarrhea, nausea and vomiting.   Genitourinary:  Negative for difficulty urinating and dysuria.   Skin:  Negative for color change.   Neurological:  Negative for dizziness.   Psychiatric/Behavioral:  Negative for agitation.    All other systems reviewed and are negative.    Objective   Examination findings (e.g., palpation & ROM): Decreased C/S and L/S ROM with pain, hypertonic and tender cervical and lumbar erectors, BL upper trapezius    Segmental joint dysfunction was identified in the following areas using motion palpation and/or pain provocation assessment:  Cervical: 2  Thoracic: 5-8  Lumbopelvic: 1-3, BL SIJ      Physical Exam  Neurological:      General: No focal deficit present.      Mental Status: She is alert.       Motor: Motor function is intact.      Coordination: Coordination is intact.      Gait: Gait is intact.      Deep Tendon Reflexes: Reflexes are normal and symmetric.      Reflex Scores:       Tricep reflexes are 2+ on the right side and 2+ on the left side.       Bicep reflexes are 2+ on the right side and 2+ on the left side.       Brachioradialis reflexes are 2+ on the right side and 2+ on the left side.       Patellar reflexes are 2+ on the right side and 2+ on the left side.       Achilles reflexes are 2+ on the right side and 2+ on the left side.     Comments: Trace Jaylin's BL  9/3/24       Plan   Today's treatment:  SMT to regions of segmental dysfunction identified on exam, using age-appropriate force, and manual diversified technique.   Mobilization on CS  STM to patient tolerance to hypertonic paraspinal muscles, upper trapezius  Manual dynamic stretching of the periscapular mm BL  9 to 9:15 AM  Patient noted improved mobility and reduced pain post-treatment    Treatment Plan:   The patient and I discussed the risks and benefits of chiropractic care. Based on the patient's subjective complaints along with the examination findings, it is advised that a course of chiropractic treatment be initiated. The patient provided consent for care. The patient tolerated today's treatment with little or no additional discomfort and was instructed to contact the office for questions or concerns. Will see patient once per week then every 2 weeks when symptoms become mild/manageable, further spaced apart contingent upon improvement.     This chart note was generated using dictation software, and as such, there may be typographical errors present. Abbreviations: Cervical spine (CS), cervical-thoracic (CT), Dry needling (DN), Flexion adduction internal rotation (FAIR), high velocity, low amplitude (HVLA), Lumbar spine (LS), Soft tissue manipulation (STM), spinal manipulative therapy (SMT), Straight leg raise (SLR), Thoracic  spine (TS).

## 2024-10-16 ENCOUNTER — TELEMEDICINE (OUTPATIENT)
Dept: NEUROLOGY | Facility: CLINIC | Age: 34
End: 2024-10-16
Payer: COMMERCIAL

## 2024-10-16 DIAGNOSIS — G43.009 MIGRAINE WITHOUT AURA AND WITHOUT STATUS MIGRAINOSUS, NOT INTRACTABLE: Primary | ICD-10-CM

## 2024-10-16 PROCEDURE — 99214 OFFICE O/P EST MOD 30 MIN: CPT | Performed by: NURSE PRACTITIONER

## 2024-10-16 PROCEDURE — 99214 OFFICE O/P EST MOD 30 MIN: CPT | Mod: 95 | Performed by: NURSE PRACTITIONER

## 2024-10-16 RX ORDER — METHYLPREDNISOLONE 4 MG/1
TABLET ORAL
Qty: 21 TABLET | Refills: 0 | Status: SHIPPED | OUTPATIENT
Start: 2024-10-16 | End: 2024-10-23

## 2024-10-16 NOTE — PROGRESS NOTES
Patient being assessed for follow-up after Botox.  She reports she has not noticed a significant improvement in her migraine activity.  Her chiropractor does report that he has noticed some relaxation of her trapezius.  This still is not affecting the frequency of her migraines.  She is still currently in a cycle and is on day 3 of the titrating Depakote.  Working to add on a Medrol Dosepak to see if we can completely get her out of the cycle.  Otherwise we will see her back for her next Botox treatment.  Discussed role of medicine, importance of taking medications, potential risks, benefits, and precautions to be taken.  Reviewed sleep hygiene and dietary modifications.

## 2024-10-17 ENCOUNTER — APPOINTMENT (OUTPATIENT)
Dept: INTEGRATIVE MEDICINE | Facility: CLINIC | Age: 34
End: 2024-10-17
Payer: COMMERCIAL

## 2024-10-17 DIAGNOSIS — M54.2 CHRONIC NECK PAIN: ICD-10-CM

## 2024-10-17 DIAGNOSIS — M99.03 SOMATIC DYSFUNCTION OF LUMBAR REGION: ICD-10-CM

## 2024-10-17 DIAGNOSIS — G89.29 CHRONIC NECK PAIN: ICD-10-CM

## 2024-10-17 DIAGNOSIS — R51.9 CHRONIC DAILY HEADACHE: ICD-10-CM

## 2024-10-17 DIAGNOSIS — M99.02 SOMATIC DYSFUNCTION OF THORACIC REGION: ICD-10-CM

## 2024-10-17 DIAGNOSIS — M79.10 MYALGIA: ICD-10-CM

## 2024-10-17 DIAGNOSIS — M99.01 SOMATIC DYSFUNCTION OF CERVICAL REGION: Primary | ICD-10-CM

## 2024-10-17 DIAGNOSIS — M79.7 FIBROMYALGIA: Primary | ICD-10-CM

## 2024-10-17 DIAGNOSIS — M99.04 SACROILIAC JOINT SOMATIC DYSFUNCTION: ICD-10-CM

## 2024-10-17 RX ORDER — KETOROLAC TROMETHAMINE 30 MG/ML
30 INJECTION, SOLUTION INTRAMUSCULAR; INTRAVENOUS ONCE
OUTPATIENT
Start: 2024-10-21 | End: 2024-10-21

## 2024-10-17 RX ORDER — KETAMINE HCL IN NACL, ISO-OSM 100MG/10ML
SYRINGE (ML) INJECTION ONCE
OUTPATIENT
Start: 2024-10-21

## 2024-10-22 ENCOUNTER — INFUSION (OUTPATIENT)
Dept: INFUSION THERAPY | Facility: CLINIC | Age: 34
End: 2024-10-22
Payer: COMMERCIAL

## 2024-10-22 VITALS
SYSTOLIC BLOOD PRESSURE: 132 MMHG | TEMPERATURE: 98.1 F | DIASTOLIC BLOOD PRESSURE: 77 MMHG | RESPIRATION RATE: 16 BRPM | OXYGEN SATURATION: 98 % | HEART RATE: 82 BPM

## 2024-10-22 DIAGNOSIS — M79.7 FIBROMYALGIA: ICD-10-CM

## 2024-10-22 LAB — PREGNANCY TEST URINE, POC: NEGATIVE

## 2024-10-22 PROCEDURE — 96375 TX/PRO/DX INJ NEW DRUG ADDON: CPT | Mod: INF

## 2024-10-22 PROCEDURE — 2500000004 HC RX 250 GENERAL PHARMACY W/ HCPCS (ALT 636 FOR OP/ED): Performed by: NURSE PRACTITIONER

## 2024-10-22 PROCEDURE — 96368 THER/DIAG CONCURRENT INF: CPT | Mod: INF

## 2024-10-22 PROCEDURE — 96365 THER/PROPH/DIAG IV INF INIT: CPT | Mod: INF

## 2024-10-22 PROCEDURE — 81025 URINE PREGNANCY TEST: CPT

## 2024-10-22 RX ORDER — NITROGLYCERIN 0.4 MG/1
0.4 TABLET SUBLINGUAL ONCE
OUTPATIENT
Start: 2024-10-31 | End: 2024-10-31

## 2024-10-22 RX ORDER — EPINEPHRINE 0.3 MG/.3ML
0.3 INJECTION SUBCUTANEOUS EVERY 5 MIN PRN
OUTPATIENT
Start: 2024-10-31

## 2024-10-22 RX ORDER — KETAMINE HCL IN NACL, ISO-OSM 100MG/10ML
SYRINGE (ML) INJECTION ONCE
Status: COMPLETED | OUTPATIENT
Start: 2024-10-22 | End: 2024-10-22

## 2024-10-22 RX ORDER — FAMOTIDINE 10 MG/ML
20 INJECTION INTRAVENOUS ONCE AS NEEDED
OUTPATIENT
Start: 2024-10-31

## 2024-10-22 RX ORDER — KETOROLAC TROMETHAMINE 30 MG/ML
30 INJECTION, SOLUTION INTRAMUSCULAR; INTRAVENOUS ONCE
Status: CANCELLED | OUTPATIENT
Start: 2024-10-31 | End: 2024-10-31

## 2024-10-22 RX ORDER — DIPHENHYDRAMINE HYDROCHLORIDE 50 MG/ML
25 INJECTION INTRAMUSCULAR; INTRAVENOUS ONCE
OUTPATIENT
Start: 2024-10-31 | End: 2024-10-31

## 2024-10-22 RX ORDER — METOPROLOL TARTRATE 25 MG/1
25 TABLET, FILM COATED ORAL ONCE
OUTPATIENT
Start: 2024-10-31 | End: 2024-10-31

## 2024-10-22 RX ORDER — ONDANSETRON HYDROCHLORIDE 2 MG/ML
4 INJECTION, SOLUTION INTRAVENOUS ONCE
OUTPATIENT
Start: 2024-10-31 | End: 2024-10-31

## 2024-10-22 RX ORDER — KETAMINE HCL IN NACL, ISO-OSM 100MG/10ML
SYRINGE (ML) INJECTION ONCE
Status: CANCELLED | OUTPATIENT
Start: 2024-10-31

## 2024-10-22 RX ORDER — METOCLOPRAMIDE HYDROCHLORIDE 5 MG/ML
10 INJECTION INTRAMUSCULAR; INTRAVENOUS ONCE
OUTPATIENT
Start: 2024-10-31 | End: 2024-10-31

## 2024-10-22 RX ORDER — KETOROLAC TROMETHAMINE 30 MG/ML
30 INJECTION, SOLUTION INTRAMUSCULAR; INTRAVENOUS ONCE
Status: COMPLETED | OUTPATIENT
Start: 2024-10-22 | End: 2024-10-22

## 2024-10-22 RX ORDER — DIPHENHYDRAMINE HYDROCHLORIDE 50 MG/ML
50 INJECTION INTRAMUSCULAR; INTRAVENOUS AS NEEDED
OUTPATIENT
Start: 2024-10-31

## 2024-10-22 RX ORDER — ALBUTEROL SULFATE 0.83 MG/ML
3 SOLUTION RESPIRATORY (INHALATION) AS NEEDED
OUTPATIENT
Start: 2024-10-31

## 2024-10-22 ASSESSMENT — PAIN SCALES - GENERAL
PAINLEVEL_OUTOF10: 8
PAINLEVEL_OUTOF10: 3
PAINLEVEL_OUTOF10: 8

## 2024-10-22 ASSESSMENT — ENCOUNTER SYMPTOMS
OCCASIONAL FEELINGS OF UNSTEADINESS: 0
LOSS OF SENSATION IN FEET: 1
DEPRESSION: 0

## 2024-10-22 ASSESSMENT — PAIN DESCRIPTION - LOCATION: LOCATION: GENERALIZED

## 2024-10-22 NOTE — PATIENT INSTRUCTIONS
Today :We administered ketamine 30 mg-lidocaine 300 mg, propofol, and ketorolac.     For:   1. Fibromyalgia          (Tell all doctors including dentists that you are taking this medication)     Go to the emergency room or call 911 if:  -You have signs of allergic reaction:   -Rash, hives, itching.   -Swollen, blistered, peeling skin.   -Swelling of face, lips, mouth, tongue or throat.   -Tightness of chest, trouble breathing, swallowing or talking     Call your doctor:  - If IV / injection site gets red, warm, swollen, itchy or leaks fluid or pus.     (Leave dressing on your IV site for at least 2 hours and keep area clean and dry  - If you get sick or have symptoms of infection or are not feeling well for any reason.    (Wash your hands often, stay away from people who are sick)  - If you have side effects from your medication that do not go away or are bothersome.     (Refer to the teaching your nurse gave you for side effects to call your doctor about)    - Common side effects may include:  stuffy nose, headache, feeling tired, muscle aches, upset stomach  - Before receiving any vaccines     - Call [facility] at   If:  - You get sick, are on antibiotics, have had a recent vaccine, have surgery or dental work and your doctor wants your visit rescheduled.  - You need to cancel and reschedule your visit for any reason. Call at least 2 days before your visit if you need to cancel.   - Your insurance changes before your next visit.    (We will need to get approval from your new insurance. This can take up to two weeks.)     [facility] is opened Monday thru Friday. We are closed on weekends and holidays.   Voice mail will take your call if the center is closed. If you leave a message please allow 24 hours for a call back during weekdays. If you leave a message on a weekend/holiday, we will call you back the next business day.              [facility] OUTPATIENT [place]      Pain Infusion  Aftercare Instructions      1. It is normal to feel sedated, tired and low in energy after a pain infusion. DO NOT DRIVE, OPERATE ANY MACHINERY, OR MAKE ANY IMPORTANT DECISIONS FOR AT LEAST 24 HOURS AFTER THE INFUSION.     2. Call the pain center at [phone] with any problems, questions, or concerns.     3. Eat light after the infusion. If you feel queasy or sick to your stomach, laying down with your eyes closed may help. When you resume eating start with something mild like clear liquids, yogurt, applesauce, crackers, etc… Gradually advance to a regular diet.     4. Do not leave your house alone the evening of your pain infusion.     5. No alcohol or sedative medications, such as sleeping pills, for 24 hours after your pain infusion.     6. Resume all other prescribed medications unless directed otherwise by you physician.     7. If you have any medical emergencies, call 911 or go directly to the closest emergency room.

## 2024-10-22 NOTE — PROGRESS NOTES
S: Patient here for 23rd opioid sparing pain infusion. Patient reports 50% reduction in pain after last infusion that lasted 1.5 weeks.    Purpose of pain infusion meds explained along with potential side effects.  Patient verbalized understanding.    B: Pain Issues.: chronic generalized pain 8/10, including head, neck, back and left arm Is Patient breast feeding: ? no    A: Patient currently has pain described on flow sheet documentation. Designated  is Adrian Connor. Patient last ate solid food 12 hours ago, and had liquid 1 hours ago.    R: Plan; Obtain IV access, do patient risk assessment, and start opioid sparing infusion as ordered. Monitoring for S/S of adverse reactions.    1012Post infusion teaching provided. Patient verbalized understanding. VSS, Patient states pain is 3/10. Will assist patient to waiting car via wheelchair.

## 2024-10-23 ENCOUNTER — APPOINTMENT (OUTPATIENT)
Dept: PHYSICAL THERAPY | Facility: CLINIC | Age: 34
End: 2024-10-23
Payer: COMMERCIAL

## 2024-10-23 ASSESSMENT — ENCOUNTER SYMPTOMS
VOMITING: 0
FATIGUE: 1
DIZZINESS: 0
AGITATION: 0
SHORTNESS OF BREATH: 0
DIFFICULTY URINATING: 0
DYSURIA: 0
DIARRHEA: 0
COLOR CHANGE: 0
FEVER: 0
NAUSEA: 0

## 2024-10-24 ENCOUNTER — APPOINTMENT (OUTPATIENT)
Dept: INTEGRATIVE MEDICINE | Facility: CLINIC | Age: 34
End: 2024-10-24
Payer: COMMERCIAL

## 2024-10-24 DIAGNOSIS — M54.2 NECK PAIN: ICD-10-CM

## 2024-10-24 DIAGNOSIS — R51.9 CHRONIC DAILY HEADACHE: ICD-10-CM

## 2024-10-24 DIAGNOSIS — M79.10 MYALGIA: ICD-10-CM

## 2024-10-24 DIAGNOSIS — G89.29 CHRONIC LOW BACK PAIN WITH SCIATICA, SCIATICA LATERALITY UNSPECIFIED, UNSPECIFIED BACK PAIN LATERALITY: ICD-10-CM

## 2024-10-24 DIAGNOSIS — M99.04 SACROILIAC JOINT SOMATIC DYSFUNCTION: ICD-10-CM

## 2024-10-24 DIAGNOSIS — M54.40 CHRONIC LOW BACK PAIN WITH SCIATICA, SCIATICA LATERALITY UNSPECIFIED, UNSPECIFIED BACK PAIN LATERALITY: ICD-10-CM

## 2024-10-24 DIAGNOSIS — M99.02 SOMATIC DYSFUNCTION OF THORACIC REGION: ICD-10-CM

## 2024-10-24 DIAGNOSIS — G43.001 MIGRAINE WITHOUT AURA AND WITH STATUS MIGRAINOSUS, NOT INTRACTABLE: ICD-10-CM

## 2024-10-24 DIAGNOSIS — M99.03 SOMATIC DYSFUNCTION OF LUMBAR REGION: Primary | ICD-10-CM

## 2024-10-24 DIAGNOSIS — R51.9 WORSENING HEADACHES: Primary | ICD-10-CM

## 2024-10-24 RX ORDER — DIVALPROEX SODIUM 250 MG/1
TABLET, DELAYED RELEASE ORAL
Qty: 16 TABLET | Refills: 0 | Status: SHIPPED | OUTPATIENT
Start: 2024-10-24 | End: 2024-10-30

## 2024-10-24 RX ORDER — KETOROLAC TROMETHAMINE 10 MG/1
10 TABLET, FILM COATED ORAL EVERY 6 HOURS PRN
Qty: 20 TABLET | Refills: 0 | Status: SHIPPED | OUTPATIENT
Start: 2024-10-24 | End: 2024-10-29

## 2024-10-24 NOTE — PROGRESS NOTES
Assessment/Plan   Today's presentation is consistent with fibromyalgia/myofascial pain syndrome alongside postural strain and somatic dysfunction contributing to her pain syndrome, also has been diagnosed with inflammatory arthritis (not rheumatoid), PCOS, and has had elevated ESR/CRP and been under rheumatologic care.  Complicating factors include chronicity of symptoms as well as body habitus.  We will implement a trial of care to include various manipulative techniques which will range from instrument assisted to diversified.  We will utilize soft tissue techniques such as active release technique to the cervical spine musculature.  We will closely monitor their response to care to determine if any changes need to occur, if advanced imaging is needed, or if referral to other providers is appropriate. She will also be receiving acupuncture and integrative medicine consultation which is appropriate when considering her overall presentation     Full spine EOS radiographs 2024 - 12 degree dextroconvex scoliosis from T11 to L3. Hyperkyphosis in TS and hyperlordosis in LS    LS radiographs 2024 - IMPRESSION:  No acute pathologic findings are identified.    CS radiographs 2024 - IMPRESSION:  No pathologic findings are identified.    TS radiographs 2024 - IMPRESSION:  No acute pathologic findings are identified.  Thoracic lumbar dextroscoliosis.  Lower thoracic mild spondylosis.    Visits this year: 13 (under new insurance)    Subjective   Patient reports a current 5/10 NRS headache located in the frontal region with mild neck pain. She received an infusion yesterday, which relieved the headache for the rest of the day, with gradual return of symptoms today. She has been following up with functional medicine still.     HPI - 10/05/23 (CR): the patient presents today per the referral of Dr. Suárez and pain management for evaluation and and management of chronic full spine complaints. She has in the past been diagnosed  with fibromyalgia. She has dealt with pain for several years and has received chiropractic care in the past. She did have mixed results with chiropractic care in the past, reporting a variation of instrument assisted manipulation and manual manipulation. But she wished to attend chiropractic care again through a hospital-based provider. She is under the care of pain management and will be starting infusions next week. She is also on the wait list for infusions at [facility] if needed. Overall, her pain levels remain moderate to severe. Her pain is constant. Symptoms in the lower back and hips seem to be the area of most intensity. But she does continue to experience pain throughout the spine. She experiences paresthesias in the upper and lower extremities bilaterally. She has difficulty finding any comfortable positions     Review of Systems   Constitutional:  Positive for fatigue. Negative for fever.   Eyes:  Negative for visual disturbance.   Respiratory:  Negative for shortness of breath.    Cardiovascular:  Negative for chest pain.   Gastrointestinal:  Negative for diarrhea, nausea and vomiting.   Genitourinary:  Negative for difficulty urinating and dysuria.   Skin:  Negative for color change.   Neurological:  Negative for dizziness.   Psychiatric/Behavioral:  Negative for agitation.    All other systems reviewed and are negative.    Objective   Examination findings (e.g., palpation & ROM): Decreased C/S and L/S ROM with pain, hypertonic and tender cervical and lumbar erectors, BL upper trapezius    Segmental joint dysfunction was identified in the following areas using motion palpation and/or pain provocation assessment:  Cervical: 2  Thoracic: 5-7  Lumbopelvic: 1-3, BL SIJ      Physical Exam  Neurological:      General: No focal deficit present.      Mental Status: She is alert.      Motor: Motor function is intact.      Coordination: Coordination is intact.      Gait: Gait is intact.      Deep Tendon  Reflexes: Reflexes are normal and symmetric.      Reflex Scores:       Tricep reflexes are 2+ on the right side and 2+ on the left side.       Bicep reflexes are 2+ on the right side and 2+ on the left side.       Brachioradialis reflexes are 2+ on the right side and 2+ on the left side.       Patellar reflexes are 2+ on the right side and 2+ on the left side.       Achilles reflexes are 2+ on the right side and 2+ on the left side.     Comments: Trace Jaylin's BL  9/3/24       Plan   Today's treatment:  SMT to regions of segmental dysfunction identified on exam, using age-appropriate force, and manual diversified technique.   Mobilization on CS  STM to patient tolerance to hypertonic paraspinal muscles, upper trapezius  Manual dynamic stretching of the periscapular mm BL  9:40 to 9:59 AM  Patient noted improved mobility and reduced pain post-treatment    Treatment Plan:   The patient and I discussed the risks and benefits of chiropractic care. Based on the patient's subjective complaints along with the examination findings, it is advised that a course of chiropractic treatment be initiated. The patient provided consent for care. The patient tolerated today's treatment with little or no additional discomfort and was instructed to contact the office for questions or concerns. Will see patient once per week then every 2 weeks when symptoms become mild/manageable, further spaced apart contingent upon improvement.     This chart note was generated using dictation software, and as such, there may be typographical errors present. Abbreviations: Cervical spine (CS), cervical-thoracic (CT), Dry needling (DN), Flexion adduction internal rotation (FAIR), high velocity, low amplitude (HVLA), Lumbar spine (LS), Soft tissue manipulation (STM), spinal manipulative therapy (SMT), Straight leg raise (SLR), Thoracic spine (TS).

## 2024-10-25 ENCOUNTER — TELEPHONE (OUTPATIENT)
Dept: INFUSION THERAPY | Facility: CLINIC | Age: 34
End: 2024-10-25
Payer: COMMERCIAL

## 2024-10-29 ENCOUNTER — APPOINTMENT (OUTPATIENT)
Dept: INTEGRATIVE MEDICINE | Facility: CLINIC | Age: 34
End: 2024-10-29
Payer: COMMERCIAL

## 2024-10-29 DIAGNOSIS — K59.00 CONSTIPATION, UNSPECIFIED CONSTIPATION TYPE: Primary | ICD-10-CM

## 2024-10-29 RX ORDER — SENNOSIDES 8.6 MG/1
1 TABLET ORAL DAILY
Qty: 90 TABLET | Refills: 3 | Status: SHIPPED | OUTPATIENT
Start: 2024-10-29 | End: 2025-10-29

## 2024-10-30 ENCOUNTER — APPOINTMENT (OUTPATIENT)
Dept: PHARMACY | Facility: HOSPITAL | Age: 34
End: 2024-10-30
Payer: COMMERCIAL

## 2024-10-30 ENCOUNTER — LAB (OUTPATIENT)
Dept: LAB | Facility: LAB | Age: 34
End: 2024-10-30
Payer: COMMERCIAL

## 2024-10-30 DIAGNOSIS — M79.7 FIBROMYALGIA: Primary | ICD-10-CM

## 2024-10-30 DIAGNOSIS — E66.9 OBESITY: ICD-10-CM

## 2024-10-30 DIAGNOSIS — E28.2 POLYCYSTIC OVARIES: ICD-10-CM

## 2024-10-30 LAB
INSULIN P FAST SERPL-ACNC: 14 UIU/ML (ref 3–25)
THYROPEROXIDASE AB SERPL-ACNC: 30 IU/ML

## 2024-10-30 PROCEDURE — 36415 COLL VENOUS BLD VENIPUNCTURE: CPT

## 2024-10-30 PROCEDURE — 83525 ASSAY OF INSULIN: CPT

## 2024-10-30 PROCEDURE — 86376 MICROSOMAL ANTIBODY EACH: CPT

## 2024-10-30 RX ORDER — KETOROLAC TROMETHAMINE 30 MG/ML
30 INJECTION, SOLUTION INTRAMUSCULAR; INTRAVENOUS ONCE
OUTPATIENT
Start: 2024-11-05 | End: 2024-11-05

## 2024-10-30 RX ORDER — KETAMINE HCL IN NACL, ISO-OSM 100MG/10ML
SYRINGE (ML) INJECTION ONCE
OUTPATIENT
Start: 2024-11-05

## 2024-10-30 ASSESSMENT — ENCOUNTER SYMPTOMS
DIFFICULTY URINATING: 0
COLOR CHANGE: 0
FEVER: 0
NAUSEA: 0
DYSURIA: 0
FATIGUE: 1
SHORTNESS OF BREATH: 0
AGITATION: 0
VOMITING: 0
DIARRHEA: 0
DIZZINESS: 0

## 2024-10-31 ENCOUNTER — ALLIED HEALTH (OUTPATIENT)
Dept: INTEGRATIVE MEDICINE | Facility: CLINIC | Age: 34
End: 2024-10-31
Payer: COMMERCIAL

## 2024-10-31 ENCOUNTER — APPOINTMENT (OUTPATIENT)
Dept: PHARMACY | Facility: HOSPITAL | Age: 34
End: 2024-10-31
Payer: COMMERCIAL

## 2024-10-31 DIAGNOSIS — E88.819 INSULIN RESISTANCE: ICD-10-CM

## 2024-10-31 DIAGNOSIS — R51.9 CHRONIC DAILY HEADACHE: ICD-10-CM

## 2024-10-31 DIAGNOSIS — G89.29 CHRONIC NECK PAIN: ICD-10-CM

## 2024-10-31 DIAGNOSIS — G89.29 CHRONIC LOW BACK PAIN WITH SCIATICA, SCIATICA LATERALITY UNSPECIFIED, UNSPECIFIED BACK PAIN LATERALITY: ICD-10-CM

## 2024-10-31 DIAGNOSIS — M79.10 MYALGIA: ICD-10-CM

## 2024-10-31 DIAGNOSIS — M99.02 SOMATIC DYSFUNCTION OF THORACIC REGION: ICD-10-CM

## 2024-10-31 DIAGNOSIS — M54.2 NECK PAIN: ICD-10-CM

## 2024-10-31 DIAGNOSIS — M99.04 SACROILIAC JOINT SOMATIC DYSFUNCTION: ICD-10-CM

## 2024-10-31 DIAGNOSIS — M54.40 CHRONIC LOW BACK PAIN WITH SCIATICA, SCIATICA LATERALITY UNSPECIFIED, UNSPECIFIED BACK PAIN LATERALITY: ICD-10-CM

## 2024-10-31 DIAGNOSIS — M99.03 SOMATIC DYSFUNCTION OF LUMBAR REGION: Primary | ICD-10-CM

## 2024-10-31 DIAGNOSIS — Z91.89 AT RISK FOR NAUSEA: ICD-10-CM

## 2024-10-31 DIAGNOSIS — M54.2 CHRONIC NECK PAIN: ICD-10-CM

## 2024-10-31 PROCEDURE — 97140 MANUAL THERAPY 1/> REGIONS: CPT | Performed by: CHIROPRACTOR

## 2024-10-31 PROCEDURE — 98941 CHIROPRACT MANJ 3-4 REGIONS: CPT | Performed by: CHIROPRACTOR

## 2024-11-01 RX ORDER — ONDANSETRON 4 MG/1
8 TABLET, FILM COATED ORAL EVERY 8 HOURS PRN
Qty: 20 TABLET | Refills: 1 | Status: SHIPPED | OUTPATIENT
Start: 2024-11-01

## 2024-11-05 ENCOUNTER — DOCUMENTATION (OUTPATIENT)
Dept: ENDOCRINOLOGY | Facility: HOSPITAL | Age: 34
End: 2024-11-05

## 2024-11-05 ENCOUNTER — INFUSION (OUTPATIENT)
Dept: INFUSION THERAPY | Facility: CLINIC | Age: 34
End: 2024-11-05
Payer: COMMERCIAL

## 2024-11-05 VITALS
SYSTOLIC BLOOD PRESSURE: 120 MMHG | OXYGEN SATURATION: 97 % | RESPIRATION RATE: 17 BRPM | DIASTOLIC BLOOD PRESSURE: 63 MMHG | WEIGHT: 270 LBS | HEART RATE: 79 BPM | HEIGHT: 61 IN | BODY MASS INDEX: 50.98 KG/M2 | TEMPERATURE: 97.3 F

## 2024-11-05 DIAGNOSIS — M79.7 FIBROMYALGIA: ICD-10-CM

## 2024-11-05 DIAGNOSIS — Z00.00 ANNUAL PHYSICAL EXAM: Primary | ICD-10-CM

## 2024-11-05 LAB — PREGNANCY TEST URINE, POC: NEGATIVE

## 2024-11-05 PROCEDURE — 96365 THER/PROPH/DIAG IV INF INIT: CPT | Mod: INF

## 2024-11-05 PROCEDURE — 96375 TX/PRO/DX INJ NEW DRUG ADDON: CPT | Mod: INF

## 2024-11-05 PROCEDURE — 81025 URINE PREGNANCY TEST: CPT

## 2024-11-05 PROCEDURE — 96368 THER/DIAG CONCURRENT INF: CPT | Mod: INF

## 2024-11-05 PROCEDURE — 2500000004 HC RX 250 GENERAL PHARMACY W/ HCPCS (ALT 636 FOR OP/ED): Performed by: NURSE PRACTITIONER

## 2024-11-05 RX ORDER — METOCLOPRAMIDE HYDROCHLORIDE 5 MG/ML
10 INJECTION INTRAMUSCULAR; INTRAVENOUS ONCE
OUTPATIENT
Start: 2024-11-19 | End: 2024-11-19

## 2024-11-05 RX ORDER — FAMOTIDINE 10 MG/ML
20 INJECTION INTRAVENOUS ONCE AS NEEDED
OUTPATIENT
Start: 2024-11-19

## 2024-11-05 RX ORDER — KETOROLAC TROMETHAMINE 30 MG/ML
30 INJECTION, SOLUTION INTRAMUSCULAR; INTRAVENOUS ONCE
Status: COMPLETED | OUTPATIENT
Start: 2024-11-05 | End: 2024-11-05

## 2024-11-05 RX ORDER — NITROGLYCERIN 0.4 MG/1
0.4 TABLET SUBLINGUAL ONCE
OUTPATIENT
Start: 2024-11-19 | End: 2024-11-19

## 2024-11-05 RX ORDER — DIPHENHYDRAMINE HYDROCHLORIDE 50 MG/ML
50 INJECTION INTRAMUSCULAR; INTRAVENOUS AS NEEDED
OUTPATIENT
Start: 2024-11-19

## 2024-11-05 RX ORDER — DIPHENHYDRAMINE HYDROCHLORIDE 50 MG/ML
25 INJECTION INTRAMUSCULAR; INTRAVENOUS ONCE
OUTPATIENT
Start: 2024-11-19 | End: 2024-11-19

## 2024-11-05 RX ORDER — KETAMINE HCL IN NACL, ISO-OSM 100MG/10ML
SYRINGE (ML) INJECTION ONCE
OUTPATIENT
Start: 2024-11-19

## 2024-11-05 RX ORDER — METOPROLOL TARTRATE 25 MG/1
25 TABLET, FILM COATED ORAL ONCE
OUTPATIENT
Start: 2024-11-19 | End: 2024-11-19

## 2024-11-05 RX ORDER — ONDANSETRON HYDROCHLORIDE 2 MG/ML
4 INJECTION, SOLUTION INTRAVENOUS ONCE
OUTPATIENT
Start: 2024-11-19 | End: 2024-11-19

## 2024-11-05 RX ORDER — EPINEPHRINE 0.3 MG/.3ML
0.3 INJECTION SUBCUTANEOUS EVERY 5 MIN PRN
OUTPATIENT
Start: 2024-11-19

## 2024-11-05 RX ORDER — ALBUTEROL SULFATE 0.83 MG/ML
3 SOLUTION RESPIRATORY (INHALATION) AS NEEDED
OUTPATIENT
Start: 2024-11-19

## 2024-11-05 RX ORDER — KETAMINE HCL IN NACL, ISO-OSM 100MG/10ML
SYRINGE (ML) INJECTION ONCE
Status: COMPLETED | OUTPATIENT
Start: 2024-11-05 | End: 2024-11-05

## 2024-11-05 RX ORDER — KETOROLAC TROMETHAMINE 30 MG/ML
30 INJECTION, SOLUTION INTRAMUSCULAR; INTRAVENOUS ONCE
OUTPATIENT
Start: 2024-11-19 | End: 2024-11-19

## 2024-11-05 SDOH — ECONOMIC STABILITY: FOOD INSECURITY: WITHIN THE PAST 12 MONTHS, YOU WORRIED THAT YOUR FOOD WOULD RUN OUT BEFORE YOU GOT MONEY TO BUY MORE.: NEVER TRUE

## 2024-11-05 SDOH — ECONOMIC STABILITY: FOOD INSECURITY: WITHIN THE PAST 12 MONTHS, THE FOOD YOU BOUGHT JUST DIDN'T LAST AND YOU DIDN'T HAVE MONEY TO GET MORE.: NEVER TRUE

## 2024-11-05 ASSESSMENT — ENCOUNTER SYMPTOMS
OCCASIONAL FEELINGS OF UNSTEADINESS: 0
LOSS OF SENSATION IN FEET: 1
DEPRESSION: 0

## 2024-11-05 ASSESSMENT — PAIN SCALES - GENERAL
PAINLEVEL_OUTOF10: 7
PAINLEVEL_OUTOF10: 2
PAINLEVEL_OUTOF10: 7

## 2024-11-05 ASSESSMENT — LIFESTYLE VARIABLES
HOW OFTEN DO YOU HAVE SIX OR MORE DRINKS ON ONE OCCASION: NEVER
HOW OFTEN DO YOU HAVE A DRINK CONTAINING ALCOHOL: NEVER
AUDIT-C TOTAL SCORE: 0
HOW MANY STANDARD DRINKS CONTAINING ALCOHOL DO YOU HAVE ON A TYPICAL DAY: PATIENT DOES NOT DRINK
SKIP TO QUESTIONS 9-10: 1

## 2024-11-05 ASSESSMENT — PAIN - FUNCTIONAL ASSESSMENT: PAIN_FUNCTIONAL_ASSESSMENT: 0-10

## 2024-11-05 NOTE — PROGRESS NOTES
S: Patient here for #24 opioid sparing pain infusion. Patient reports 35-40% reduction in pain after last infusion that lasted 1.5 weeks.    Purpose of pain infusion meds explained along with potential side effects.  Patient verbalized understanding.    B: Pain Issue 7/10 back, neck, head . Is Patient breast feeding: No    A: Patient currently has pain described on flow sheet documentation. Designated  is simeon Connor [phone]. Patient last ate solid food 12 hours ago, and had liquid .5 hours ago.    R: Plan; Obtain IV access, do patient risk assessment, and start opioid sparing infusion as ordered. Monitoring for S/S of adverse reactions.    Urine Preg Neg  Patient resting up in chair, no distress noted. Warm blanket & pillow applied.    Patient tolerated ordered rate  Post infusion teaching provided. Patient verbalized understanding. VSS, Patient states pain is 2. Will assist patient to waiting car via wheelchair.

## 2024-11-05 NOTE — PATIENT INSTRUCTIONS
Today :We administered ketamine 30 mg-lidocaine 300 mg, propofol, and ketorolac.     For:   1. Fibromyalgia         Your next appointment is due in:  11/21/24  [facility]      Pain Infusion Aftercare Instructions      1. It is normal to feel sedated, tired and low in energy after a pain infusion. DO NOT DRIVE, OPERATE ANY MACHINERY, OR MAKE ANY IMPORTANT DECISIONS FOR AT LEAST 24 HOURS AFTER THE INFUSION.     2. Call the pain center at [phone] with any problems, questions, or concerns.     3. Eat light after the infusion. If you feel queasy or sick to your stomach, laying down with your eyes closed may help. When you resume eating start with something mild like clear liquids, yogurt, applesauce, crackers, etc… Gradually advance to a regular diet.     4. Do not leave your house alone the evening of your pain infusion.     5. No alcohol or sedative medications, such as sleeping pills, for 24 hours after your pain infusion.     6. Resume all other prescribed medications unless directed otherwise by you physician.     7. If you have any medical emergencies, call 911 or go directly to the closest emergency room.        Please read the  Medication Guide that was given to you and reviewed during todays visit.     (Tell all doctors including dentists that you are taking this medication)     Go to the emergency room or call 911 if:  -You have signs of allergic reaction:   -Rash, hives, itching.   -Swollen, blistered, peeling skin.   -Swelling of face, lips, mouth, tongue or throat.   -Tightness of chest, trouble breathing, swallowing or talking     Call your doctor:  - If IV / injection site gets red, warm, swollen, itchy or leaks fluid or pus.     (Leave dressing on your IV site for at least 2 hours and keep area clean and dry  - If you get sick or have symptoms of infection or are not feeling well for any reason.    (Wash your hands often, stay away from people who are sick)  - If you have  side effects from your medication that do not go away or are bothersome.     (Refer to the teaching your nurse gave you for side effects to call your doctor about)    - Common side effects may include:  stuffy nose, headache, feeling tired, muscle aches, upset stomach  - Before receiving any vaccines     - Call [facility] at   If:  - You get sick, are on antibiotics, have had a recent vaccine, have surgery or dental work and your doctor wants your visit rescheduled.  - You need to cancel and reschedule your visit for any reason. Call at least 2 days before your visit if you need to cancel.   - Your insurance changes before your next visit.    (We will need to get approval from your new insurance. This can take up to two weeks.)     [facility] is opened Monday thru Friday. We are closed on weekends and holidays.   Voice mail will take your call if the center is closed. If you leave a message please allow 24 hours for a call back during weekdays. If you leave a message on a weekend/holiday, we will call you back the next business day.    A pharmacist is available Monday - Friday from 8:30AM to 3:30PM to help answer any questions you may have about your prescriptions(s). Please call pharmacy at:    [facility]: [phone]  [facility]: [phone]  [facility]: [phone]

## 2024-11-06 ASSESSMENT — ENCOUNTER SYMPTOMS
FATIGUE: 1
VOMITING: 0
DYSURIA: 0
DIARRHEA: 0
COLOR CHANGE: 0
SHORTNESS OF BREATH: 0
FEVER: 0
NAUSEA: 0
DIZZINESS: 0
DIFFICULTY URINATING: 0
AGITATION: 0

## 2024-11-06 NOTE — PROGRESS NOTES
Assessment/Plan   Today's presentation is consistent with fibromyalgia/myofascial pain syndrome alongside postural strain and somatic dysfunction contributing to her pain syndrome, also has been diagnosed with inflammatory arthritis (not rheumatoid), PCOS, and has had elevated ESR/CRP and been under rheumatologic care.  Complicating factors include chronicity of symptoms as well as body habitus.  We will implement a trial of care to include various manipulative techniques which will range from instrument assisted to diversified.  We will utilize soft tissue techniques such as active release technique to the cervical spine musculature.  We will closely monitor their response to care to determine if any changes need to occur, if advanced imaging is needed, or if referral to other providers is appropriate. She will also be receiving acupuncture and integrative medicine consultation which is appropriate when considering her overall presentation     Full spine EOS radiographs 2024 - 12 degree dextroconvex scoliosis from T11 to L3. Hyperkyphosis in TS and hyperlordosis in LS    LS radiographs 2024 - IMPRESSION:  No acute pathologic findings are identified.    CS radiographs 2024 - IMPRESSION:  No pathologic findings are identified.    TS radiographs 2024 - IMPRESSION:  No acute pathologic findings are identified.  Thoracic lumbar dextroscoliosis.  Lower thoracic mild spondylosis.    Visits this year: 15 (under new insurance)    Subjective   Patient reports moderate to severe intensity thoracolumbar pain with radiation around the ribcage BL, L>R. This pain is most prominent when she wakes up in the morning. She endorses frequent headaches and endorses a current headache of 4-5/10 intensity. No radiating symptoms in legs.    HPI - 10/05/23 (CR): the patient presents today per the referral of Dr. Suárez and pain management for evaluation and and management of chronic full spine complaints. She has in the past been diagnosed  with fibromyalgia. She has dealt with pain for several years and has received chiropractic care in the past. She did have mixed results with chiropractic care in the past, reporting a variation of instrument assisted manipulation and manual manipulation. But she wished to attend chiropractic care again through a hospital-based provider. She is under the care of pain management and will be starting infusions next week. She is also on the wait list for infusions at [facility] if needed. Overall, her pain levels remain moderate to severe. Her pain is constant. Symptoms in the lower back and hips seem to be the area of most intensity. But she does continue to experience pain throughout the spine. She experiences paresthesias in the upper and lower extremities bilaterally. She has difficulty finding any comfortable positions     Review of Systems   Constitutional:  Positive for fatigue. Negative for fever.   Eyes:  Negative for visual disturbance.   Respiratory:  Negative for shortness of breath.    Cardiovascular:  Negative for chest pain.   Gastrointestinal:  Negative for diarrhea, nausea and vomiting.   Genitourinary:  Negative for difficulty urinating and dysuria.   Skin:  Negative for color change.   Neurological:  Negative for dizziness.   Psychiatric/Behavioral:  Negative for agitation.    All other systems reviewed and are negative.    Objective   Examination findings (e.g., palpation & ROM): Decreased C/S and L/S ROM with pain, hypertonic and tender cervical and lumbar erectors, BL upper trapezius    Segmental joint dysfunction was identified in the following areas using motion palpation and/or pain provocation assessment:  Cervical: 2  Thoracic: 5-7  Lumbopelvic: 1-3, BL SIJ      Physical Exam  Neurological:      General: No focal deficit present.      Mental Status: She is alert.      Motor: Motor function is intact.      Coordination: Coordination is intact.      Gait: Gait is intact.      Deep Tendon  Reflexes: Reflexes are normal and symmetric.      Reflex Scores:       Tricep reflexes are 2+ on the right side and 2+ on the left side.       Bicep reflexes are 2+ on the right side and 2+ on the left side.       Brachioradialis reflexes are 2+ on the right side and 2+ on the left side.       Patellar reflexes are 2+ on the right side and 2+ on the left side.       Achilles reflexes are 2+ on the right side and 2+ on the left side.     Comments: Trace Jaylin's BL  9/3/24       Plan   Today's treatment:  SMT to regions of segmental dysfunction identified on exam, using age-appropriate force, and manual diversified technique.   Mobilization on CS  STM to patient tolerance to hypertonic paraspinal muscles, upper trapezius  Manual dynamic stretching of the periscapular mm BL  9:20 to 9:38 AM  Patient noted improved mobility and reduced pain post-treatment    Treatment Plan:   The patient and I discussed the risks and benefits of chiropractic care. Based on the patient's subjective complaints along with the examination findings, it is advised that a course of chiropractic treatment be initiated. The patient provided consent for care. The patient tolerated today's treatment with little or no additional discomfort and was instructed to contact the office for questions or concerns. Will see patient once per week then every 2 weeks when symptoms become mild/manageable, further spaced apart contingent upon improvement.     This chart note was generated using dictation software, and as such, there may be typographical errors present. Abbreviations: Cervical spine (CS), cervical-thoracic (CT), Dry needling (DN), Flexion adduction internal rotation (FAIR), high velocity, low amplitude (HVLA), Lumbar spine (LS), Soft tissue manipulation (STM), spinal manipulative therapy (SMT), Straight leg raise (SLR), Thoracic spine (TS).

## 2024-11-07 ENCOUNTER — APPOINTMENT (OUTPATIENT)
Dept: INTEGRATIVE MEDICINE | Facility: CLINIC | Age: 34
End: 2024-11-07
Payer: COMMERCIAL

## 2024-11-07 ENCOUNTER — LAB (OUTPATIENT)
Dept: LAB | Facility: LAB | Age: 34
End: 2024-11-07
Payer: COMMERCIAL

## 2024-11-07 DIAGNOSIS — M54.2 NECK PAIN: ICD-10-CM

## 2024-11-07 DIAGNOSIS — M79.10 MYALGIA: ICD-10-CM

## 2024-11-07 DIAGNOSIS — R51.9 CHRONIC DAILY HEADACHE: ICD-10-CM

## 2024-11-07 DIAGNOSIS — M54.40 CHRONIC LOW BACK PAIN WITH SCIATICA, SCIATICA LATERALITY UNSPECIFIED, UNSPECIFIED BACK PAIN LATERALITY: ICD-10-CM

## 2024-11-07 DIAGNOSIS — M99.02 SOMATIC DYSFUNCTION OF THORACIC REGION: ICD-10-CM

## 2024-11-07 DIAGNOSIS — M99.04 SACROILIAC JOINT SOMATIC DYSFUNCTION: ICD-10-CM

## 2024-11-07 DIAGNOSIS — M99.03 SOMATIC DYSFUNCTION OF LUMBAR REGION: Primary | ICD-10-CM

## 2024-11-07 DIAGNOSIS — M99.01 SOMATIC DYSFUNCTION OF CERVICAL REGION: ICD-10-CM

## 2024-11-07 DIAGNOSIS — Z00.00 ANNUAL PHYSICAL EXAM: ICD-10-CM

## 2024-11-07 DIAGNOSIS — G89.29 CHRONIC LOW BACK PAIN WITH SCIATICA, SCIATICA LATERALITY UNSPECIFIED, UNSPECIFIED BACK PAIN LATERALITY: ICD-10-CM

## 2024-11-07 LAB
ALBUMIN SERPL BCP-MCNC: 4 G/DL (ref 3.4–5)
ALP SERPL-CCNC: 77 U/L (ref 33–110)
ALT SERPL W P-5'-P-CCNC: 16 U/L (ref 7–45)
ANION GAP SERPL CALC-SCNC: 10 MMOL/L (ref 10–20)
AST SERPL W P-5'-P-CCNC: 12 U/L (ref 9–39)
BILIRUB SERPL-MCNC: 0.4 MG/DL (ref 0–1.2)
BUN SERPL-MCNC: 11 MG/DL (ref 6–23)
CALCIUM SERPL-MCNC: 8.8 MG/DL (ref 8.6–10.3)
CHLORIDE SERPL-SCNC: 103 MMOL/L (ref 98–107)
CHOLEST SERPL-MCNC: 139 MG/DL (ref 0–199)
CHOLESTEROL/HDL RATIO: 3.4
CO2 SERPL-SCNC: 32 MMOL/L (ref 21–32)
CREAT SERPL-MCNC: 0.82 MG/DL (ref 0.5–1.05)
EGFRCR SERPLBLD CKD-EPI 2021: >90 ML/MIN/1.73M*2
ERYTHROCYTE [DISTWIDTH] IN BLOOD BY AUTOMATED COUNT: 14.3 % (ref 11.5–14.5)
EST. AVERAGE GLUCOSE BLD GHB EST-MCNC: 85 MG/DL
GLUCOSE SERPL-MCNC: 74 MG/DL (ref 74–99)
HBA1C MFR BLD: 4.6 %
HCT VFR BLD AUTO: 38.1 % (ref 36–46)
HDLC SERPL-MCNC: 40.4 MG/DL
HGB BLD-MCNC: 11.9 G/DL (ref 12–16)
LDLC SERPL CALC-MCNC: 82 MG/DL
MCH RBC QN AUTO: 31.2 PG (ref 26–34)
MCHC RBC AUTO-ENTMCNC: 31.2 G/DL (ref 32–36)
MCV RBC AUTO: 100 FL (ref 80–100)
NON HDL CHOLESTEROL: 99 MG/DL (ref 0–149)
NRBC BLD-RTO: 0 /100 WBCS (ref 0–0)
PLATELET # BLD AUTO: 254 X10*3/UL (ref 150–450)
POTASSIUM SERPL-SCNC: 4.4 MMOL/L (ref 3.5–5.3)
PROT SERPL-MCNC: 6 G/DL (ref 6.4–8.2)
RBC # BLD AUTO: 3.82 X10*6/UL (ref 4–5.2)
SODIUM SERPL-SCNC: 141 MMOL/L (ref 136–145)
T3 SERPL-MCNC: 74 NG/DL (ref 60–200)
TRIGL SERPL-MCNC: 82 MG/DL (ref 0–149)
TSH SERPL-ACNC: 2.42 MIU/L (ref 0.44–3.98)
VLDL: 16 MG/DL (ref 0–40)
WBC # BLD AUTO: 7.7 X10*3/UL (ref 4.4–11.3)

## 2024-11-07 PROCEDURE — 84480 ASSAY TRIIODOTHYRONINE (T3): CPT

## 2024-11-07 PROCEDURE — 80053 COMPREHEN METABOLIC PANEL: CPT

## 2024-11-07 PROCEDURE — 98941 CHIROPRACT MANJ 3-4 REGIONS: CPT | Performed by: CHIROPRACTOR

## 2024-11-07 PROCEDURE — 36415 COLL VENOUS BLD VENIPUNCTURE: CPT

## 2024-11-07 PROCEDURE — 85027 COMPLETE CBC AUTOMATED: CPT

## 2024-11-07 PROCEDURE — 84443 ASSAY THYROID STIM HORMONE: CPT

## 2024-11-07 PROCEDURE — 83036 HEMOGLOBIN GLYCOSYLATED A1C: CPT

## 2024-11-07 PROCEDURE — 97140 MANUAL THERAPY 1/> REGIONS: CPT | Performed by: CHIROPRACTOR

## 2024-11-07 PROCEDURE — 80061 LIPID PANEL: CPT

## 2024-11-08 ASSESSMENT — ENCOUNTER SYMPTOMS
DIFFICULTY URINATING: 0
COLOR CHANGE: 0
DIZZINESS: 0
VOMITING: 0
FEVER: 0
SHORTNESS OF BREATH: 0
FATIGUE: 1
DYSURIA: 0
AGITATION: 0
DIARRHEA: 0
NAUSEA: 0

## 2024-11-08 NOTE — PROGRESS NOTES
Assessment/Plan   Today's presentation is consistent with fibromyalgia/myofascial pain syndrome alongside postural strain and somatic dysfunction contributing to her pain syndrome, also has been diagnosed with inflammatory arthritis (not rheumatoid), PCOS, and has had elevated ESR/CRP and been under rheumatologic care.  Complicating factors include chronicity of symptoms as well as body habitus.  We will implement a trial of care to include various manipulative techniques which will range from instrument assisted to diversified.  We will utilize soft tissue techniques such as active release technique to the cervical spine musculature.  We will closely monitor their response to care to determine if any changes need to occur, if advanced imaging is needed, or if referral to other providers is appropriate. She will also be receiving acupuncture and integrative medicine consultation which is appropriate when considering her overall presentation     Full spine EOS radiographs 2024 - 12 degree dextroconvex scoliosis from T11 to L3. Hyperkyphosis in TS and hyperlordosis in LS    LS radiographs 2024 - IMPRESSION:  No acute pathologic findings are identified.    CS radiographs 2024 - IMPRESSION:  No pathologic findings are identified.    TS radiographs 2024 - IMPRESSION:  No acute pathologic findings are identified.  Thoracic lumbar dextroscoliosis.  Lower thoracic mild spondylosis.    Visits this year: 16 (under new insurance)    Subjective   Patient reports that she had a severe migraine on 11/8/24 where she was in bed from 1pm until the following morning. It was a typical bifrontal/temporal headache pattern for her, just more intense. Her back pain is mild to moderate. She flipped her mattress and notes that she has less back pain in the mornings now.     HPI - 10/05/23 (CR): the patient presents today per the referral of Dr. Suráez and pain management for evaluation and and management of chronic full spine complaints.  She has in the past been diagnosed with fibromyalgia. She has dealt with pain for several years and has received chiropractic care in the past. She did have mixed results with chiropractic care in the past, reporting a variation of instrument assisted manipulation and manual manipulation. But she wished to attend chiropractic care again through a hospital-based provider. She is under the care of pain management and will be starting infusions next week. She is also on the wait list for infusions at [facility] if needed. Overall, her pain levels remain moderate to severe. Her pain is constant. Symptoms in the lower back and hips seem to be the area of most intensity. But she does continue to experience pain throughout the spine. She experiences paresthesias in the upper and lower extremities bilaterally. She has difficulty finding any comfortable positions     Review of Systems   Constitutional:  Positive for fatigue. Negative for fever.   Eyes:  Negative for visual disturbance.   Respiratory:  Negative for shortness of breath.    Cardiovascular:  Negative for chest pain.   Gastrointestinal:  Negative for diarrhea, nausea and vomiting.   Genitourinary:  Negative for difficulty urinating and dysuria.   Skin:  Negative for color change.   Neurological:  Negative for dizziness.   Psychiatric/Behavioral:  Negative for agitation.    All other systems reviewed and are negative.    Objective   Examination findings (e.g., palpation & ROM): Decreased C/S and L/S ROM with pain, hypertonic and tender cervical and lumbar erectors, BL upper trapezius    Segmental joint dysfunction was identified in the following areas using motion palpation and/or pain provocation assessment:  Cervical: 2  Thoracic: 5-7  Lumbopelvic: 1-3, BL SIJ      Physical Exam  Neurological:      General: No focal deficit present.      Mental Status: She is alert.      Motor: Motor function is intact.      Coordination: Coordination is intact.      Gait:  Gait is intact.      Deep Tendon Reflexes: Reflexes are normal and symmetric.      Reflex Scores:       Tricep reflexes are 2+ on the right side and 2+ on the left side.       Bicep reflexes are 2+ on the right side and 2+ on the left side.       Brachioradialis reflexes are 2+ on the right side and 2+ on the left side.       Patellar reflexes are 2+ on the right side and 2+ on the left side.       Achilles reflexes are 2+ on the right side and 2+ on the left side.     Comments: Trace Jaylin's BL  9/3/24       Plan   Today's treatment:  SMT to regions of segmental dysfunction identified on exam, using age-appropriate force, and manual diversified technique.   Mobilization on CS  STM to patient tolerance to hypertonic paraspinal muscles, upper trapezius  Manual dynamic stretching of the periscapular mm BL  9:20 to 9:35 AM  Patient noted improved mobility and reduced pain post-treatment    Treatment Plan:   The patient and I discussed the risks and benefits of chiropractic care. Based on the patient's subjective complaints along with the examination findings, it is advised that a course of chiropractic treatment be initiated. The patient provided consent for care. The patient tolerated today's treatment with little or no additional discomfort and was instructed to contact the office for questions or concerns. Will see patient once per week then every 2 weeks when symptoms become mild/manageable, further spaced apart contingent upon improvement.     This chart note was generated using dictation software, and as such, there may be typographical errors present. Abbreviations: Cervical spine (CS), cervical-thoracic (CT), Dry needling (DN), Flexion adduction internal rotation (FAIR), high velocity, low amplitude (HVLA), Lumbar spine (LS), Soft tissue manipulation (STM), spinal manipulative therapy (SMT), Straight leg raise (SLR), Thoracic spine (TS).

## 2024-11-11 ENCOUNTER — TELEPHONE (OUTPATIENT)
Dept: NEUROLOGY | Facility: CLINIC | Age: 34
End: 2024-11-11
Payer: COMMERCIAL

## 2024-11-11 DIAGNOSIS — G43.119 INTRACTABLE MIGRAINE WITH AURA WITHOUT STATUS MIGRAINOSUS: ICD-10-CM

## 2024-11-11 RX ORDER — DIVALPROEX SODIUM 250 MG/1
TABLET, FILM COATED, EXTENDED RELEASE ORAL
Qty: 16 TABLET | Refills: 0 | Status: SHIPPED | OUTPATIENT
Start: 2024-11-11

## 2024-11-11 RX ORDER — METHYLPREDNISOLONE 4 MG/1
TABLET ORAL
Qty: 21 TABLET | Refills: 0 | Status: SHIPPED | OUTPATIENT
Start: 2024-11-11 | End: 2024-11-18

## 2024-11-11 RX ORDER — DIVALPROEX SODIUM 250 MG/1
250 TABLET, FILM COATED, EXTENDED RELEASE ORAL DAILY
Qty: 30 TABLET | Refills: 2 | Status: SHIPPED | OUTPATIENT
Start: 2024-11-11

## 2024-11-11 NOTE — TELEPHONE ENCOUNTER
----- Message from Dottie Heath sent at 11/11/2024  2:58 PM EST -----  Regarding: RE: PATIENT SEEKING ADVISE  Yes but continue depakote. Also Farida is returning tomorrow so Ill add her on so she can see what we did  ----- Message -----  From: Roselia Potter RN  Sent: 11/11/2024  10:51 AM EST  To: Dottie Heath MD  Subject: RE: PATIENT SEEKING ADVISE                       Character of headache is same as always. Not as severe as Friday but seems to be OK when she wakes in the morning and within an hour, headache returns.   All treatments have been inconsistent with efficacy.   Most helpful was Medrol and Depakote together . OK to RX with MRI due 11-?  ----- Message -----  From: Dottie Heath MD  Sent: 11/8/2024   3:46 PM EST  To: Roselia Potter RN  Subject: FW: PATIENT SEEKING ADVISE                       Can you help me with Renae patient? It looks like they recently either up titrated depakote or did a burst. It also looks like medrol was helpful in the ppast. It looks like she uses Trudhesa for acute therapy. She is due for Botox on 11/27 or around there that would be her second. I am suspicious it is wearing off.  ----- Message -----  From: Farida Moreno MA  Sent: 11/8/2024   2:49 PM EST  To: Dottie Heath MD  Subject: PATIENT SEEKING ADVISE                           Patient is having debilitating migraines and partner wanted to know what can she do, she also has an MRI on the 15th and wanted it to be expedited she can barely moved and is extreme pain.     Please advise at your earliest convenience.     [phone] Adrian partner.

## 2024-11-11 NOTE — TELEPHONE ENCOUNTER
----- Message from Dottie Heath sent at 11/11/2024  2:58 PM EST -----  Regarding: RE: PATIENT SEEKING ADVISE  Yes but continue depakote. Also Farida is returning tomorrow so Ill add her on so she can see what we did  ----- Message -----  From: Roselia Potter RN  Sent: 11/11/2024  10:51 AM EST  To: Dottie Heath MD  Subject: RE: PATIENT SEEKING ADVISE                       Character of headache is same as always. Not as severe as Friday but seems to be OK when she wakes in the morning and within an hour, headache returns.   All treatments have been inconsistent with efficacy.   Most helpful was Medrol and Depakote together . OK to RX with MRI due 11-?  ----- Message -----  From: Dottie Heath MD  Sent: 11/8/2024   3:46 PM EST  To: Roselia Potter RN  Subject: FW: PATIENT SEEKING ADVISE                       Can you help me with Renae patient? It looks like they recently either up titrated depakote or did a burst. It also looks like medrol was helpful in the ppast. It looks like she uses Trudhesa for acute therapy. She is due for Botox on 11/27 or around there that would be her second. I am suspicious it is wearing off.  ----- Message -----  From: Farida Moreno MA  Sent: 11/8/2024   2:49 PM EST  To: Dottie Heath MD  Subject: PATIENT SEEKING ADVISE                           Patient is having debilitating migraines and partner wanted to know what can she do, she also has an MRI on the 15th and wanted it to be expedited she can barely moved and is extreme pain.     Please advise at your earliest convenience.     [phone] Adrian partner.

## 2024-11-12 ENCOUNTER — APPOINTMENT (OUTPATIENT)
Dept: INTEGRATIVE MEDICINE | Facility: CLINIC | Age: 34
End: 2024-11-12
Payer: COMMERCIAL

## 2024-11-12 DIAGNOSIS — G89.29 CHRONIC LOW BACK PAIN WITH SCIATICA, SCIATICA LATERALITY UNSPECIFIED, UNSPECIFIED BACK PAIN LATERALITY: ICD-10-CM

## 2024-11-12 DIAGNOSIS — M99.04 SACROILIAC JOINT SOMATIC DYSFUNCTION: ICD-10-CM

## 2024-11-12 DIAGNOSIS — M79.10 MYALGIA: ICD-10-CM

## 2024-11-12 DIAGNOSIS — M54.2 NECK PAIN: ICD-10-CM

## 2024-11-12 DIAGNOSIS — M99.01 SOMATIC DYSFUNCTION OF CERVICAL REGION: ICD-10-CM

## 2024-11-12 DIAGNOSIS — M99.03 SOMATIC DYSFUNCTION OF LUMBAR REGION: Primary | ICD-10-CM

## 2024-11-12 DIAGNOSIS — M99.02 SOMATIC DYSFUNCTION OF THORACIC REGION: ICD-10-CM

## 2024-11-12 DIAGNOSIS — M54.40 CHRONIC LOW BACK PAIN WITH SCIATICA, SCIATICA LATERALITY UNSPECIFIED, UNSPECIFIED BACK PAIN LATERALITY: ICD-10-CM

## 2024-11-12 DIAGNOSIS — R51.9 CHRONIC DAILY HEADACHE: ICD-10-CM

## 2024-11-12 PROCEDURE — 97112 NEUROMUSCULAR REEDUCATION: CPT | Performed by: CHIROPRACTOR

## 2024-11-12 PROCEDURE — 98941 CHIROPRACT MANJ 3-4 REGIONS: CPT | Performed by: CHIROPRACTOR

## 2024-11-15 ENCOUNTER — HOSPITAL ENCOUNTER (OUTPATIENT)
Dept: RADIOLOGY | Facility: CLINIC | Age: 34
Discharge: HOME | End: 2024-11-15
Payer: COMMERCIAL

## 2024-11-15 ENCOUNTER — APPOINTMENT (OUTPATIENT)
Dept: RADIOLOGY | Facility: CLINIC | Age: 34
End: 2024-11-15
Payer: COMMERCIAL

## 2024-11-15 DIAGNOSIS — R51.9 WORSENING HEADACHES: ICD-10-CM

## 2024-11-15 PROCEDURE — 70553 MRI BRAIN STEM W/O & W/DYE: CPT

## 2024-11-15 PROCEDURE — A9575 INJ GADOTERATE MEGLUMI 0.1ML: HCPCS | Performed by: NURSE PRACTITIONER

## 2024-11-15 PROCEDURE — 2550000001 HC RX 255 CONTRASTS: Performed by: NURSE PRACTITIONER

## 2024-11-15 RX ORDER — GADOTERATE MEGLUMINE 376.9 MG/ML
0.2 INJECTION INTRAVENOUS
Status: COMPLETED | OUTPATIENT
Start: 2024-11-15 | End: 2024-11-15

## 2024-11-15 ASSESSMENT — ENCOUNTER SYMPTOMS
FATIGUE: 1
DIZZINESS: 0
DYSURIA: 0
DIARRHEA: 0
AGITATION: 0
DIFFICULTY URINATING: 0
FEVER: 0
COLOR CHANGE: 0
SHORTNESS OF BREATH: 0
NAUSEA: 0
VOMITING: 0

## 2024-11-15 NOTE — PROGRESS NOTES
Assessment/Plan   Today's presentation is consistent with fibromyalgia/myofascial pain syndrome alongside postural strain and somatic dysfunction contributing to her pain syndrome, also has been diagnosed with inflammatory arthritis (not rheumatoid), PCOS, and has had elevated ESR/CRP and been under rheumatologic care.  Complicating factors include chronicity of symptoms as well as body habitus.  We will implement a trial of care to include various manipulative techniques which will range from instrument assisted to diversified.  We will utilize soft tissue techniques such as active release technique to the cervical spine musculature.  We will closely monitor their response to care to determine if any changes need to occur, if advanced imaging is needed, or if referral to other providers is appropriate. She will also be receiving acupuncture and integrative medicine consultation which is appropriate when considering her overall presentation     Full spine EOS radiographs 2024 - 12 degree dextroconvex scoliosis from T11 to L3. Hyperkyphosis in TS and hyperlordosis in LS    Brain MRI 2024 - IMPRESSION:  1. No MR evidence of acute intracranial infarct, hemorrhage, mass, or  mass effect.  2. Nonspecific 6 mm white matter FLAIR signal hyperintensity adjacent  to the trigone of the right lateral ventricle, which may be  indicative of minimal infectious inflammatory change, migraine  disorder, demyelinating disease, or vasculitis for instance in the  appropriate clinical context. Consider follow-up exam if clinically  indicated.    LS radiographs 2024 - IMPRESSION:  No acute pathologic findings are identified.    CS radiographs 2024 - IMPRESSION:  No pathologic findings are identified.    TS radiographs 2024 - IMPRESSION:  No acute pathologic findings are identified.  Thoracic lumbar dextroscoliosis.  Lower thoracic mild spondylosis.    Visits this year: 17 (under new insurance)    Subjective   Patient reports increased  "soreness due to packing and getting ready to move. She also reports that changes in weather seem to increase her symptoms. Endorses a current \"low grade\" headache. She is going to follow up with neurology soon.     HPI - 10/05/23 (CR): the patient presents today per the referral of Dr. Suárez and pain management for evaluation and and management of chronic full spine complaints. She has in the past been diagnosed with fibromyalgia. She has dealt with pain for several years and has received chiropractic care in the past. She did have mixed results with chiropractic care in the past, reporting a variation of instrument assisted manipulation and manual manipulation. But she wished to attend chiropractic care again through a hospital-based provider. She is under the care of pain management and will be starting infusions next week. She is also on the wait list for infusions at [facility] if needed. Overall, her pain levels remain moderate to severe. Her pain is constant. Symptoms in the lower back and hips seem to be the area of most intensity. But she does continue to experience pain throughout the spine. She experiences paresthesias in the upper and lower extremities bilaterally. She has difficulty finding any comfortable positions     Review of Systems   Constitutional:  Positive for fatigue. Negative for fever.   Eyes:  Negative for visual disturbance.   Respiratory:  Negative for shortness of breath.    Cardiovascular:  Negative for chest pain.   Gastrointestinal:  Negative for diarrhea, nausea and vomiting.   Genitourinary:  Negative for difficulty urinating and dysuria.   Skin:  Negative for color change.   Neurological:  Negative for dizziness.   Psychiatric/Behavioral:  Negative for agitation.    All other systems reviewed and are negative.    Objective   Examination findings (e.g., palpation & ROM): Decreased C/S and L/S ROM with pain, hypertonic and tender cervical and lumbar erectors, BL upper " trapezius    Segmental joint dysfunction was identified in the following areas using motion palpation and/or pain provocation assessment:  Cervical: 2  Thoracic: 5-7  Lumbopelvic: 1-3, BL SIJ      Physical Exam  Neurological:      General: No focal deficit present.      Mental Status: She is alert.      Motor: Motor function is intact.      Coordination: Coordination is intact.      Gait: Gait is intact.      Deep Tendon Reflexes: Reflexes are normal and symmetric.      Reflex Scores:       Tricep reflexes are 2+ on the right side and 2+ on the left side.       Bicep reflexes are 2+ on the right side and 2+ on the left side.       Brachioradialis reflexes are 2+ on the right side and 2+ on the left side.       Patellar reflexes are 2+ on the right side and 2+ on the left side.       Achilles reflexes are 2+ on the right side and 2+ on the left side.     Comments: Cam Mosqueda's BL  9/3/24       Plan   Today's treatment:  SMT to regions of segmental dysfunction identified on exam, using age-appropriate force, and manual diversified technique.   Mobilization on CS  STM to patient tolerance to hypertonic paraspinal muscles, upper trapezius  Manual dynamic stretching of the periscapular mm BL  9:24to 9:39 AM  Patient noted improved mobility and reduced pain post-treatment    Treatment Plan:   The patient and I discussed the risks and benefits of chiropractic care. Based on the patient's subjective complaints along with the examination findings, it is advised that a course of chiropractic treatment be initiated. The patient provided consent for care. The patient tolerated today's treatment with little or no additional discomfort and was instructed to contact the office for questions or concerns. Will see patient once per week then every 2 weeks when symptoms become mild/manageable, further spaced apart contingent upon improvement.     This chart note was generated using dictation software, and as such, there may be  typographical errors present. Abbreviations: Cervical spine (CS), cervical-thoracic (CT), Dry needling (DN), Flexion adduction internal rotation (FAIR), high velocity, low amplitude (HVLA), Lumbar spine (LS), Soft tissue manipulation (STM), spinal manipulative therapy (SMT), Straight leg raise (SLR), Thoracic spine (TS).

## 2024-11-15 NOTE — PROGRESS NOTES
"  Patient ID: Sasha Longoria is a 34 y.o. female who presents for No chief complaint on file..    Referring Provider: STEPHANIE Noriega    Pt was referred for weight managment, insulin resistance, interested in Continuous glucose monitor   Last visit with referring provider: 3/1/24    Subjective     Preferred Pharmacy:    [id number] - [place], [place] - [address] [facility]  [address] [facility] [address]  Phone: [phone] Fax: [phone]    [facility] - [place], [place] - [address]  [address] [facility] [address]  Phone: [phone] Fax: [phone]    [facility] - [facility]   [facility]  [facility] [address]  Phone: [phone] Fax: [phone]    [id number] @ [facility] [address]  [id number]  [facility] [address]  Phone: [phone] Fax: [phone]      Adherence:   Do you have copays on your medications? Yes  Do you have trouble affording your current medications? No, but in \"limbo\" was laid off, still looking for a job. Has previous employers insurance currently through CO[employer] (has ~2 more months remaining).   How many doses of medication did you miss in the last 7 days? 1 dose, morning most often, so many pills, gets distracted and forgets to come back to take all meds.     Subjective/Objective:      Patient identified goal:   Increase endurance (VO2 Max is \"terrible\")  Energy to do ADL, regaining independence  More active, without needing a week of recovery time  More comfortable physically  \"A few years ago\" was at 147 lbs and felt good, today desires to be at a size that is healthy and manageable to maintain.     Onset:   When fibromyalgia was triggered, fall of 2020.   Lifetime of chronic stress and trauma. Traumatic event occurred at this time.   Used to be active, exercising 6 days/week, noticed she couldn't do what she usually " "could with weight training.     Past success:   X 2 years exercised 6 d/week with an active rest day along with caloric restriction.     Support network:   Partner  Therapist  Friends    How long implementing diet/lifestyle change for weight loss:   3 months this time, previously x 2 years.     Disordered eating patterns:   Binge eating disorder without purging - not current- when seeking comfort    Concurrent chronic conditions:   Lab Results   Component Value Date    CHHDL 3.4 11/07/2024    TRIG 82 11/07/2024       Prediabetes/Diabetes? Insulin resistance (TAURUS-IR 4.4)    Pertinent PMH Review:   PMH of Pancreatitis: No  PMH of Gallbladder disease: No  PMH of Delayed Gastric Emptying:  Unsure, hx of stomach cramps/pains/gassy/bloating  PMH of MTC: No  PMH of Retinopathy: No, visits eye doctor regularly because of \"potential retinal detachment in one eye\"   PMH of Urinary Tract Infections: No  PMH of Suicidal Ideation: Yes, past.   Female on oral contraceptive? IUD    Migraines? Yes, 12-13 years ago.   Allergies? Hives/Rash with intensive immune response, has had immune panels that came back negative. Sensitive skin, dermatitis.     Anxiety? Yes    Thyroid health:   Lab Results   Component Value Date    TSH 2.42 11/07/2024        Medications potentially contributing to weight gain:   Antipsychotics/Mood stabilizers/Antiepileptics/Nerve pain: gabapentin,      Medications tried/stopped for weight management:     None, past provider tried Wegovy but insurance denied because also on topiramate.     HPI    Objective     There were no vitals taken for this visit.     Labs  Lab Results   Component Value Date    BILITOT 0.4 11/07/2024    CALCIUM 8.8 11/07/2024    CO2 32 11/07/2024     11/07/2024    CREATININE 0.82 11/07/2024    GLUCOSE 74 11/07/2024    ALKPHOS 77 11/07/2024    K 4.4 11/07/2024    PROT 6.0 (L) 11/07/2024     11/07/2024    AST 12 11/07/2024    ALT 16 11/07/2024    BUN 11 11/07/2024    ANIONGAP 10 " "11/07/2024    ALBUMIN 4.0 11/07/2024     Lab Results   Component Value Date    TRIG 82 11/07/2024    CHOL 139 11/07/2024    LDLCALC 82 11/07/2024    HDL 40.4 11/07/2024     Lab Results   Component Value Date    HGBA1C 4.6 11/07/2024    HGBA1C 5.1 02/29/2024    HGBA1C 5.1 02/20/2024     The ASCVD Risk score (Yesenia DK, et al., 2019) failed to calculate for the following reasons:    The 2019 ASCVD risk score is only valid for ages 40 to 79  No results found for: \"VITD25\"    Current Outpatient Medications on File Prior to Visit   Medication Sig Dispense Refill    acetaminophen (Tylenol 8 HOUR) 650 mg ER tablet Take 1 tablet (650 mg) by mouth every 8 hours if needed for mild pain (1 - 3). Do not crush, chew, or split. Patient takes daily, up to 3000mg/day.      alpha lipoic acid 600 mg capsule Take by mouth. 1 capsule daily after meal 1 week  Then 2 capsules for 1 week  Then 3 capsules for 1 week  Then 4 capsules daily.      buPROPion XL (Wellbutrin XL) 300 mg 24 hr tablet TAKE 1 TABLET BY MOUTH EVERY DAY IN THE MORNING AS DIRECTED      candesartan (Atacand) 16 mg tablet Take 1 tablet (16 mg) by mouth once daily.      cholecalciferol (Vitamin D-3) 25 MCG (1000 UT) capsule Take 2 capsules (50 mcg) by mouth once daily. (Patient taking differently: Take 1 capsule (25 mcg) by mouth once daily.)      clobetasol (Temovate) 0.05 % ointment once daily as needed.      coenzyme Q-10 (Co Q-10) 200 mg capsule Take 1 capsule (200 mg) by mouth 3 times a day.      cyanocobalamin, vitamin B-12, 1,000 mcg/mL drops Take by mouth once daily. 1 dropper per day      dihydroergotamine (Trudhesa) 0.725 mg/pump act. (4 mg/mL) nasal spray Administer 1 spray into each nostril every 1 hour if needed for migraine. Dose may be repeated in 1 hour after the first dose. No more than 2 doses within 24 hours or 3 doses within 7 days. 6 each 3    divalproex (Depakote ER) 250 mg 24 hr tablet Do not crush, chew, or split. 3 tablets daily for 3 days then 2 " tablets daily for 3 days then ongoing 1 tablet daily 250mg. See additional RX 16 tablet 0    divalproex (Depakote ER) 250 mg 24 hr tablet Take 1 tablet (250 mg) by mouth once daily. Do not crush, chew, or split. When initial 6 day taper is complete for ongoing daily treatment 30 tablet 2    divalproex (Depakote) 250 mg EC tablet Take 3 tablets (750 mg) by mouth once daily for 3 days, THEN 2 tablets (500 mg) once daily for 3 days, THEN 1 tablet (250 mg) once daily for 1 day. Do not crush, chew, or split.. 16 tablet 0    DULoxetine (Cymbalta) 60 mg DR capsule Take 1 capsule (60 mg) by mouth once daily. Do not crush or chew.      ferrous sulfate, 325 mg ferrous sulfate, tablet Take 1 tablet (325 mg) by mouth once daily with breakfast. 48 tablet 3    folic acid/vit B complex and C (B COMPLEX-VITAMIN C-FOLIC ACID ORAL) Take 1 tablet by mouth 2 times a day.      gabapentin (Neurontin) 300 mg capsule Take 1 capsule (300 mg) by mouth 3 times a day. 270 capsule 0    ketoconazole (NIZOral) 2 % shampoo Lather onto scalp and let sit for 5 mins before rinsing once daily until improvement.  May decrease to once-twice weekly for maintenance.      leflunomide (Arava) 10 mg tablet       levonorgestrel (Mirena) 21 mcg/24 hours (8 yrs) 52 mg IUD by intrauterine route.      MAGNESIUM GLYCINATE ORAL Take 1 capsule by mouth once daily.      methylPREDNISolone (Medrol Dospak) 4 mg tablets Follow schedule on package instructions 21 tablet 0    omeprazole (PriLOSEC) 40 mg DR capsule Take 1 capsule (40 mg) by mouth once daily. Do not crush or chew. 30 capsule 11    ondansetron (Zofran) 4 mg tablet Take 2 tablets (8 mg) by mouth every 8 hours if needed for nausea or vomiting. 20 tablet 1    Qulipta 60 mg tablet tablet Take 1 tablet (60 mg) by mouth once daily. 30 tablet 3    sennosides (Senokot) 8.6 mg tablet Take 1 tablet (8.6 mg) by mouth once daily. 90 tablet 3    sodium,potassium,mag sulfates (Suprep) 17.5-3.13-1.6 gram recon soln  solution Take 1 bottle by mouth 2 times a day. Take one bottle twice as directed by the prep instructions 354 mL 0    sodium,potassium,mag sulfates (Suprep) 17.5-3.13-1.6 gram recon soln solution Take 1 bottle by mouth 2 times a day. Take one bottle twice as directed by the prep instructions 354 mL 0    sulfaSALAzine (Azulfidine) 500 mg DR tablet       tirzepatide, weight loss, (Zepbound) 5 mg/0.5 mL injection Inject 5 mg under the skin every 7 days. 4 each 11    trazodone HCl (TRAZODONE ORAL) Take 100 mg by mouth as needed at bedtime.      triamcinolone (Kenalog) 0.1 % ointment Apply to elbows twice daily only as needed for irritation.  Do not apply more than 21 days per month.       Current Facility-Administered Medications on File Prior to Visit   Medication Dose Route Frequency Provider Last Rate Last Admin    [COMPLETED] gadoterate meglumine (Dotarem) 0.5 mmol/mL contrast injection 24 mL  0.2 mL/kg intravenous Once in imaging Farida Freeman, APRN-CNP   24 mL at 11/15/24 1426        Medication and allergy reconciliation completed     Drug Interactions       Assessment/Plan   Sasha started Zepbound 5mg last week (Tuesday)  Upset stomach initially, resolved  Noticeably reduced appetite.   She has enrolled in a program provided by her insurance in order to obtain zepbound coverage.   Last weight on 11/13 weight was 267.1 lbs, -12 lbs  She has 3 more doses of 5 mg remaining.   Patient will let me know in approximately 2 weeks if she would like to stay at 5mg or increase.   A1C 4.6%  CMP shows low protein  Recommended talking with her Functional Medicine Provider about optimizing dietary protein digestion.   Continues to experience fatigue  Overall feels energy has improved, but sleep continues to be an issue. Feels energy is lowest in the afternoon. Occasionally, needs to step away from work and nap (2-3 x/month).   Focusing on getting 7-8 hours of sleep nightly.   Working with Wendy Aguilar DC for Functional  Medicine, next apt Dec 2nd.   She has prescribed a number of supplements, recommended she share this list with her medical providers as many of these are not in epic.   Magnesium glycinate  GI Revive Powder  Vitamin B12   ADK Evail  Yeastonil  NAC  Sacro-B  Mitocore  ALA  CoQ10  Akkermansia       CONTINUE  Zepbound 5mg once weekly  **Update [facility] pharmacy is out of network, patient would prefer rx is sent [facility].     Follow-up: 12/20/24 9:20am     Time spent with pt: Total length of time 20 (minutes) of the encounter and more than 50% was spent counseling the patient.      Briseida Polanco, Pharm.D, FAPaul [facility]. [facility], [facility]  Clinical Pharmacist  Pharmacy Services  [phone]    Continue all meds under the continuation of care with the referring provider and clinical pharmacy team.    Verbal consent to manage patient's drug therapy was obtained from the patient and/or an individual authorized to act on behalf of a patient. They were informed they may decline to participate or withdraw from participation in pharmacy services at any time.

## 2024-11-18 ENCOUNTER — APPOINTMENT (OUTPATIENT)
Dept: PHARMACY | Facility: HOSPITAL | Age: 34
End: 2024-11-18
Payer: COMMERCIAL

## 2024-11-18 DIAGNOSIS — E88.819 INSULIN RESISTANCE: ICD-10-CM

## 2024-11-18 DIAGNOSIS — R93.0 ABNORMAL MRI OF HEAD: Primary | ICD-10-CM

## 2024-11-19 ENCOUNTER — APPOINTMENT (OUTPATIENT)
Dept: INTEGRATIVE MEDICINE | Facility: CLINIC | Age: 34
End: 2024-11-19
Payer: COMMERCIAL

## 2024-11-19 DIAGNOSIS — G89.29 CHRONIC LOW BACK PAIN WITH SCIATICA, SCIATICA LATERALITY UNSPECIFIED, UNSPECIFIED BACK PAIN LATERALITY: ICD-10-CM

## 2024-11-19 DIAGNOSIS — M99.03 SOMATIC DYSFUNCTION OF LUMBAR REGION: Primary | ICD-10-CM

## 2024-11-19 DIAGNOSIS — R51.9 CHRONIC DAILY HEADACHE: ICD-10-CM

## 2024-11-19 DIAGNOSIS — M99.04 SACROILIAC JOINT SOMATIC DYSFUNCTION: ICD-10-CM

## 2024-11-19 DIAGNOSIS — M54.2 NECK PAIN: ICD-10-CM

## 2024-11-19 DIAGNOSIS — M99.01 SOMATIC DYSFUNCTION OF CERVICAL REGION: ICD-10-CM

## 2024-11-19 DIAGNOSIS — M54.40 CHRONIC LOW BACK PAIN WITH SCIATICA, SCIATICA LATERALITY UNSPECIFIED, UNSPECIFIED BACK PAIN LATERALITY: ICD-10-CM

## 2024-11-19 DIAGNOSIS — M79.7 FIBROMYALGIA: Primary | ICD-10-CM

## 2024-11-19 DIAGNOSIS — M99.02 SOMATIC DYSFUNCTION OF THORACIC REGION: ICD-10-CM

## 2024-11-19 PROCEDURE — 98941 CHIROPRACT MANJ 3-4 REGIONS: CPT | Performed by: CHIROPRACTOR

## 2024-11-19 PROCEDURE — 97112 NEUROMUSCULAR REEDUCATION: CPT | Performed by: CHIROPRACTOR

## 2024-11-19 RX ORDER — KETAMINE HCL IN NACL, ISO-OSM 100MG/10ML
SYRINGE (ML) INJECTION ONCE
Status: CANCELLED | OUTPATIENT
Start: 2024-11-21

## 2024-11-19 RX ORDER — KETOROLAC TROMETHAMINE 30 MG/ML
30 INJECTION, SOLUTION INTRAMUSCULAR; INTRAVENOUS ONCE
Status: CANCELLED | OUTPATIENT
Start: 2024-11-21 | End: 2024-11-21

## 2024-11-20 ENCOUNTER — APPOINTMENT (OUTPATIENT)
Dept: NEUROSURGERY | Facility: CLINIC | Age: 34
End: 2024-11-20
Payer: COMMERCIAL

## 2024-11-20 ASSESSMENT — ENCOUNTER SYMPTOMS
DIARRHEA: 0
DIFFICULTY URINATING: 0
AGITATION: 0
FATIGUE: 1
SHORTNESS OF BREATH: 0
NAUSEA: 0
VOMITING: 0
COLOR CHANGE: 0
DYSURIA: 0
FEVER: 0
DIZZINESS: 0

## 2024-11-21 ENCOUNTER — INFUSION (OUTPATIENT)
Dept: INFUSION THERAPY | Facility: CLINIC | Age: 34
End: 2024-11-21
Payer: COMMERCIAL

## 2024-11-21 ENCOUNTER — APPOINTMENT (OUTPATIENT)
Dept: INTEGRATIVE MEDICINE | Facility: CLINIC | Age: 34
End: 2024-11-21
Payer: COMMERCIAL

## 2024-11-21 VITALS
TEMPERATURE: 98.1 F | HEART RATE: 97 BPM | RESPIRATION RATE: 16 BRPM | SYSTOLIC BLOOD PRESSURE: 132 MMHG | OXYGEN SATURATION: 94 % | DIASTOLIC BLOOD PRESSURE: 64 MMHG

## 2024-11-21 DIAGNOSIS — M54.2 NECK PAIN: ICD-10-CM

## 2024-11-21 DIAGNOSIS — M79.10 MYALGIA: ICD-10-CM

## 2024-11-21 DIAGNOSIS — M99.03 SOMATIC DYSFUNCTION OF LUMBAR REGION: Primary | ICD-10-CM

## 2024-11-21 DIAGNOSIS — M99.02 SOMATIC DYSFUNCTION OF THORACIC REGION: ICD-10-CM

## 2024-11-21 DIAGNOSIS — M99.01 SOMATIC DYSFUNCTION OF CERVICAL REGION: ICD-10-CM

## 2024-11-21 DIAGNOSIS — M54.40 CHRONIC LOW BACK PAIN WITH SCIATICA, SCIATICA LATERALITY UNSPECIFIED, UNSPECIFIED BACK PAIN LATERALITY: ICD-10-CM

## 2024-11-21 DIAGNOSIS — M99.04 SACROILIAC JOINT SOMATIC DYSFUNCTION: ICD-10-CM

## 2024-11-21 DIAGNOSIS — G89.29 CHRONIC LOW BACK PAIN WITH SCIATICA, SCIATICA LATERALITY UNSPECIFIED, UNSPECIFIED BACK PAIN LATERALITY: ICD-10-CM

## 2024-11-21 DIAGNOSIS — M79.7 FIBROMYALGIA: Primary | ICD-10-CM

## 2024-11-21 LAB — PREGNANCY TEST URINE, POC: NEGATIVE

## 2024-11-21 PROCEDURE — 96365 THER/PROPH/DIAG IV INF INIT: CPT | Mod: INF

## 2024-11-21 PROCEDURE — 98941 CHIROPRACT MANJ 3-4 REGIONS: CPT | Performed by: CHIROPRACTOR

## 2024-11-21 PROCEDURE — 2500000004 HC RX 250 GENERAL PHARMACY W/ HCPCS (ALT 636 FOR OP/ED): Performed by: NURSE PRACTITIONER

## 2024-11-21 PROCEDURE — 97140 MANUAL THERAPY 1/> REGIONS: CPT | Performed by: CHIROPRACTOR

## 2024-11-21 PROCEDURE — 96368 THER/DIAG CONCURRENT INF: CPT | Mod: INF

## 2024-11-21 PROCEDURE — 81025 URINE PREGNANCY TEST: CPT

## 2024-11-21 PROCEDURE — 96375 TX/PRO/DX INJ NEW DRUG ADDON: CPT | Mod: INF

## 2024-11-21 RX ORDER — EPINEPHRINE 0.3 MG/.3ML
0.3 INJECTION SUBCUTANEOUS EVERY 5 MIN PRN
OUTPATIENT
Start: 2024-12-05

## 2024-11-21 RX ORDER — KETAMINE HCL IN NACL, ISO-OSM 100MG/10ML
SYRINGE (ML) INJECTION ONCE
OUTPATIENT
Start: 2024-12-05

## 2024-11-21 RX ORDER — NITROGLYCERIN 0.4 MG/1
0.4 TABLET SUBLINGUAL ONCE
OUTPATIENT
Start: 2024-12-05 | End: 2024-12-05

## 2024-11-21 RX ORDER — DIPHENHYDRAMINE HYDROCHLORIDE 50 MG/ML
25 INJECTION INTRAMUSCULAR; INTRAVENOUS ONCE
OUTPATIENT
Start: 2024-12-05 | End: 2024-12-05

## 2024-11-21 RX ORDER — DIPHENHYDRAMINE HYDROCHLORIDE 50 MG/ML
50 INJECTION INTRAMUSCULAR; INTRAVENOUS AS NEEDED
OUTPATIENT
Start: 2024-12-05

## 2024-11-21 RX ORDER — ALBUTEROL SULFATE 0.83 MG/ML
3 SOLUTION RESPIRATORY (INHALATION) AS NEEDED
OUTPATIENT
Start: 2024-12-05

## 2024-11-21 RX ORDER — METOCLOPRAMIDE HYDROCHLORIDE 5 MG/ML
10 INJECTION INTRAMUSCULAR; INTRAVENOUS ONCE
OUTPATIENT
Start: 2024-12-05 | End: 2024-12-05

## 2024-11-21 RX ORDER — METOPROLOL TARTRATE 25 MG/1
25 TABLET, FILM COATED ORAL ONCE
OUTPATIENT
Start: 2024-12-05 | End: 2024-12-05

## 2024-11-21 RX ORDER — FAMOTIDINE 10 MG/ML
20 INJECTION INTRAVENOUS ONCE AS NEEDED
OUTPATIENT
Start: 2024-12-05

## 2024-11-21 RX ORDER — ONDANSETRON HYDROCHLORIDE 2 MG/ML
4 INJECTION, SOLUTION INTRAVENOUS ONCE
OUTPATIENT
Start: 2024-12-05 | End: 2024-12-05

## 2024-11-21 RX ORDER — KETAMINE HCL IN NACL, ISO-OSM 100MG/10ML
SYRINGE (ML) INJECTION ONCE
Status: COMPLETED | OUTPATIENT
Start: 2024-11-21 | End: 2024-11-21

## 2024-11-21 RX ORDER — KETOROLAC TROMETHAMINE 30 MG/ML
30 INJECTION, SOLUTION INTRAMUSCULAR; INTRAVENOUS ONCE
Status: COMPLETED | OUTPATIENT
Start: 2024-11-21 | End: 2024-11-21

## 2024-11-21 RX ORDER — KETOROLAC TROMETHAMINE 30 MG/ML
30 INJECTION, SOLUTION INTRAMUSCULAR; INTRAVENOUS ONCE
OUTPATIENT
Start: 2024-12-05 | End: 2024-12-05

## 2024-11-21 ASSESSMENT — ENCOUNTER SYMPTOMS
LOSS OF SENSATION IN FEET: 1
DEPRESSION: 0
OCCASIONAL FEELINGS OF UNSTEADINESS: 1

## 2024-11-21 ASSESSMENT — PAIN SCALES - GENERAL
PAINLEVEL_OUTOF10: 7
PAINLEVEL_OUTOF10: 2

## 2024-11-21 ASSESSMENT — PAIN - FUNCTIONAL ASSESSMENT: PAIN_FUNCTIONAL_ASSESSMENT: 0-10

## 2024-11-21 NOTE — PROGRESS NOTES
S: Patient here for #24 opioid sparing pain infusion. Patient reports 45% reduction in pain after last infusion that lasted 2 weeks.    Purpose of pain infusion meds explained along with potential side effects.  Patient verbalized understanding.    B: Pain Issues. Is Patient breast feeding: ?    A: Patient currently has pain described on flow sheet documentation. Designated  is [facility]. Patient last ate solid food 12 hours ago, and had liquid 12 hours ago.    R: Plan; Obtain IV access, do patient risk assessment, and start opioid sparing infusion as ordered. Monitoring for S/S of adverse reactions.

## 2024-11-21 NOTE — PATIENT INSTRUCTIONS
Today :Sasha Longoria had no medications administered during this visit.     For: No diagnosis found.     Your next appointment is due in:  2 weeks        Please read the  Medication Guide that was given to you and reviewed during todays visit.     (Tell all doctors including dentists that you are taking this medication)     Go to the emergency room or call 911 if:  -You have signs of allergic reaction:   -Rash, hives, itching.   -Swollen, blistered, peeling skin.   -Swelling of face, lips, mouth, tongue or throat.   -Tightness of chest, trouble breathing, swallowing or talking     Call your doctor:  - If IV / injection site gets red, warm, swollen, itchy or leaks fluid or pus.     (Leave dressing on your IV site for at least 2 hours and keep area clean and dry  - If you get sick or have symptoms of infection or are not feeling well for any reason.    (Wash your hands often, stay away from people who are sick)  - If you have side effects from your medication that do not go away or are bothersome.     (Refer to the teaching your nurse gave you for side effects to call your doctor about)    - Common side effects may include:  stuffy nose, headache, feeling tired, muscle aches, upset stomach  - Before receiving any vaccines     - Call the [facility] at   If:  - You get sick, are on antibiotics, have had a recent vaccine, have surgery or dental work and your doctor wants your visit rescheduled.  - You need to cancel and reschedule your visit for any reason. Call at least 2 days before your visit if you need to cancel.   - Your insurance changes before your next visit.    (We will need to get approval from your new insurance. This can take up to two weeks.)     The [facility] is opened Monday thru Friday. We are closed on weekends and holidays.   Voice mail will take your call if the center is closed. If you leave a message please allow 24 hours for a call back during weekdays. If you  leave a message on a weekend/holiday, we will call you back the next business day.    A pharmacist is available Monday - Friday from 8:30AM to 3:30PM to help answer any questions you may have about your prescriptions(s). Please call pharmacy at:    [facility]: [phone]  [facility]: [phone]  [facility]: [phone]              [facility]      Pain Infusion Aftercare Instructions      1. It is normal to feel sedated, tired and low in energy after a pain infusion. DO NOT DRIVE, OPERATE ANY MACHINERY, OR MAKE ANY IMPORTANT DECISIONS FOR AT LEAST 24 HOURS AFTER THE INFUSION.     2. Call the pain center at [phone] with any problems, questions, or concerns.     3. Eat light after the infusion. If you feel queasy or sick to your stomach, laying down with your eyes closed may help. When you resume eating start with something mild like clear liquids, yogurt, applesauce, crackers, etc… Gradually advance to a regular diet.     4. Do not leave your house alone the evening of your pain infusion.     5. No alcohol or sedative medications, such as sleeping pills, for 24 hours after your pain infusion.     6. Resume all other prescribed medications unless directed otherwise by you physician.     7. If you have any medical emergencies, call 911 or go directly to the closest emergency room.

## 2024-11-21 NOTE — PROGRESS NOTES
Assessment/Plan   Today's presentation is consistent with fibromyalgia/myofascial pain syndrome alongside postural strain and somatic dysfunction contributing to her pain syndrome, also has been diagnosed with inflammatory arthritis (not rheumatoid), PCOS, and has had elevated ESR/CRP and been under rheumatologic care.  Complicating factors include chronicity of symptoms as well as body habitus.  We will implement a trial of care to include various manipulative techniques which will range from instrument assisted to diversified.  We will utilize soft tissue techniques such as active release technique to the cervical spine musculature.  We will closely monitor their response to care to determine if any changes need to occur, if advanced imaging is needed, or if referral to other providers is appropriate. She will also be receiving acupuncture and integrative medicine consultation which is appropriate when considering her overall presentation     Full spine EOS radiographs 2024 - 12 degree dextroconvex scoliosis from T11 to L3. Hyperkyphosis in TS and hyperlordosis in LS    Brain MRI 2024 - IMPRESSION:  1. No MR evidence of acute intracranial infarct, hemorrhage, mass, or  mass effect.  2. Nonspecific 6 mm white matter FLAIR signal hyperintensity adjacent  to the trigone of the right lateral ventricle, which may be  indicative of minimal infectious inflammatory change, migraine  disorder, demyelinating disease, or vasculitis for instance in the  appropriate clinical context. Consider follow-up exam if clinically  indicated.    LS radiographs 2024 - IMPRESSION:  No acute pathologic findings are identified.    CS radiographs 2024 - IMPRESSION:  No pathologic findings are identified.    TS radiographs 2024 - IMPRESSION:  No acute pathologic findings are identified.  Thoracic lumbar dextroscoliosis.  Lower thoracic mild spondylosis.    Visits this year: 18 (under new insurance)    Subjective   Patient reports that she is  feeling soreness in her neck, shoulders, hips and mid to lower back. She is in the process of moving, so this has increased her pain/soreness. She had an infusion with pain management this morning. Pain is frequent and moderate. Ongoing headaches without much change in intensity/frequency    HPI - 10/05/23 (CR): the patient presents today per the referral of Dr. Suárez and pain management for evaluation and and management of chronic full spine complaints. She has in the past been diagnosed with fibromyalgia. She has dealt with pain for several years and has received chiropractic care in the past. She did have mixed results with chiropractic care in the past, reporting a variation of instrument assisted manipulation and manual manipulation. But she wished to attend chiropractic care again through a hospital-based provider. She is under the care of pain management and will be starting infusions next week. She is also on the wait list for infusions at [facility] if needed. Overall, her pain levels remain moderate to severe. Her pain is constant. Symptoms in the lower back and hips seem to be the area of most intensity. But she does continue to experience pain throughout the spine. She experiences paresthesias in the upper and lower extremities bilaterally. She has difficulty finding any comfortable positions     Review of Systems   Constitutional:  Positive for fatigue. Negative for fever.   Eyes:  Negative for visual disturbance.   Respiratory:  Negative for shortness of breath.    Cardiovascular:  Negative for chest pain.   Gastrointestinal:  Negative for diarrhea, nausea and vomiting.   Genitourinary:  Negative for difficulty urinating and dysuria.   Skin:  Negative for color change.   Neurological:  Negative for dizziness.   Psychiatric/Behavioral:  Negative for agitation.    All other systems reviewed and are negative.    Objective   Examination findings (e.g., palpation & ROM): Decreased C/S and L/S ROM  with pain, hypertonic and tender cervical and lumbar erectors, BL upper trapezius    Segmental joint dysfunction was identified in the following areas using motion palpation and/or pain provocation assessment:  Cervical: 2  Thoracic: 5-7  Lumbopelvic: 1-3, BL SIJ      Physical Exam  Neurological:      General: No focal deficit present.      Mental Status: She is alert.      Motor: Motor function is intact.      Coordination: Coordination is intact.      Gait: Gait is intact.      Deep Tendon Reflexes: Reflexes are normal and symmetric.      Reflex Scores:       Tricep reflexes are 2+ on the right side and 2+ on the left side.       Bicep reflexes are 2+ on the right side and 2+ on the left side.       Brachioradialis reflexes are 2+ on the right side and 2+ on the left side.       Patellar reflexes are 2+ on the right side and 2+ on the left side.       Achilles reflexes are 2+ on the right side and 2+ on the left side.     Comments: Trace Jaylin's BL  9/3/24       Plan   Today's treatment:  SMT to regions of segmental dysfunction identified on exam, using age-appropriate force, and manual diversified technique.   Mobilization on CS  STM to patient tolerance to hypertonic paraspinal muscles, upper trapezius  Manual dynamic stretching of the periscapular mm BL  3 to 3:18 PM  Patient noted improved mobility and reduced pain post-treatment    Treatment Plan:   The patient and I discussed the risks and benefits of chiropractic care. Based on the patient's subjective complaints along with the examination findings, it is advised that a course of chiropractic treatment be initiated. The patient provided consent for care. The patient tolerated today's treatment with little or no additional discomfort and was instructed to contact the office for questions or concerns. Will see patient once per week then every 2 weeks when symptoms become mild/manageable, further spaced apart contingent upon improvement.     This chart note  was generated using dictation software, and as such, there may be typographical errors present. Abbreviations: Cervical spine (CS), cervical-thoracic (CT), Dry needling (DN), Flexion adduction internal rotation (FAIR), high velocity, low amplitude (HVLA), Lumbar spine (LS), Soft tissue manipulation (STM), spinal manipulative therapy (SMT), Straight leg raise (SLR), Thoracic spine (TS).

## 2024-11-26 ENCOUNTER — APPOINTMENT (OUTPATIENT)
Dept: INTEGRATIVE MEDICINE | Facility: CLINIC | Age: 34
End: 2024-11-26
Payer: COMMERCIAL

## 2024-11-26 DIAGNOSIS — M54.2 NECK PAIN: ICD-10-CM

## 2024-11-26 DIAGNOSIS — M99.01 SOMATIC DYSFUNCTION OF CERVICAL REGION: ICD-10-CM

## 2024-11-26 DIAGNOSIS — M99.04 SACROILIAC JOINT SOMATIC DYSFUNCTION: ICD-10-CM

## 2024-11-26 DIAGNOSIS — G89.29 CHRONIC LOW BACK PAIN WITH SCIATICA, SCIATICA LATERALITY UNSPECIFIED, UNSPECIFIED BACK PAIN LATERALITY: ICD-10-CM

## 2024-11-26 DIAGNOSIS — M99.02 SOMATIC DYSFUNCTION OF THORACIC REGION: ICD-10-CM

## 2024-11-26 DIAGNOSIS — M79.10 MYALGIA: ICD-10-CM

## 2024-11-26 DIAGNOSIS — M99.03 SOMATIC DYSFUNCTION OF LUMBAR REGION: Primary | ICD-10-CM

## 2024-11-26 DIAGNOSIS — M54.40 CHRONIC LOW BACK PAIN WITH SCIATICA, SCIATICA LATERALITY UNSPECIFIED, UNSPECIFIED BACK PAIN LATERALITY: ICD-10-CM

## 2024-11-26 PROCEDURE — 97112 NEUROMUSCULAR REEDUCATION: CPT | Performed by: CHIROPRACTOR

## 2024-11-26 PROCEDURE — 98941 CHIROPRACT MANJ 3-4 REGIONS: CPT | Performed by: CHIROPRACTOR

## 2024-11-26 ASSESSMENT — ENCOUNTER SYMPTOMS
AGITATION: 0
SHORTNESS OF BREATH: 0
NAUSEA: 0
COLOR CHANGE: 0
FEVER: 0
VOMITING: 0
DIARRHEA: 0
DIFFICULTY URINATING: 0
DYSURIA: 0
DIZZINESS: 0
FATIGUE: 1

## 2024-11-26 NOTE — PROGRESS NOTES
Assessment/Plan   Today's presentation is consistent with fibromyalgia/myofascial pain syndrome alongside postural strain and somatic dysfunction contributing to her pain syndrome, also has been diagnosed with inflammatory arthritis (not rheumatoid), PCOS, and has had elevated ESR/CRP and been under rheumatologic care.  Complicating factors include chronicity of symptoms as well as body habitus.  We will implement a trial of care to include various manipulative techniques which will range from instrument assisted to diversified.  We will utilize soft tissue techniques such as active release technique to the cervical spine musculature.  We will closely monitor their response to care to determine if any changes need to occur, if advanced imaging is needed, or if referral to other providers is appropriate. She will also be receiving acupuncture and integrative medicine consultation which is appropriate when considering her overall presentation     Full spine EOS radiographs 2024 - 12 degree dextroconvex scoliosis from T11 to L3. Hyperkyphosis in TS and hyperlordosis in LS    Brain MRI 2024 - IMPRESSION:  1. No MR evidence of acute intracranial infarct, hemorrhage, mass, or  mass effect.  2. Nonspecific 6 mm white matter FLAIR signal hyperintensity adjacent  to the trigone of the right lateral ventricle, which may be  indicative of minimal infectious inflammatory change, migraine  disorder, demyelinating disease, or vasculitis for instance in the  appropriate clinical context. Consider follow-up exam if clinically  indicated.    LS radiographs 2024 - IMPRESSION:  No acute pathologic findings are identified.    CS radiographs 2024 - IMPRESSION:  No pathologic findings are identified.    TS radiographs 2024 - IMPRESSION:  No acute pathologic findings are identified.  Thoracic lumbar dextroscoliosis.  Lower thoracic mild spondylosis.    Visits this year: 19 (under new insurance)    Subjective   Patient reports feeling  increased soreness due to moving over the weekend, however she notes that the increased activity did not flare her up as much as it would in the past. She also had a few days with no headaches. Denies current headache.  Notes mild moderate diffuse soreness through neck/back,hips, but no severe pain at moment      HPI - 10/05/23 (CR): the patient presents today per the referral of Dr. Suárez and pain management for evaluation and and management of chronic full spine complaints. She has in the past been diagnosed with fibromyalgia. She has dealt with pain for several years and has received chiropractic care in the past. She did have mixed results with chiropractic care in the past, reporting a variation of instrument assisted manipulation and manual manipulation. But she wished to attend chiropractic care again through a hospital-based provider. She is under the care of pain management and will be starting infusions next week. She is also on the wait list for infusions at [facility] if needed. Overall, her pain levels remain moderate to severe. Her pain is constant. Symptoms in the lower back and hips seem to be the area of most intensity. But she does continue to experience pain throughout the spine. She experiences paresthesias in the upper and lower extremities bilaterally. She has difficulty finding any comfortable positions     Review of Systems   Constitutional:  Positive for fatigue. Negative for fever.   Eyes:  Negative for visual disturbance.   Respiratory:  Negative for shortness of breath.    Cardiovascular:  Negative for chest pain.   Gastrointestinal:  Negative for diarrhea, nausea and vomiting.   Genitourinary:  Negative for difficulty urinating and dysuria.   Skin:  Negative for color change.   Neurological:  Negative for dizziness.   Psychiatric/Behavioral:  Negative for agitation.    All other systems reviewed and are negative.    Objective   Examination findings (e.g., palpation & ROM):  Decreased C/S and L/S ROM with pain, hypertonic and tender cervical and lumbar erectors, BL upper trapezius    Segmental joint dysfunction was identified in the following areas using motion palpation and/or pain provocation assessment:  Cervical: 2  Thoracic: 5-7  Lumbopelvic: 1-3, BL SIJ      Physical Exam  Neurological:      General: No focal deficit present.      Mental Status: She is alert.      Motor: Motor function is intact.      Coordination: Coordination is intact.      Gait: Gait is intact.      Deep Tendon Reflexes: Reflexes are normal and symmetric.      Reflex Scores:       Tricep reflexes are 2+ on the right side and 2+ on the left side.       Bicep reflexes are 2+ on the right side and 2+ on the left side.       Brachioradialis reflexes are 2+ on the right side and 2+ on the left side.       Patellar reflexes are 2+ on the right side and 2+ on the left side.       Achilles reflexes are 2+ on the right side and 2+ on the left side.     Comments: Cam Mosqueda's BL  9/3/24       Plan   Today's treatment:  SMT to regions of segmental dysfunction identified on exam, using age-appropriate force, and manual diversified technique.   Mobilization on CS  STM to patient tolerance to hypertonic paraspinal muscles, upper trapezius  Manual dynamic stretching of the periscapular mm BL  10:20 to 10:35 PM  Patient noted improved mobility and reduced pain post-treatment    Treatment Plan:   The patient and I discussed the risks and benefits of chiropractic care. Based on the patient's subjective complaints along with the examination findings, it is advised that a course of chiropractic treatment be initiated. The patient provided consent for care. The patient tolerated today's treatment with little or no additional discomfort and was instructed to contact the office for questions or concerns. Will see patient once per week then every 2 weeks when symptoms become mild/manageable, further spaced apart contingent upon  improvement.     This chart note was generated using dictation software, and as such, there may be typographical errors present. Abbreviations: Cervical spine (CS), cervical-thoracic (CT), Dry needling (DN), Flexion adduction internal rotation (FAIR), high velocity, low amplitude (HVLA), Lumbar spine (LS), Soft tissue manipulation (STM), spinal manipulative therapy (SMT), Straight leg raise (SLR), Thoracic spine (TS).

## 2024-12-02 DIAGNOSIS — M79.673 PAIN OF FOOT, UNSPECIFIED LATERALITY: Primary | ICD-10-CM

## 2024-12-02 DIAGNOSIS — D72.829 LEUKOCYTOSIS, UNSPECIFIED TYPE: ICD-10-CM

## 2024-12-03 ENCOUNTER — CONSULT (OUTPATIENT)
Dept: ALLERGY | Facility: CLINIC | Age: 34
End: 2024-12-03
Payer: COMMERCIAL

## 2024-12-03 ENCOUNTER — APPOINTMENT (OUTPATIENT)
Dept: NEUROLOGY | Facility: CLINIC | Age: 34
End: 2024-12-03
Payer: COMMERCIAL

## 2024-12-03 VITALS
HEART RATE: 83 BPM | HEIGHT: 61 IN | SYSTOLIC BLOOD PRESSURE: 116 MMHG | OXYGEN SATURATION: 97 % | BODY MASS INDEX: 50.6 KG/M2 | WEIGHT: 268 LBS | RESPIRATION RATE: 17 BRPM | DIASTOLIC BLOOD PRESSURE: 72 MMHG | TEMPERATURE: 98 F

## 2024-12-03 DIAGNOSIS — R19.7 DIARRHEA, UNSPECIFIED TYPE: ICD-10-CM

## 2024-12-03 DIAGNOSIS — K90.49 FOOD INTOLERANCE: Primary | ICD-10-CM

## 2024-12-03 DIAGNOSIS — Z91.018 FOOD ALLERGY: ICD-10-CM

## 2024-12-03 PROCEDURE — 99203 OFFICE O/P NEW LOW 30 MIN: CPT | Performed by: ALLERGY & IMMUNOLOGY

## 2024-12-03 PROCEDURE — 95004 PERQ TESTS W/ALRGNC XTRCS: CPT | Performed by: ALLERGY & IMMUNOLOGY

## 2024-12-03 NOTE — PROGRESS NOTES
Patient ID: Sasha Longoria is a 34 y.o. female.     Chief Complaint: NPV referred by Dr. Lua  History Of Present Illness  Sasha Longoria is a 34 y.o. female with PMx gi issues started in 2022 presenting for consultation for concerns related to food allergy or sensitivity.     Food Allergy  Took a medication in 2022 for arthritis.  Took hydroxychoroquine.  She felt that her gut could not tolerate. Had stomach cramps and diarrhea associated, so she discontinued the medication. Since that time, still with diarrhea, but less abdominal cramps.     Patient cannot associate with a specific food.  She did do an elimination diet with dairy and gluten.  She is not clear on if this was effective.  She re-introduced dairy and gluten and not much of a change.  June 27 2024 EGD  Result Text   Impression  Small hiatal hernia  Abnormal mucosa; performed cold forceps biopsies  Schatzki ring in the GE junction; performed cold forceps biopsy  Performed forceps biopsies in the upper third of the esophagus and middle third of the esophagus to rule out eosinophilic esophagitis  The duodenal bulb, 1st part of the duodenum and 2nd part of the duodenum appeared normal. Performed random biopsy to rule out celiac disease.        Findings  Z-line 33 cm from the incisors  Small hiatal hernia without Baron lesions present - GE junction 34 cm from the incisors, diaphragmatic impression 35 cm from the incisors. Hill grade III hiatal hernia  Cary-colored mucosa in the GE junction; performed cold forceps biopsy to rule out Hancock's esophagus  Non-obstructing Schatzki ring in the GE junction; performed cold forceps biopsy  Performed random forceps biopsies in the upper third of the esophagus and middle third of the esophagus to rule out eosinophilic esophagitis  Mild erythematous mucosa in the body of the stomach and antrum; performed cold forceps biopsy to rule out H. pylori  The duodenal bulb, 1st part of the duodenum and  2nd part of the duodenum appeared normal. Performed random biopsy using biopsy forceps to rule out celiac disease.          Eczema/ Atopic Dermatitis  Not sure if had rashes with dairy re introduction, but none at this time with foods in diet.  Asthma  No    Rhinoconjunctivitis  No  History of negative allergy testing for environmental allergy at [facility]  Drug Allergy   No    Insect Allergy   No    Infections  No history of frequent or recurrent infections      Review of Systems    Pertinent positives and negatives have been assessed in the HPI. All other systems have been reviewed and are negative except as noted in the HPI.    Allergies  Patient has no known allergies.    Past Medical History  She has a past medical history of Abnormal Pap smear of cervix, Anxiety, Arthritis (~ ), Chronic pain disorder, Depression, Eczema (~ ), Fibromyalgia, primary, GERD (gastroesophageal reflux disease) (~), Headache, Headache, tension-type, HPV (human papilloma virus) infection, Joint pain, Low back pain, Memory loss, Migraine, Neck pain, Neuromuscular disorder (Multi) (), Numbness, Obstructive sleep apnea, Polycystic ovary syndrome, Scoliosis (~), and Visual impairment (~).    Family History  Family History   Problem Relation Name Age of Onset    Breast cancer Paternal Grandmother      Arthritis Mother Haylee     Depression Mother Haylee     Diabetes Mother Haylee     Heart disease Mother Haylee     Hypertension Mother Haylee     Learning disabilities Mother Haylee     Intellectual Disability Mother Haylee     Miscarriages / Stillbirths Mother Haylee      labor Mother Haylee     Depression Father Lowell     Diabetes Father Lowell     Drug abuse Father Lowell     Hypertension Father Lowell     Mental illness Father Lowell     Alcohol abuse Maternal Grandfather Tera     Depression Maternal Grandfather Tera     Drug abuse Maternal Grandfather Tera     Heart disease Maternal Grandfather Tera     Stroke  Maternal Grandfather Tera     Cancer Maternal Grandmother Carri     Depression Maternal Grandmother Carri     Diabetes Maternal Grandmother Carri     Depression Paternal Grandfather Whit     Heart disease Paternal Grandfather Whit     Hypertension Paternal Grandfather Whit     Migraines Paternal Grandfather Whit     Cancer Sister Amina     Depression Sister Amina     Cancer Sister Cayla     Depression Sister Cayla     Drug abuse Sister Cayla     Hypertension Sister Cayla     Miscarriages / Stillbirths Sister Cayla     Depression Sister Kenzie     Depression Sister Maria L     Depression Brother Chris     Drug abuse Mother's Brother Tera     Learning disabilities Brother Crow     Intellectual Disability Brother Crow        No history of food allergy or atopic disease in the family.    Surgical History  She has a past surgical history that includes Other surgical history (09/25/2019); Other surgical history (04/26/2022); and Tonsillectomy.    Social/Environmental History  She reports that she has never smoked. She has never used smokeless tobacco. She reports current alcohol use of about 1.0 standard drink of alcohol per week. She reports that she does not currently use drugs after having used the following drugs: Marijuana. Frequency: 4.00 times per week.    Home: Lives in a apartment   Floors: vinyl  Air Conditioning: Central  Smoker: No  Pets: 2 cats  Infestations: No  Molds: No  Occupation:  for a [employer]    MEDICATIONS  Current Outpatient Medications on File Prior to Visit   Medication Sig Dispense Refill    acetaminophen (Tylenol 8 HOUR) 650 mg ER tablet Take 1 tablet (650 mg) by mouth every 8 hours if needed for mild pain (1 - 3). Do not crush, chew, or split. Patient takes daily, up to 3000mg/day.      alpha lipoic acid 600 mg capsule Take by mouth. 1 capsule daily after meal 1 week  Then 2 capsules for 1 week  Then 3 capsules for 1 week  Then 4 capsules daily.       buPROPion XL (Wellbutrin XL) 300 mg 24 hr tablet TAKE 1 TABLET BY MOUTH EVERY DAY IN THE MORNING AS DIRECTED      cholecalciferol (Vitamin D-3) 25 MCG (1000 UT) capsule Take 2 capsules (50 mcg) by mouth once daily. (Patient taking differently: Take 1 capsule (25 mcg) by mouth once daily.)      clobetasol (Temovate) 0.05 % ointment once daily as needed.      coenzyme Q-10 (Co Q-10) 200 mg capsule Take 1 capsule (200 mg) by mouth 3 times a day.      cyanocobalamin, vitamin B-12, 1,000 mcg/mL drops Take by mouth once daily. 1 dropper per day      dihydroergotamine (Trudhesa) 0.725 mg/pump act. (4 mg/mL) nasal spray Administer 1 spray into each nostril every 1 hour if needed for migraine. Dose may be repeated in 1 hour after the first dose. No more than 2 doses within 24 hours or 3 doses within 7 days. 6 each 3    divalproex (Depakote ER) 250 mg 24 hr tablet Take 1 tablet (250 mg) by mouth once daily. Do not crush, chew, or split. When initial 6 day taper is complete for ongoing daily treatment 30 tablet 2    DULoxetine (Cymbalta) 60 mg DR capsule Take 1 capsule (60 mg) by mouth once daily. Do not crush or chew.      ferrous sulfate, 325 mg ferrous sulfate, tablet Take 1 tablet (325 mg) by mouth once daily with breakfast. 48 tablet 3    ketoconazole (NIZOral) 2 % shampoo Lather onto scalp and let sit for 5 mins before rinsing once daily until improvement.  May decrease to once-twice weekly for maintenance.      leflunomide (Arava) 10 mg tablet       levonorgestrel (Mirena) 21 mcg/24 hours (8 yrs) 52 mg IUD by intrauterine route.      MAGNESIUM GLYCINATE ORAL Take 1 capsule by mouth once daily.      omeprazole (PriLOSEC) 40 mg DR capsule Take 1 capsule (40 mg) by mouth once daily. Do not crush or chew. 30 capsule 11    ondansetron (Zofran) 4 mg tablet Take 2 tablets (8 mg) by mouth every 8 hours if needed for nausea or vomiting. 20 tablet 1    Qulipta 60 mg tablet tablet Take 1 tablet (60 mg) by mouth once daily. 30  "tablet 3    sennosides (Senokot) 8.6 mg tablet Take 1 tablet (8.6 mg) by mouth once daily. 90 tablet 3    sodium,potassium,mag sulfates (Suprep) 17.5-3.13-1.6 gram recon soln solution Take 1 bottle by mouth 2 times a day. Take one bottle twice as directed by the prep instructions 354 mL 0    sulfaSALAzine (Azulfidine) 500 mg DR tablet       tirzepatide, weight loss, (Zepbound) 5 mg/0.5 mL injection Inject 5 mg under the skin every 7 days. 4 each 11    triamcinolone (Kenalog) 0.1 % ointment Apply to elbows twice daily only as needed for irritation.  Do not apply more than 21 days per month.      gabapentin (Neurontin) 300 mg capsule Take 1 capsule (300 mg) by mouth 3 times a day. 270 capsule 0     No current facility-administered medications on file prior to visit.         Physical Exam  Visit Vitals  /72   Pulse 83   Temp 36.7 °C (98 °F) (Temporal)   Resp 17   Ht 1.549 m (5' 1\")   Wt 122 kg (268 lb)   SpO2 97%   BMI 50.64 kg/m²   OB Status Implant   Smoking Status Never   BSA 2.29 m²       Wt Readings from Last 1 Encounters:   12/03/24 122 kg (268 lb)       Physical Exam    General: Well appearing, no acute distress. BMI 50  Head: Normocephalic, atraumatic, neck supple without lymphadenopathy  Eyes: non-injected  Nose: No nasal crease, nares patent, minimal discharge  Throat: Normal dentition, no erythema  Heart: Regular rate and rhythm  Lungs: Clear to auscultation bilaterally, effort normal  Abdomen: Soft, non-tender, normal bowel sounds  Extremities: Moves all extremities symmetrically, no edema  Skin: Hypopigmentation/vitiligo present  Psych: normal mood and affect    LAB RESULTS:  CBC:  Recent Labs     11/07/24  0735 02/29/24  1139 02/20/24  1429   WBC 7.7 13.6* 12.7*   HGB 11.9* 13.2 12.7   HCT 38.1 42.0 40.0    340 305    97 97       CMP:  Recent Labs     11/07/24  0735 02/29/24  1139 02/20/24  1429    138 139   K 4.4 4.7 4.5    102 104   CO2 32 31 29   ANIONGAP 10 10 11   BUN " 11 16 14   CREATININE 0.82 0.66 0.74   EGFR >90 >90 >90     Recent Labs     11/07/24  0735 02/29/24  1139 02/20/24  1429   ALBUMIN 4.0 4.2 4.2   ALKPHOS 77 98 92   ALT 16 15 15   AST 12 11 12   BILITOT 0.4 0.3 0.3           HEME/ENDO:  Recent Labs     11/07/24  0735 02/29/24  1139 02/20/24  1429   TSH 2.42 2.03 1.84   HGBA1C 4.6 5.1 5.1   FERRITIN  --  35  --    IRONSAT  --  10*  --      Allergy skin tests     Assessment/Plan   Sasha is a 35 yo with a history of chronic abdominal pain and diarrhea. Negative allergy test for milk, egg, soy, wheat, peanut, tree nut, fish and shellfish, grains, meats, fruits, vegetables, garlic, chocolate.  Consider keeping a food diary and continue follow up with Dr. Lua.    Brittany Gonzalez DO

## 2024-12-03 NOTE — PATIENT INSTRUCTIONS
Negative allergy test for milk, egg, soy, wheat, peanut, tree nut, fish and shellfish, grains, meats, fruits, vegetables, garlic, chocolate.    Consider keeping a food diary.  Follow up as needed.

## 2024-12-04 ASSESSMENT — ENCOUNTER SYMPTOMS
NAUSEA: 0
COLOR CHANGE: 0
AGITATION: 0
DYSURIA: 0
DIZZINESS: 0
SHORTNESS OF BREATH: 0
FEVER: 0
DIARRHEA: 0
DIFFICULTY URINATING: 0
VOMITING: 0
FATIGUE: 1

## 2024-12-04 NOTE — H&P (VIEW-ONLY)
Assessment/Plan   Today's presentation is consistent with fibromyalgia/myofascial pain syndrome alongside postural strain and somatic dysfunction contributing to her pain syndrome, also has been diagnosed with inflammatory arthritis (not rheumatoid), PCOS, and has had elevated ESR/CRP and been under rheumatologic care.  Complicating factors include chronicity of symptoms as well as body habitus.  We will implement a trial of care to include various manipulative techniques which will range from instrument assisted to diversified.  We will utilize soft tissue techniques such as active release technique to the cervical spine musculature.  We will closely monitor their response to care to determine if any changes need to occur, if advanced imaging is needed, or if referral to other providers is appropriate. She will also be receiving acupuncture and integrative medicine consultation which is appropriate when considering her overall presentation     Full spine EOS radiographs 2024 - 12 degree dextroconvex scoliosis from T11 to L3. Hyperkyphosis in TS and hyperlordosis in LS    Brain MRI 2024 - IMPRESSION:  1. No MR evidence of acute intracranial infarct, hemorrhage, mass, or  mass effect.  2. Nonspecific 6 mm white matter FLAIR signal hyperintensity adjacent  to the trigone of the right lateral ventricle, which may be  indicative of minimal infectious inflammatory change, migraine  disorder, demyelinating disease, or vasculitis for instance in the  appropriate clinical context. Consider follow-up exam if clinically  indicated.    LS radiographs 2024 - IMPRESSION:  No acute pathologic findings are identified.    CS radiographs 2024 - IMPRESSION:  No pathologic findings are identified.    TS radiographs 2024 - IMPRESSION:  No acute pathologic findings are identified.  Thoracic lumbar dextroscoliosis.  Lower thoracic mild spondylosis.    Visits this year: 20 (under new insurance)    Subjective   Patient reports increased  soreness in her lower back and gluteal region BL. She notes improvement in her headaches since she stopped taking Tylenol. She was usually taking 3,000 mg of Tylenol daily, and has stopped this completely. She still has frequent migraine headaches.     HPI - 10/05/23 (CR): the patient presents today per the referral of Dr. Suárez and pain management for evaluation and and management of chronic full spine complaints. She has in the past been diagnosed with fibromyalgia. She has dealt with pain for several years and has received chiropractic care in the past. She did have mixed results with chiropractic care in the past, reporting a variation of instrument assisted manipulation and manual manipulation. But she wished to attend chiropractic care again through a hospital-based provider. She is under the care of pain management and will be starting infusions next week. She is also on the wait list for infusions at [facility] if needed. Overall, her pain levels remain moderate to severe. Her pain is constant. Symptoms in the lower back and hips seem to be the area of most intensity. But she does continue to experience pain throughout the spine. She experiences paresthesias in the upper and lower extremities bilaterally. She has difficulty finding any comfortable positions     Review of Systems   Constitutional:  Positive for fatigue. Negative for fever.   Eyes:  Negative for visual disturbance.   Respiratory:  Negative for shortness of breath.    Cardiovascular:  Negative for chest pain.   Gastrointestinal:  Negative for diarrhea, nausea and vomiting.   Genitourinary:  Negative for difficulty urinating and dysuria.   Skin:  Negative for color change.   Neurological:  Negative for dizziness.   Psychiatric/Behavioral:  Negative for agitation.    All other systems reviewed and are negative.    Objective   Examination findings (e.g., palpation & ROM): Decreased C/S and L/S ROM with pain, hypertonic and tender cervical  and lumbar erectors, BL upper trapezius    Segmental joint dysfunction was identified in the following areas using motion palpation and/or pain provocation assessment:  Cervical: 2  Thoracic: 5-7  Lumbopelvic: 1-3, BL SIJ      Physical Exam  Neurological:      General: No focal deficit present.      Mental Status: She is alert.      Motor: Motor function is intact.      Coordination: Coordination is intact.      Gait: Gait is intact.      Deep Tendon Reflexes: Reflexes are normal and symmetric.      Reflex Scores:       Tricep reflexes are 2+ on the right side and 2+ on the left side.       Bicep reflexes are 2+ on the right side and 2+ on the left side.       Brachioradialis reflexes are 2+ on the right side and 2+ on the left side.       Patellar reflexes are 2+ on the right side and 2+ on the left side.       Achilles reflexes are 2+ on the right side and 2+ on the left side.     Comments: Cam Mosqueda's BL  9/3/24       Plan   Today's treatment:  SMT to regions of segmental dysfunction identified on exam, using age-appropriate force, and manual diversified technique.   Mobilization on CS  STM to patient tolerance to hypertonic paraspinal muscles, upper trapezius  Manual dynamic stretching of the periscapular mm BL  9:01 to 9:17 PM  Patient noted improved mobility and reduced pain post-treatment    Treatment Plan:   The patient and I discussed the risks and benefits of chiropractic care. Based on the patient's subjective complaints along with the examination findings, it is advised that a course of chiropractic treatment be initiated. The patient provided consent for care. The patient tolerated today's treatment with little or no additional discomfort and was instructed to contact the office for questions or concerns. Will see patient once per week then every 2 weeks when symptoms become mild/manageable, further spaced apart contingent upon improvement.     This chart note was generated using dictation software,  and as such, there may be typographical errors present. Abbreviations: Cervical spine (CS), cervical-thoracic (CT), Dry needling (DN), Flexion adduction internal rotation (FAIR), high velocity, low amplitude (HVLA), Lumbar spine (LS), Soft tissue manipulation (STM), spinal manipulative therapy (SMT), Straight leg raise (SLR), Thoracic spine (TS).

## 2024-12-04 NOTE — PROGRESS NOTES
Assessment/Plan   Today's presentation is consistent with fibromyalgia/myofascial pain syndrome alongside postural strain and somatic dysfunction contributing to her pain syndrome, also has been diagnosed with inflammatory arthritis (not rheumatoid), PCOS, and has had elevated ESR/CRP and been under rheumatologic care.  Complicating factors include chronicity of symptoms as well as body habitus.  We will implement a trial of care to include various manipulative techniques which will range from instrument assisted to diversified.  We will utilize soft tissue techniques such as active release technique to the cervical spine musculature.  We will closely monitor their response to care to determine if any changes need to occur, if advanced imaging is needed, or if referral to other providers is appropriate. She will also be receiving acupuncture and integrative medicine consultation which is appropriate when considering her overall presentation     Full spine EOS radiographs 2024 - 12 degree dextroconvex scoliosis from T11 to L3. Hyperkyphosis in TS and hyperlordosis in LS    Brain MRI 2024 - IMPRESSION:  1. No MR evidence of acute intracranial infarct, hemorrhage, mass, or  mass effect.  2. Nonspecific 6 mm white matter FLAIR signal hyperintensity adjacent  to the trigone of the right lateral ventricle, which may be  indicative of minimal infectious inflammatory change, migraine  disorder, demyelinating disease, or vasculitis for instance in the  appropriate clinical context. Consider follow-up exam if clinically  indicated.    LS radiographs 2024 - IMPRESSION:  No acute pathologic findings are identified.    CS radiographs 2024 - IMPRESSION:  No pathologic findings are identified.    TS radiographs 2024 - IMPRESSION:  No acute pathologic findings are identified.  Thoracic lumbar dextroscoliosis.  Lower thoracic mild spondylosis.    Visits this year: 20 (under new insurance)    Subjective   Patient reports increased  soreness in her lower back and gluteal region BL. She notes improvement in her headaches since she stopped taking Tylenol. She was usually taking 3,000 mg of Tylenol daily, and has stopped this completely. She still has frequent migraine headaches.     HPI - 10/05/23 (CR): the patient presents today per the referral of Dr. Suárez and pain management for evaluation and and management of chronic full spine complaints. She has in the past been diagnosed with fibromyalgia. She has dealt with pain for several years and has received chiropractic care in the past. She did have mixed results with chiropractic care in the past, reporting a variation of instrument assisted manipulation and manual manipulation. But she wished to attend chiropractic care again through a hospital-based provider. She is under the care of pain management and will be starting infusions next week. She is also on the wait list for infusions at [facility] if needed. Overall, her pain levels remain moderate to severe. Her pain is constant. Symptoms in the lower back and hips seem to be the area of most intensity. But she does continue to experience pain throughout the spine. She experiences paresthesias in the upper and lower extremities bilaterally. She has difficulty finding any comfortable positions     Review of Systems   Constitutional:  Positive for fatigue. Negative for fever.   Eyes:  Negative for visual disturbance.   Respiratory:  Negative for shortness of breath.    Cardiovascular:  Negative for chest pain.   Gastrointestinal:  Negative for diarrhea, nausea and vomiting.   Genitourinary:  Negative for difficulty urinating and dysuria.   Skin:  Negative for color change.   Neurological:  Negative for dizziness.   Psychiatric/Behavioral:  Negative for agitation.    All other systems reviewed and are negative.    Objective   Examination findings (e.g., palpation & ROM): Decreased C/S and L/S ROM with pain, hypertonic and tender cervical  and lumbar erectors, BL upper trapezius    Segmental joint dysfunction was identified in the following areas using motion palpation and/or pain provocation assessment:  Cervical: 2  Thoracic: 5-7  Lumbopelvic: 1-3, BL SIJ      Physical Exam  Neurological:      General: No focal deficit present.      Mental Status: She is alert.      Motor: Motor function is intact.      Coordination: Coordination is intact.      Gait: Gait is intact.      Deep Tendon Reflexes: Reflexes are normal and symmetric.      Reflex Scores:       Tricep reflexes are 2+ on the right side and 2+ on the left side.       Bicep reflexes are 2+ on the right side and 2+ on the left side.       Brachioradialis reflexes are 2+ on the right side and 2+ on the left side.       Patellar reflexes are 2+ on the right side and 2+ on the left side.       Achilles reflexes are 2+ on the right side and 2+ on the left side.     Comments: Cam Mosqueda's BL  9/3/24       Plan   Today's treatment:  SMT to regions of segmental dysfunction identified on exam, using age-appropriate force, and manual diversified technique.   Mobilization on CS  STM to patient tolerance to hypertonic paraspinal muscles, upper trapezius  Manual dynamic stretching of the periscapular mm BL  9:01 to 9:17 PM  Patient noted improved mobility and reduced pain post-treatment    Treatment Plan:   The patient and I discussed the risks and benefits of chiropractic care. Based on the patient's subjective complaints along with the examination findings, it is advised that a course of chiropractic treatment be initiated. The patient provided consent for care. The patient tolerated today's treatment with little or no additional discomfort and was instructed to contact the office for questions or concerns. Will see patient once per week then every 2 weeks when symptoms become mild/manageable, further spaced apart contingent upon improvement.     This chart note was generated using dictation software,  and as such, there may be typographical errors present. Abbreviations: Cervical spine (CS), cervical-thoracic (CT), Dry needling (DN), Flexion adduction internal rotation (FAIR), high velocity, low amplitude (HVLA), Lumbar spine (LS), Soft tissue manipulation (STM), spinal manipulative therapy (SMT), Straight leg raise (SLR), Thoracic spine (TS).

## 2024-12-05 ENCOUNTER — APPOINTMENT (OUTPATIENT)
Dept: INTEGRATIVE MEDICINE | Facility: CLINIC | Age: 34
End: 2024-12-05
Payer: COMMERCIAL

## 2024-12-05 DIAGNOSIS — M99.02 SOMATIC DYSFUNCTION OF THORACIC REGION: ICD-10-CM

## 2024-12-05 DIAGNOSIS — M99.03 SOMATIC DYSFUNCTION OF LUMBAR REGION: Primary | ICD-10-CM

## 2024-12-05 DIAGNOSIS — M79.10 MYALGIA: ICD-10-CM

## 2024-12-05 DIAGNOSIS — M54.2 NECK PAIN: ICD-10-CM

## 2024-12-05 DIAGNOSIS — G89.29 CHRONIC LOW BACK PAIN WITH SCIATICA, SCIATICA LATERALITY UNSPECIFIED, UNSPECIFIED BACK PAIN LATERALITY: ICD-10-CM

## 2024-12-05 DIAGNOSIS — M99.01 SOMATIC DYSFUNCTION OF CERVICAL REGION: ICD-10-CM

## 2024-12-05 DIAGNOSIS — M54.40 CHRONIC LOW BACK PAIN WITH SCIATICA, SCIATICA LATERALITY UNSPECIFIED, UNSPECIFIED BACK PAIN LATERALITY: ICD-10-CM

## 2024-12-05 DIAGNOSIS — M99.04 SACROILIAC JOINT SOMATIC DYSFUNCTION: ICD-10-CM

## 2024-12-05 PROCEDURE — 98941 CHIROPRACT MANJ 3-4 REGIONS: CPT | Performed by: CHIROPRACTOR

## 2024-12-05 PROCEDURE — 97112 NEUROMUSCULAR REEDUCATION: CPT | Performed by: CHIROPRACTOR

## 2024-12-06 ENCOUNTER — TELEPHONE (OUTPATIENT)
Dept: PHARMACY | Facility: HOSPITAL | Age: 34
End: 2024-12-06
Payer: COMMERCIAL

## 2024-12-06 DIAGNOSIS — E88.819 INSULIN RESISTANCE: ICD-10-CM

## 2024-12-06 RX ORDER — TIRZEPATIDE 2.5 MG/.5ML
2.5 INJECTION, SOLUTION SUBCUTANEOUS
Qty: 4 EACH | Refills: 3 | Status: SHIPPED | OUTPATIENT
Start: 2024-12-06 | End: 2024-12-06 | Stop reason: SDUPTHER

## 2024-12-06 RX ORDER — TIRZEPATIDE 2.5 MG/.5ML
2.5 INJECTION, SOLUTION SUBCUTANEOUS
Qty: 4 EACH | Refills: 11 | Status: SHIPPED | OUTPATIENT
Start: 2024-12-06

## 2024-12-06 NOTE — TELEPHONE ENCOUNTER
Patient emailed that she would like to return to the Zepbound 2.5mg dose, because the 5mg is not making her feel well and she feels she is eating too little while on it.     I am sending an updated rx for Zepbound 2.5mg    Briseida Polanco, Pharm.D.

## 2024-12-10 ENCOUNTER — APPOINTMENT (OUTPATIENT)
Dept: INTEGRATIVE MEDICINE | Facility: CLINIC | Age: 34
End: 2024-12-10
Payer: COMMERCIAL

## 2024-12-10 ENCOUNTER — APPOINTMENT (OUTPATIENT)
Dept: NEUROLOGY | Facility: CLINIC | Age: 34
End: 2024-12-10
Payer: COMMERCIAL

## 2024-12-10 ENCOUNTER — INFUSION (OUTPATIENT)
Dept: INFUSION THERAPY | Facility: CLINIC | Age: 34
End: 2024-12-10
Payer: COMMERCIAL

## 2024-12-10 VITALS
TEMPERATURE: 97.3 F | RESPIRATION RATE: 18 BRPM | OXYGEN SATURATION: 95 % | DIASTOLIC BLOOD PRESSURE: 67 MMHG | HEART RATE: 79 BPM | SYSTOLIC BLOOD PRESSURE: 121 MMHG

## 2024-12-10 DIAGNOSIS — M79.7 FIBROMYALGIA: Primary | ICD-10-CM

## 2024-12-10 LAB — PREGNANCY TEST URINE, POC: NEGATIVE

## 2024-12-10 PROCEDURE — 2500000004 HC RX 250 GENERAL PHARMACY W/ HCPCS (ALT 636 FOR OP/ED): Performed by: NURSE PRACTITIONER

## 2024-12-10 PROCEDURE — 81025 URINE PREGNANCY TEST: CPT

## 2024-12-10 PROCEDURE — 96375 TX/PRO/DX INJ NEW DRUG ADDON: CPT | Mod: INF

## 2024-12-10 PROCEDURE — 96365 THER/PROPH/DIAG IV INF INIT: CPT | Mod: INF

## 2024-12-10 PROCEDURE — 96368 THER/DIAG CONCURRENT INF: CPT | Mod: INF

## 2024-12-10 RX ORDER — EPINEPHRINE 0.3 MG/.3ML
0.3 INJECTION SUBCUTANEOUS EVERY 5 MIN PRN
OUTPATIENT
Start: 2024-12-24

## 2024-12-10 RX ORDER — DIPHENHYDRAMINE HYDROCHLORIDE 50 MG/ML
50 INJECTION INTRAMUSCULAR; INTRAVENOUS AS NEEDED
OUTPATIENT
Start: 2024-12-24

## 2024-12-10 RX ORDER — METOPROLOL TARTRATE 25 MG/1
25 TABLET, FILM COATED ORAL ONCE
OUTPATIENT
Start: 2024-12-24 | End: 2024-12-24

## 2024-12-10 RX ORDER — KETAMINE HCL IN NACL, ISO-OSM 100MG/10ML
SYRINGE (ML) INJECTION ONCE
Status: CANCELLED | OUTPATIENT
Start: 2024-12-24

## 2024-12-10 RX ORDER — KETAMINE HCL IN NACL, ISO-OSM 100MG/10ML
SYRINGE (ML) INJECTION ONCE
Status: COMPLETED | OUTPATIENT
Start: 2024-12-10 | End: 2024-12-10

## 2024-12-10 RX ORDER — NITROGLYCERIN 0.4 MG/1
0.4 TABLET SUBLINGUAL ONCE
OUTPATIENT
Start: 2024-12-24 | End: 2024-12-24

## 2024-12-10 RX ORDER — FAMOTIDINE 10 MG/ML
20 INJECTION INTRAVENOUS ONCE AS NEEDED
OUTPATIENT
Start: 2024-12-24

## 2024-12-10 RX ORDER — METOCLOPRAMIDE HYDROCHLORIDE 5 MG/ML
10 INJECTION INTRAMUSCULAR; INTRAVENOUS ONCE
OUTPATIENT
Start: 2024-12-24 | End: 2024-12-24

## 2024-12-10 RX ORDER — ONDANSETRON HYDROCHLORIDE 2 MG/ML
4 INJECTION, SOLUTION INTRAVENOUS ONCE
OUTPATIENT
Start: 2024-12-24 | End: 2024-12-24

## 2024-12-10 RX ORDER — DIPHENHYDRAMINE HYDROCHLORIDE 50 MG/ML
25 INJECTION INTRAMUSCULAR; INTRAVENOUS ONCE
OUTPATIENT
Start: 2024-12-24 | End: 2024-12-24

## 2024-12-10 RX ORDER — KETOROLAC TROMETHAMINE 30 MG/ML
30 INJECTION, SOLUTION INTRAMUSCULAR; INTRAVENOUS ONCE
Status: CANCELLED | OUTPATIENT
Start: 2024-12-24 | End: 2024-12-24

## 2024-12-10 RX ORDER — KETOROLAC TROMETHAMINE 30 MG/ML
30 INJECTION, SOLUTION INTRAMUSCULAR; INTRAVENOUS ONCE
Status: COMPLETED | OUTPATIENT
Start: 2024-12-10 | End: 2024-12-10

## 2024-12-10 RX ORDER — ALBUTEROL SULFATE 0.83 MG/ML
3 SOLUTION RESPIRATORY (INHALATION) AS NEEDED
OUTPATIENT
Start: 2024-12-24

## 2024-12-10 ASSESSMENT — PAIN SCALES - GENERAL
PAINLEVEL_OUTOF10: 6
PAINLEVEL_OUTOF10: 2

## 2024-12-10 ASSESSMENT — COLUMBIA-SUICIDE SEVERITY RATING SCALE - C-SSRS
6. HAVE YOU EVER DONE ANYTHING, STARTED TO DO ANYTHING, OR PREPARED TO DO ANYTHING TO END YOUR LIFE?: NO
6. HAVE YOU EVER DONE ANYTHING, STARTED TO DO ANYTHING, OR PREPARED TO DO ANYTHING TO END YOUR LIFE?: NO
2. HAVE YOU ACTUALLY HAD ANY THOUGHTS OF KILLING YOURSELF?: NO
1. IN THE PAST MONTH, HAVE YOU WISHED YOU WERE DEAD OR WISHED YOU COULD GO TO SLEEP AND NOT WAKE UP?: NO

## 2024-12-10 ASSESSMENT — ENCOUNTER SYMPTOMS
DEPRESSION: 0
OCCASIONAL FEELINGS OF UNSTEADINESS: 0
LOSS OF SENSATION IN FEET: 1

## 2024-12-10 NOTE — PATIENT INSTRUCTIONS
Today :We administered ketorolac.     For:   1. Fibromyalgia         Your next appointment is due in:  SEE AVS        Please read the  Medication Guide that was given to you and reviewed during todays visit.     (Tell all doctors including dentists that you are taking this medication)     Go to the emergency room or call 911 if:  -You have signs of allergic reaction:   -Rash, hives, itching.   -Swollen, blistered, peeling skin.   -Swelling of face, lips, mouth, tongue or throat.   -Tightness of chest, trouble breathing, swallowing or talking     Call your doctor:  - If IV / injection site gets red, warm, swollen, itchy or leaks fluid or pus.     (Leave dressing on your IV site for at least 2 hours and keep area clean and dry  - If you get sick or have symptoms of infection or are not feeling well for any reason.    (Wash your hands often, stay away from people who are sick)  - If you have side effects from your medication that do not go away or are bothersome.     (Refer to the teaching your nurse gave you for side effects to call your doctor about)    - Common side effects may include:  stuffy nose, headache, feeling tired, muscle aches, upset stomach  - Before receiving any vaccines     - Call [facility] at   If:  - You get sick, are on antibiotics, have had a recent vaccine, have surgery or dental work and your doctor wants your visit rescheduled.  - You need to cancel and reschedule your visit for any reason. Call at least 2 days before your visit if you need to cancel.   - Your insurance changes before your next visit.    (We will need to get approval from your new insurance. This can take up to two weeks.)     The [facility] is opened Monday thru Friday. We are closed on weekends and holidays.   Voice mail will take your call if the center is closed. If you leave a message please allow 24 hours for a call back during weekdays. If you leave a message on a weekend/holiday,  we will call you back the next business day.    A pharmacist is available Monday - Friday from 8:30AM to 3:30PM to help answer any questions you may have about your prescriptions(s). Please call pharmacy at:    [facility]: [phone]  [facility]: [phone]  [facility]: [phone]              [facility]      Pain Infusion Aftercare Instructions      1. It is normal to feel sedated, tired and low in energy after a pain infusion. DO NOT DRIVE, OPERATE ANY MACHINERY, OR MAKE ANY IMPORTANT DECISIONS FOR AT LEAST 24 HOURS AFTER THE INFUSION.     2. Call the pain center at [phone] with any problems, questions, or concerns.     3. Eat light after the infusion. If you feel queasy or sick to your stomach, laying down with your eyes closed may help. When you resume eating start with something mild like clear liquids, yogurt, applesauce, crackers, etc… Gradually advance to a regular diet.     4. Do not leave your house alone the evening of your pain infusion.     5. No alcohol or sedative medications, such as sleeping pills, for 24 hours after your pain infusion.     6. Resume all other prescribed medications unless directed otherwise by you physician.     7. If you have any medical emergencies, call 911 or go directly to the closest emergency room.

## 2024-12-11 ENCOUNTER — TELEPHONE (OUTPATIENT)
Dept: PAIN MEDICINE | Facility: CLINIC | Age: 34
End: 2024-12-11
Payer: COMMERCIAL

## 2024-12-11 DIAGNOSIS — M79.7 FIBROMYALGIA: ICD-10-CM

## 2024-12-11 DIAGNOSIS — G43.119 INTRACTABLE MIGRAINE WITH AURA WITHOUT STATUS MIGRAINOSUS: ICD-10-CM

## 2024-12-11 DIAGNOSIS — G89.29 CHRONIC NECK PAIN: ICD-10-CM

## 2024-12-11 DIAGNOSIS — M54.59 OTHER LOW BACK PAIN: ICD-10-CM

## 2024-12-11 DIAGNOSIS — M54.2 CHRONIC NECK PAIN: ICD-10-CM

## 2024-12-11 RX ORDER — GABAPENTIN 300 MG/1
300 CAPSULE ORAL 3 TIMES DAILY
Qty: 270 CAPSULE | Refills: 0 | Status: SHIPPED | OUTPATIENT
Start: 2024-12-11 | End: 2025-03-11

## 2024-12-12 ENCOUNTER — HOSPITAL ENCOUNTER (OUTPATIENT)
Dept: GASTROENTEROLOGY | Facility: HOSPITAL | Age: 34
Discharge: HOME | End: 2024-12-12
Payer: COMMERCIAL

## 2024-12-12 ENCOUNTER — ANESTHESIA EVENT (OUTPATIENT)
Dept: GASTROENTEROLOGY | Facility: HOSPITAL | Age: 34
End: 2024-12-12
Payer: COMMERCIAL

## 2024-12-12 ENCOUNTER — ANESTHESIA (OUTPATIENT)
Dept: GASTROENTEROLOGY | Facility: HOSPITAL | Age: 34
End: 2024-12-12
Payer: COMMERCIAL

## 2024-12-12 ENCOUNTER — APPOINTMENT (OUTPATIENT)
Dept: INTEGRATIVE MEDICINE | Facility: CLINIC | Age: 34
End: 2024-12-12
Payer: COMMERCIAL

## 2024-12-12 VITALS
HEART RATE: 85 BPM | DIASTOLIC BLOOD PRESSURE: 84 MMHG | SYSTOLIC BLOOD PRESSURE: 155 MMHG | RESPIRATION RATE: 18 BRPM | HEIGHT: 60 IN | TEMPERATURE: 97.3 F | OXYGEN SATURATION: 98 % | BODY MASS INDEX: 52.8 KG/M2 | WEIGHT: 268.96 LBS

## 2024-12-12 DIAGNOSIS — K92.1 HEMATOCHEZIA: ICD-10-CM

## 2024-12-12 DIAGNOSIS — K52.9 CHRONIC DIARRHEA: ICD-10-CM

## 2024-12-12 LAB — PREGNANCY TEST URINE, POC: NEGATIVE

## 2024-12-12 PROCEDURE — 3700000001 HC GENERAL ANESTHESIA TIME - INITIAL BASE CHARGE

## 2024-12-12 PROCEDURE — A45385 PR COLONOSCOPY,REMV LESN,SNARE

## 2024-12-12 PROCEDURE — A45385 PR COLONOSCOPY,REMV LESN,SNARE: Performed by: ANESTHESIOLOGY

## 2024-12-12 PROCEDURE — 2500000004 HC RX 250 GENERAL PHARMACY W/ HCPCS (ALT 636 FOR OP/ED)

## 2024-12-12 PROCEDURE — 45380 COLONOSCOPY AND BIOPSY: CPT | Performed by: STUDENT IN AN ORGANIZED HEALTH CARE EDUCATION/TRAINING PROGRAM

## 2024-12-12 PROCEDURE — 7100000010 HC PHASE TWO TIME - EACH INCREMENTAL 1 MINUTE

## 2024-12-12 PROCEDURE — 3700000002 HC GENERAL ANESTHESIA TIME - EACH INCREMENTAL 1 MINUTE

## 2024-12-12 PROCEDURE — 81025 URINE PREGNANCY TEST: CPT | Performed by: STUDENT IN AN ORGANIZED HEALTH CARE EDUCATION/TRAINING PROGRAM

## 2024-12-12 PROCEDURE — 7100000009 HC PHASE TWO TIME - INITIAL BASE CHARGE

## 2024-12-12 RX ORDER — ONDANSETRON HYDROCHLORIDE 2 MG/ML
4 INJECTION, SOLUTION INTRAVENOUS ONCE AS NEEDED
Status: DISCONTINUED | OUTPATIENT
Start: 2024-12-12 | End: 2024-12-13 | Stop reason: HOSPADM

## 2024-12-12 RX ORDER — LIDOCAINE HCL/PF 100 MG/5ML
SYRINGE (ML) INTRAVENOUS AS NEEDED
Status: DISCONTINUED | OUTPATIENT
Start: 2024-12-12 | End: 2024-12-12

## 2024-12-12 RX ORDER — DIVALPROEX SODIUM 250 MG/1
250 TABLET, FILM COATED, EXTENDED RELEASE ORAL DAILY
Qty: 90 TABLET | Refills: 0 | Status: SHIPPED | OUTPATIENT
Start: 2024-12-12

## 2024-12-12 RX ORDER — PROPOFOL 10 MG/ML
INJECTION, EMULSION INTRAVENOUS AS NEEDED
Status: DISCONTINUED | OUTPATIENT
Start: 2024-12-12 | End: 2024-12-12

## 2024-12-12 RX ORDER — SODIUM CHLORIDE, SODIUM LACTATE, POTASSIUM CHLORIDE, CALCIUM CHLORIDE 600; 310; 30; 20 MG/100ML; MG/100ML; MG/100ML; MG/100ML
20 INJECTION, SOLUTION INTRAVENOUS CONTINUOUS
Status: DISCONTINUED | OUTPATIENT
Start: 2024-12-12 | End: 2024-12-13 | Stop reason: HOSPADM

## 2024-12-12 SDOH — HEALTH STABILITY: MENTAL HEALTH: CURRENT SMOKER: 0

## 2024-12-12 ASSESSMENT — COLUMBIA-SUICIDE SEVERITY RATING SCALE - C-SSRS
1. IN THE PAST MONTH, HAVE YOU WISHED YOU WERE DEAD OR WISHED YOU COULD GO TO SLEEP AND NOT WAKE UP?: NO
6. HAVE YOU EVER DONE ANYTHING, STARTED TO DO ANYTHING, OR PREPARED TO DO ANYTHING TO END YOUR LIFE?: NO
6. HAVE YOU EVER DONE ANYTHING, STARTED TO DO ANYTHING, OR PREPARED TO DO ANYTHING TO END YOUR LIFE?: NO
2. HAVE YOU ACTUALLY HAD ANY THOUGHTS OF KILLING YOURSELF?: NO
1. IN THE PAST MONTH, HAVE YOU WISHED YOU WERE DEAD OR WISHED YOU COULD GO TO SLEEP AND NOT WAKE UP?: NO
6. HAVE YOU EVER DONE ANYTHING, STARTED TO DO ANYTHING, OR PREPARED TO DO ANYTHING TO END YOUR LIFE?: NO
6. HAVE YOU EVER DONE ANYTHING, STARTED TO DO ANYTHING, OR PREPARED TO DO ANYTHING TO END YOUR LIFE?: NO
2. HAVE YOU ACTUALLY HAD ANY THOUGHTS OF KILLING YOURSELF?: NO

## 2024-12-12 ASSESSMENT — PAIN SCALES - GENERAL
PAINLEVEL_OUTOF10: 0 - NO PAIN
PAINLEVEL_OUTOF10: 0 - NO PAIN
PAINLEVEL_OUTOF10: 3
PAINLEVEL_OUTOF10: 0 - NO PAIN
PAINLEVEL_OUTOF10: 0 - NO PAIN

## 2024-12-12 ASSESSMENT — PAIN - FUNCTIONAL ASSESSMENT
PAIN_FUNCTIONAL_ASSESSMENT: 0-10
PAIN_FUNCTIONAL_ASSESSMENT: 0-10

## 2024-12-12 ASSESSMENT — PAIN DESCRIPTION - DESCRIPTORS: DESCRIPTORS: ACHING

## 2024-12-12 NOTE — ANESTHESIA PREPROCEDURE EVALUATION
Patient: Sasha Longoria    Procedure Information       Date/Time: 12/12/24 1250    Scheduled providers: Jose Lua MD; Ignacio Duran MD    Procedure: COLONOSCOPY    Location: [facility]            Relevant Problems   Anesthesia   (-) Difficult intubation   (-) PONV (postoperative nausea and vomiting)      Neuro   (+) Bilateral carpal tunnel syndrome   (+) Chronic daily headache   (+) Depression   (+) Dysthymic disorder   (+) Generalized anxiety disorder   (+) PTSD (post-traumatic stress disorder)   (+) Recurrent major depressive disorder, in remission (CMS-HCC)      GI   (+) Esophageal reflux   (+) Gastroesophageal reflux disease      Endocrine   (+) Severe obesity (Multi)      Musculoskeletal   (+) Bilateral carpal tunnel syndrome   (+) Chronic low back pain with sciatica   (+) Chronic neck pain   (+) Fibromyalgia      ID   (+) Recurrent streptococcal tonsillitis   (+) Streptococcal tonsillitis       Clinical information reviewed:    Allergies  Meds     OB Status           NPO Detail:  NPO/Void Status  Date of Last Liquid: 12/12/24  Time of Last Liquid: 1200  Date of Last Solid: 12/11/24  Time of Last Solid: 0800         Physical Exam    Airway  Mallampati: III  TM distance: >3 FB  Neck ROM: full     Cardiovascular - normal exam  Rhythm: regular  Rate: normal     Dental    Pulmonary - normal exam     Abdominal            Anesthesia Plan    History of general anesthesia?: yes  History of complications of general anesthesia?: no    ASA 3     MAC     The patient is not a current smoker.  Education provided regarding risk of obstructive sleep apnea.  intravenous induction   Anesthetic plan and risks discussed with patient.    Plan discussed with CRNA, CAA and attending.

## 2024-12-12 NOTE — ANESTHESIA POSTPROCEDURE EVALUATION
Patient: Sasha Longoria    Procedure Summary       Date: 12/12/24 Room / Location: [facility]    Anesthesia Start: 1403 Anesthesia Stop:     Procedure: COLONOSCOPY Diagnosis:       Chronic diarrhea      Hematochezia    Scheduled Providers: Jose Lua MD; Ignacio Duran MD Responsible Provider: Eric Lam MD    Anesthesia Type: MAC ASA Status: 3            Anesthesia Type: MAC    Vitals Value Taken Time   /58 12/12/24 1443   Temp 36.3 12/12/24 1443   Pulse 94 12/12/24 1443   Resp 14 12/12/24 1443   SpO2 98 12/12/24 1443       Anesthesia Post Evaluation    Patient location during evaluation: PACU  Patient participation: complete - patient participated  Level of consciousness: awake and alert  Pain management: adequate  Airway patency: patent  Cardiovascular status: acceptable  Respiratory status: acceptable  Hydration status: acceptable  Postoperative Nausea and Vomiting: none      There were no known notable events for this encounter.

## 2024-12-12 NOTE — DISCHARGE INSTRUCTIONS

## 2024-12-16 DIAGNOSIS — E88.819 INSULIN RESISTANCE: ICD-10-CM

## 2024-12-16 RX ORDER — TIRZEPATIDE 2.5 MG/.5ML
2.5 INJECTION, SOLUTION SUBCUTANEOUS
Qty: 4 EACH | Refills: 11 | Status: SHIPPED | OUTPATIENT
Start: 2024-12-16

## 2024-12-16 NOTE — PROGRESS NOTES
"  Patient ID: Sasha Longoria is a 34 y.o. female who presents for No chief complaint on file..    Referring Provider: STEPHANIE Noriega    Pt was referred for weight managment, insulin resistance, interested in Continuous glucose monitor   Last visit with referring provider: 3/1/24    Subjective     Preferred Pharmacy:    [facility] - [place], [place] - [address]  [address] [facility] [address]  Phone: [phone] Fax: [phone]    [facility] - [facility] [address]  [address]  [facility] [address]  Phone: [phone] Fax: [phone]    [id number] - [place], [place] - [address] AT INTERSECTION OF [address] [address]  Phone: [phone] Fax: [phone]    [facility] [address] [place] [facility] - [place], [place] - [address]  [address] [place] [id number]  Phone: [phone] Fax: [phone]      Adherence:   Do you have copays on your medications? Yes  Do you have trouble affording your current medications? No, but in \"limb\" was laid off, still looking for a job. Has previous employers insurance currently through [employer] (has ~2 more months remaining).   How many doses of medication did you miss in the last 7 days? 1 dose, morning most often, so many pills, gets distracted and forgets to come back to take all meds.     Subjective/Objective:      Patient identified goal:   Increase endurance (VO2 Max is \"terrible\")  Energy to do ADL, regaining independence  More active, without needing a week of recovery time  More comfortable physically  \"A few years ago\" was at 147 lbs and felt good, today desires to be at a size that is healthy and manageable to maintain.     Onset:   When fibromyalgia was triggered, fall of 2020.   Lifetime of chronic stress and trauma. Traumatic event occurred at this time.   Used to be active, exercising 6 days/week, noticed she couldn't do what she usually could with weight " "training.     Past success:   X 2 years exercised 6 d/week with an active rest day along with caloric restriction.     Support network:   Partner  Therapist  Friends    How long implementing diet/lifestyle change for weight loss:   3 months this time, previously x 2 years.     Disordered eating patterns:   Binge eating disorder without purging - not current- when seeking comfort    Concurrent chronic conditions:   Lab Results   Component Value Date    CHHDL 3.4 11/07/2024    TRIG 82 11/07/2024       Prediabetes/Diabetes? Insulin resistance (TAURUS-IR 4.4)    Pertinent PMH Review:   PMH of Pancreatitis: No  PMH of Gallbladder disease: No  PMH of Delayed Gastric Emptying:  Unsure, hx of stomach cramps/pains/gassy/bloating  PMH of MTC: No  PMH of Retinopathy: No, visits eye doctor regularly because of \"potential retinal detachment in one eye\"   PMH of Urinary Tract Infections: No  PMH of Suicidal Ideation: Yes, past.   Female on oral contraceptive? IUD    Migraines? Yes, 12-13 years ago.   Allergies? Hives/Rash with intensive immune response, has had immune panels that came back negative. Sensitive skin, dermatitis.     Anxiety? Yes    Thyroid health:   Lab Results   Component Value Date    TSH 2.42 11/07/2024        Medications potentially contributing to weight gain:   Antipsychotics/Mood stabilizers/Antiepileptics/Nerve pain: gabapentin,      Medications tried/stopped for weight management:     None, past provider tried Wegovy but insurance denied because also on topiramate.     HPI    Objective     There were no vitals taken for this visit.     Labs  Lab Results   Component Value Date    BILITOT 0.4 11/07/2024    CALCIUM 8.8 11/07/2024    CO2 32 11/07/2024     11/07/2024    CREATININE 0.82 11/07/2024    GLUCOSE 74 11/07/2024    ALKPHOS 77 11/07/2024    K 4.4 11/07/2024    PROT 6.0 (L) 11/07/2024     11/07/2024    AST 12 11/07/2024    ALT 16 11/07/2024    BUN 11 11/07/2024    ANIONGAP 10 11/07/2024    " "ALBUMIN 4.0 11/07/2024     Lab Results   Component Value Date    TRIG 82 11/07/2024    CHOL 139 11/07/2024    LDLCALC 82 11/07/2024    HDL 40.4 11/07/2024     Lab Results   Component Value Date    HGBA1C 4.6 11/07/2024    HGBA1C 5.1 02/29/2024    HGBA1C 5.1 02/20/2024     The ASCVD Risk score (Yesenia DK, et al., 2019) failed to calculate for the following reasons:    The 2019 ASCVD risk score is only valid for ages 40 to 79  No results found for: \"VITD25\"    Current Outpatient Medications on File Prior to Visit   Medication Sig Dispense Refill    acetaminophen (Tylenol 8 HOUR) 650 mg ER tablet Take 1 tablet (650 mg) by mouth every 8 hours if needed for mild pain (1 - 3). Do not crush, chew, or split. Patient takes daily, up to 3000mg/day.      alpha lipoic acid 600 mg capsule Take by mouth. 1 capsule daily after meal 1 week  Then 2 capsules for 1 week  Then 3 capsules for 1 week  Then 4 capsules daily.      buPROPion XL (Wellbutrin XL) 300 mg 24 hr tablet TAKE 1 TABLET BY MOUTH EVERY DAY IN THE MORNING AS DIRECTED      cholecalciferol (Vitamin D-3) 25 MCG (1000 UT) capsule Take 2 capsules (50 mcg) by mouth once daily. (Patient taking differently: Take 1 capsule (25 mcg) by mouth once daily.)      clobetasol (Temovate) 0.05 % ointment once daily as needed.      coenzyme Q-10 (Co Q-10) 200 mg capsule Take 1 capsule (200 mg) by mouth 3 times a day.      cyanocobalamin, vitamin B-12, 1,000 mcg/mL drops Take by mouth once daily. 1 dropper per day      dihydroergotamine (Trudhesa) 0.725 mg/pump act. (4 mg/mL) nasal spray Administer 1 spray into each nostril every 1 hour if needed for migraine. Dose may be repeated in 1 hour after the first dose. No more than 2 doses within 24 hours or 3 doses within 7 days. 6 each 3    divalproex (Depakote ER) 250 mg 24 hr tablet Take 1 tablet (250 mg) by mouth once daily. 90 tablet 0    DULoxetine (Cymbalta) 60 mg DR capsule Take 1 capsule (60 mg) by mouth once daily. Do not crush or " chew.      ferrous sulfate, 325 mg ferrous sulfate, tablet Take 1 tablet (325 mg) by mouth once daily with breakfast. 48 tablet 3    gabapentin (Neurontin) 300 mg capsule Take 1 capsule (300 mg) by mouth 3 times a day. 270 capsule 0    ketoconazole (NIZOral) 2 % shampoo Lather onto scalp and let sit for 5 mins before rinsing once daily until improvement.  May decrease to once-twice weekly for maintenance.      leflunomide (Arava) 10 mg tablet       levonorgestrel (Mirena) 21 mcg/24 hours (8 yrs) 52 mg IUD by intrauterine route. (Patient not taking: Reported on 12/12/2024)      MAGNESIUM GLYCINATE ORAL Take 1 capsule by mouth once daily. (Patient not taking: Reported on 12/12/2024)      omeprazole (PriLOSEC) 40 mg DR capsule Take 1 capsule (40 mg) by mouth once daily. Do not crush or chew. 30 capsule 11    ondansetron (Zofran) 4 mg tablet Take 2 tablets (8 mg) by mouth every 8 hours if needed for nausea or vomiting. 20 tablet 1    Qulipta 60 mg tablet tablet Take 1 tablet (60 mg) by mouth once daily. 30 tablet 3    sennosides (Senokot) 8.6 mg tablet Take 1 tablet (8.6 mg) by mouth once daily. (Patient not taking: Reported on 12/12/2024) 90 tablet 3    sulfaSALAzine (Azulfidine) 500 mg DR tablet       tirzepatide, weight loss, (Zepbound) 2.5 mg/0.5 mL injection Inject 2.5 mg under the skin every 7 days. 4 each 11    triamcinolone (Kenalog) 0.1 % ointment Apply to elbows twice daily only as needed for irritation.  Do not apply more than 21 days per month.      [DISCONTINUED] divalproex (Depakote ER) 250 mg 24 hr tablet Take 1 tablet (250 mg) by mouth once daily. Do not crush, chew, or split. When initial 6 day taper is complete for ongoing daily treatment 30 tablet 2    [DISCONTINUED] gabapentin (Neurontin) 300 mg capsule Take 1 capsule (300 mg) by mouth 3 times a day. 270 capsule 0    [DISCONTINUED] sodium,potassium,mag sulfates (Suprep) 17.5-3.13-1.6 gram recon soln solution Take 1 bottle by mouth 2 times a day. Take  "one bottle twice as directed by the prep instructions 354 mL 0    [DISCONTINUED] tirzepatide, weight loss, (Zepbound) 2.5 mg/0.5 mL injection Inject 2.5 mg under the skin every 7 days. 4 each 11     No current facility-administered medications on file prior to visit.        Medication and allergy reconciliation completed     Drug Interactions       Assessment/Plan   Sasha has not yet reduced to Zepbound 2.5mg,  digestive upset was not as severe as the prior week  Experiencing food aversion. Skipping breakfast or having protein shake. Hungry enough for lunch. Dinner is \"hit or miss\", sometimes just a few bites.   We discussed this is ok, to make sure to listen to her body as far as hunger/fullness signals.   Feels her mental state isn't aligning with what she can eat, shares she grew up over eating, and it's been a \"huge adjustment\" to get used to smaller portions.   She started digestive enzymes, found two capsules to cause side effects, but one capsule has been helpful.   She has enrolled in a program provided by her insurance in order to obtain zepbound coverage, finds the program beneficial.   Weight between 261-264 lbs, has been variable. Has not noticed weight loss since starting 5mg injections.   She has 2- 5mg pens at home, and a refill of 4-2.5mg pens ready to  at [facility].   She is going to try one more week at 5mg and will let me know if she'd like to continue at 5mg or reduce to 2.5mg.   Sleep has been better and more consistent since moving.   Has more energy and motivation to get things done at home, however by late afternoon/evening still feeling fatigued, hard to do things after work.   Patient had colonoscopy last week, removed two benign polyps, no celiac, crohn's, etc.   Working with Wendy Aguilar DC for Functional Medicine  She is having mycotoxin testing done  She has prescribed a number of supplements, recommended she share this list with her medical providers as many of these are not in " epic.   Magnesium glycinate  GI Revive Powder  Vitamin B12   ADK Evail  Yeastonil  NAC  Sacro-B  Mitocore  ALA  CoQ10  Akkermansia       CONTINUE  Zepbound 5mg once weekly  **Update [facility] pharmacy is out of network, patient would prefer rx is sent [facility].     Follow-up: 1/22/25 9:20am     Time spent with pt: Total length of time 20 (minutes) of the encounter and more than 50% was spent counseling the patient.      Briseida Polanco, Pharm.D, [facility]  Clinical Pharmacist  Pharmacy Services  [phone]    Continue all meds under the continuation of care with the referring provider and clinical pharmacy team.    Verbal consent to manage patient's drug therapy was obtained from the patient and/or an individual authorized to act on behalf of a patient. They were informed they may decline to participate or withdraw from participation in pharmacy services at any time.

## 2024-12-17 ENCOUNTER — APPOINTMENT (OUTPATIENT)
Dept: NEUROLOGY | Facility: CLINIC | Age: 34
End: 2024-12-17
Payer: COMMERCIAL

## 2024-12-17 VITALS
HEART RATE: 103 BPM | RESPIRATION RATE: 18 BRPM | DIASTOLIC BLOOD PRESSURE: 89 MMHG | TEMPERATURE: 98.7 F | SYSTOLIC BLOOD PRESSURE: 129 MMHG

## 2024-12-17 DIAGNOSIS — G43.009 MIGRAINE WITHOUT AURA AND WITHOUT STATUS MIGRAINOSUS, NOT INTRACTABLE: Primary | ICD-10-CM

## 2024-12-17 PROCEDURE — 64615 CHEMODENERV MUSC MIGRAINE: CPT | Performed by: NURSE PRACTITIONER

## 2024-12-17 ASSESSMENT — PAIN SCALES - GENERAL: PAINLEVEL_OUTOF10: 3

## 2024-12-17 NOTE — PROGRESS NOTES
Patient ID: Sasha Longoria is a 34 y.o. female.    Head/Face/Jaw Botulinum Injection    Date/Time: 12/17/2024 3:29 PM    Performed by: STEPHANIE Matt  Authorized by: STEPHANIE Matt      Consent:      Consent obtained:  Verbal     Consent given by:  Patient    Procedure details:      EMG used?  No     Electrical stimulation used?  No     Diluted by:  Preservative free saline     Total units available:  200       Right frontalis:  10 units divided amongst 2 site(s)     Left frontalis:  10 units divided amongst 2 site(s)     Right :  5 units divided amongst 1 site(s)     Left :  5 units divided amongst 1 site(s)     Procerus (midline):  5 units divided amongst 1 site(s)     Right occipitalis:  15 units divided amongst 3 site(s)     Left occipitalis:  15 units divided amongst 3 site(s)     Right cervical paraspinal:  10 units divided amongst 2 site(s)     Left cervical paraspinal:  10 units divided amongst 2 site(s)     Right trapezius:  15 units divided amongst 3 site(s)     Left trapezius:  15 units divided amongst 3 site(s)     Right temporalis:  20 units divided amongst 4 site(s)     Left temporalis:  20 units divided amongst 4 site(s)         Total units injected:  155     Total units wasted:  45    Post-procedure details:      Patient tolerance of procedure:  Tolerated well, no immediate complications    Comments: Please do not rub areas for 24 hours.  No pressure above eyebrows for 24 hours.  Watch out for helmets, headlamps, headbands, goggles, or massage for 24 hours.  If there is discomfort, ice for the first 24 hour,s heat after that.  Headaches may worsen, or you may experience neck stiffness.  If this occurs use your usual headache medication or a mild anti inflammatory such as advil or aleve.  Please call if you have difficulty swallowing.

## 2024-12-20 ENCOUNTER — APPOINTMENT (OUTPATIENT)
Dept: PHARMACY | Facility: HOSPITAL | Age: 34
End: 2024-12-20
Payer: COMMERCIAL

## 2024-12-20 DIAGNOSIS — E88.819 INSULIN RESISTANCE: ICD-10-CM

## 2024-12-20 DIAGNOSIS — K21.00 GASTROESOPHAGEAL REFLUX DISEASE WITH ESOPHAGITIS, UNSPECIFIED WHETHER HEMORRHAGE: ICD-10-CM

## 2024-12-20 ASSESSMENT — ENCOUNTER SYMPTOMS
DIZZINESS: 0
NAUSEA: 0
DYSURIA: 0
DIFFICULTY URINATING: 0
VOMITING: 0
SHORTNESS OF BREATH: 0
AGITATION: 0
FATIGUE: 1
DIARRHEA: 0
COLOR CHANGE: 0
FEVER: 0

## 2024-12-20 NOTE — PROGRESS NOTES
Assessment/Plan   Today's presentation is consistent with fibromyalgia/myofascial pain syndrome alongside postural strain and somatic dysfunction contributing to her pain syndrome, also has been diagnosed with inflammatory arthritis (not rheumatoid), PCOS, and has had elevated ESR/CRP and been under rheumatologic care.  Complicating factors include chronicity of symptoms as well as body habitus.  We will implement a trial of care to include various manipulative techniques which will range from instrument assisted to diversified.  We will utilize soft tissue techniques such as active release technique to the cervical spine musculature.  We will closely monitor their response to care to determine if any changes need to occur, if advanced imaging is needed, or if referral to other providers is appropriate. She will also be receiving acupuncture and integrative medicine consultation which is appropriate when considering her overall presentation     Full spine EOS radiographs 2024 - 12 degree dextroconvex scoliosis from T11 to L3. Hyperkyphosis in TS and hyperlordosis in LS    Brain MRI 2024 - IMPRESSION:  1. No MR evidence of acute intracranial infarct, hemorrhage, mass, or  mass effect.  2. Nonspecific 6 mm white matter FLAIR signal hyperintensity adjacent  to the trigone of the right lateral ventricle, which may be  indicative of minimal infectious inflammatory change, migraine  disorder, demyelinating disease, or vasculitis for instance in the  appropriate clinical context. Consider follow-up exam if clinically  indicated.    LS radiographs 2024 - IMPRESSION:  No acute pathologic findings are identified.    CS radiographs 2024 - IMPRESSION:  No pathologic findings are identified.    TS radiographs 2024 - IMPRESSION:  No acute pathologic findings are identified.  Thoracic lumbar dextroscoliosis.  Lower thoracic mild spondylosis.    Visits this year: 21 (under new insurance)    Subjective   Patient reports recent Botox  injections for headaches when well noting that she had less discomfort during the procedure than the first time she had done.  Currently endorses moderate to severe neck pain and tightness locally and moderate intermittent mid to lower back pain and tightness.  No radiating pain numbness or tingling.  Does endorse diffuse temporal frontal headache    HPI - 10/05/23 (CR): the patient presents today per the referral of Dr. Suárez and pain management for evaluation and and management of chronic full spine complaints. She has in the past been diagnosed with fibromyalgia. She has dealt with pain for several years and has received chiropractic care in the past. She did have mixed results with chiropractic care in the past, reporting a variation of instrument assisted manipulation and manual manipulation. But she wished to attend chiropractic care again through a hospital-based provider. She is under the care of pain management and will be starting infusions next week. She is also on the wait list for infusions at [facility] if needed. Overall, her pain levels remain moderate to severe. Her pain is constant. Symptoms in the lower back and hips seem to be the area of most intensity. But she does continue to experience pain throughout the spine. She experiences paresthesias in the upper and lower extremities bilaterally. She has difficulty finding any comfortable positions     Review of Systems   Constitutional:  Positive for fatigue. Negative for fever.   Eyes:  Negative for visual disturbance.   Respiratory:  Negative for shortness of breath.    Cardiovascular:  Negative for chest pain.   Gastrointestinal:  Negative for diarrhea, nausea and vomiting.   Genitourinary:  Negative for difficulty urinating and dysuria.   Skin:  Negative for color change.   Neurological:  Negative for dizziness.   Psychiatric/Behavioral:  Negative for agitation.    All other systems reviewed and are negative.    Objective   Examination  findings (e.g., palpation & ROM): Decreased C/S and L/S ROM with pain, hypertonic and tender cervical and lumbar erectors, BL upper trapezius    Segmental joint dysfunction was identified in the following areas using motion palpation and/or pain provocation assessment:  Cervical: 2  Thoracic: 5-7  Lumbopelvic: 1-3, BL SIJ      Physical Exam  Neurological:      General: No focal deficit present.      Mental Status: She is alert.      Motor: Motor function is intact.      Coordination: Coordination is intact.      Gait: Gait is intact.      Deep Tendon Reflexes: Reflexes are normal and symmetric.      Reflex Scores:       Tricep reflexes are 2+ on the right side and 2+ on the left side.       Bicep reflexes are 2+ on the right side and 2+ on the left side.       Brachioradialis reflexes are 2+ on the right side and 2+ on the left side.       Patellar reflexes are 2+ on the right side and 2+ on the left side.       Achilles reflexes are 2+ on the right side and 2+ on the left side.     Comments: Cam Mosqueda's BL  9/3/24       Plan   Today's treatment:  SMT to regions of segmental dysfunction identified on exam, using age-appropriate force, and manual diversified technique.   Mobilization on CS  STM to patient tolerance to hypertonic paraspinal muscles, upper trapezius  Manual dynamic stretching of the bl gluteal mm & hamstrings  10:15 to 10:30 AM  Patient noted improved mobility and reduced pain post-treatment    Treatment Plan:   The patient and I discussed the risks and benefits of chiropractic care. Based on the patient's subjective complaints along with the examination findings, it is advised that a course of chiropractic treatment be initiated. The patient provided consent for care. The patient tolerated today's treatment with little or no additional discomfort and was instructed to contact the office for questions or concerns. Will see patient once per week then every 2 weeks when symptoms become  mild/manageable, further spaced apart contingent upon improvement.     This chart note was generated using dictation software, and as such, there may be typographical errors present. Abbreviations: Cervical spine (CS), cervical-thoracic (CT), Dry needling (DN), Flexion adduction internal rotation (FAIR), high velocity, low amplitude (HVLA), Lumbar spine (LS), Soft tissue manipulation (STM), spinal manipulative therapy (SMT), Straight leg raise (SLR), Thoracic spine (TS).

## 2024-12-21 RX ORDER — OMEPRAZOLE 40 MG/1
40 CAPSULE, DELAYED RELEASE ORAL
Qty: 30 CAPSULE | Refills: 11 | Status: SHIPPED | OUTPATIENT
Start: 2024-12-21 | End: 2025-12-21

## 2024-12-24 ENCOUNTER — APPOINTMENT (OUTPATIENT)
Dept: INTEGRATIVE MEDICINE | Facility: CLINIC | Age: 34
End: 2024-12-24
Payer: COMMERCIAL

## 2024-12-24 DIAGNOSIS — M54.40 CHRONIC LOW BACK PAIN WITH SCIATICA, SCIATICA LATERALITY UNSPECIFIED, UNSPECIFIED BACK PAIN LATERALITY: ICD-10-CM

## 2024-12-24 DIAGNOSIS — M99.02 SOMATIC DYSFUNCTION OF THORACIC REGION: ICD-10-CM

## 2024-12-24 DIAGNOSIS — G89.29 CHRONIC LOW BACK PAIN WITH SCIATICA, SCIATICA LATERALITY UNSPECIFIED, UNSPECIFIED BACK PAIN LATERALITY: ICD-10-CM

## 2024-12-24 DIAGNOSIS — M99.04 SACROILIAC JOINT SOMATIC DYSFUNCTION: Primary | ICD-10-CM

## 2024-12-24 DIAGNOSIS — M54.2 NECK PAIN: ICD-10-CM

## 2024-12-24 DIAGNOSIS — M99.03 SOMATIC DYSFUNCTION OF LUMBAR REGION: ICD-10-CM

## 2024-12-24 DIAGNOSIS — M99.01 SOMATIC DYSFUNCTION OF CERVICAL REGION: ICD-10-CM

## 2024-12-24 DIAGNOSIS — R51.9 CHRONIC DAILY HEADACHE: ICD-10-CM

## 2024-12-24 DIAGNOSIS — M79.10 MYALGIA: ICD-10-CM

## 2024-12-24 PROCEDURE — 97112 NEUROMUSCULAR REEDUCATION: CPT | Performed by: CHIROPRACTOR

## 2024-12-24 PROCEDURE — 98941 CHIROPRACT MANJ 3-4 REGIONS: CPT | Performed by: CHIROPRACTOR

## 2024-12-31 ENCOUNTER — TELEMEDICINE (OUTPATIENT)
Dept: PAIN MEDICINE | Facility: CLINIC | Age: 34
End: 2024-12-31
Payer: COMMERCIAL

## 2024-12-31 DIAGNOSIS — M79.7 FIBROMYALGIA: Primary | ICD-10-CM

## 2024-12-31 PROCEDURE — 99213 OFFICE O/P EST LOW 20 MIN: CPT | Performed by: NURSE PRACTITIONER

## 2024-12-31 RX ORDER — MIRTAZAPINE 7.5 MG/1
7.5 TABLET, FILM COATED ORAL NIGHTLY
COMMUNITY

## 2024-12-31 RX ORDER — PRAZOSIN HYDROCHLORIDE 1 MG/1
1 CAPSULE ORAL NIGHTLY
COMMUNITY

## 2024-12-31 RX ORDER — KETOROLAC TROMETHAMINE 30 MG/ML
30 INJECTION, SOLUTION INTRAMUSCULAR; INTRAVENOUS ONCE
OUTPATIENT
Start: 2025-01-15 | End: 2025-01-15

## 2024-12-31 RX ORDER — KETAMINE HCL IN NACL, ISO-OSM 100MG/10ML
SYRINGE (ML) INJECTION ONCE
OUTPATIENT
Start: 2025-01-15

## 2024-12-31 ASSESSMENT — ENCOUNTER SYMPTOMS
DIARRHEA: 0
NAUSEA: 0
NECK STIFFNESS: 1
FREQUENCY: 0
DEPRESSION: 0
LOSS OF SENSATION IN FEET: 0
PALPITATIONS: 0
FATIGUE: 1
CONFUSION: 0
NECK PAIN: 1
MYALGIAS: 1
HEADACHES: 1
CHILLS: 0
AGITATION: 0
OCCASIONAL FEELINGS OF UNSTEADINESS: 0
WHEEZING: 0
CHEST TIGHTNESS: 0
ARTHRALGIAS: 1
SHORTNESS OF BREATH: 0
BACK PAIN: 1

## 2024-12-31 ASSESSMENT — PAIN - FUNCTIONAL ASSESSMENT: PAIN_FUNCTIONAL_ASSESSMENT: 0-10

## 2024-12-31 ASSESSMENT — PAIN DESCRIPTION - DESCRIPTORS: DESCRIPTORS: ACHING;SORE

## 2024-12-31 ASSESSMENT — PAIN SCALES - GENERAL: PAINLEVEL_OUTOF10: 4

## 2024-12-31 NOTE — PROGRESS NOTES
Virtual or Telephone Consent    An interactive audio and video telecommunication system which permits real time communications between the patient (at the originating site) and provider (at the distant site) was utilized to provide this telehealth service.   Verbal consent was requested and obtained from Sasha Longoria on this date, 12/31/24 for a telehealth visit.     Chief Complain  Follow-up for the lower back shoulders and neck pain, also complains of fatigue.    History Of Present Illness  Sasha Longoria is a 34 y.o. female here for headaches, neck pain radiating to shoulders. The patient rates the pain at 4  on a scale from 0-10.  The patient describes pain as dull, aching.  The pain is worsened by bending forward, standing, prolonged sitting, walking, lifting, and twisting and is alleviated by medications nonsteroidal anti-inflammatory drugs and IV infusion therapy, position change, sitting, lying down, and neuro supplements .  Since the last visit the pain has improved.    The patient denies any fever, chills, weight loss, weakness, bladder/ bowel incontinence, history of cancer, history of IV drug abuse, recent trauma.     Portions of record reviewed for pertinent issues: active problem list, medication list, allergies, family history, social history, notes from last encounter, encounters, lab results, imaging and other available records.      I have personally reviewed the [facility] report for this patient. This report is scanned into the electronic medical record. I have considered the risks of abuse, dependence, addiction and diversion. It showed: Gabapentin from myself and Vyvanse trial from Dr. Carrizales was discontinued. Ambien and Eszopiclone from Dr. Guerra   Aberrant behavior: None    Past Medical History  She has a past medical history of Abnormal Pap smear of cervix, Anxiety, Arthritis (~ 2013), Chronic pain disorder, Depression, Eczema (~ 2005), Fibromyalgia, primary, GERD  (gastroesophageal reflux disease) (~), Headache, Headache, tension-type, HPV (human papilloma virus) infection, Joint pain, Low back pain, Memory loss, Migraine, Neck pain, Neuromuscular disorder (Multi) (), Numbness, Obstructive sleep apnea, Polycystic ovary syndrome, Scoliosis (~), and Visual impairment (~).    Surgical History  She has a past surgical history that includes Other surgical history (2019); Other surgical history (2022); and Tonsillectomy.     Social History  She reports that she has never smoked. She has never used smokeless tobacco. She reports current alcohol use of about 1.0 standard drink of alcohol per week. She reports that she does not currently use drugs after having used the following drugs: Marijuana. Frequency: 4.00 times per week.    Family History  Family History   Problem Relation Name Age of Onset    Breast cancer Paternal Grandmother      Arthritis Mother Haylee     Depression Mother Haylee     Diabetes Mother Haylee     Heart disease Mother Haylee     Hypertension Mother Haylee     Learning disabilities Mother Haylee     Intellectual Disability Mother Haylee     Miscarriages / Stillbirths Mother Haylee      labor Mother Haylee     Depression Father Lowell     Diabetes Father Lowell     Drug abuse Father Lowell     Hypertension Father Lowell     Mental illness Father Lowell     Alcohol abuse Maternal Grandfather Tera     Depression Maternal Grandfather Tera     Drug abuse Maternal Grandfather Tera     Heart disease Maternal Grandfather Tera     Stroke Maternal Grandfather Tera     Cancer Maternal Grandmother Carri     Depression Maternal Grandmother Carri     Diabetes Maternal Grandmother Carri     Depression Paternal Grandfather Whit     Heart disease Paternal Grandfather Whit     Hypertension Paternal Grandfather Whit     Migraines Paternal Grandfather Whit     Cancer Sister Amina     Depression Sister Amina     Cancer Sister Cayla      Depression Sister Cayla     Drug abuse Sister Cayla     Hypertension Sister Cayla     Miscarriages / Stillbirths Sister Cayla     Depression Sister Kenzie     Depression Sister Maria L     Depression Brother Chris     Drug abuse Mother's Brother Tera     Learning disabilities Brother Crow     Intellectual Disability Brother Crow         Allergies  Patient has no known allergies.    Review of Systems  Review of Systems   Constitutional:  Positive for fatigue. Negative for chills.   Respiratory:  Negative for chest tightness, shortness of breath and wheezing.    Cardiovascular:  Negative for chest pain and palpitations.   Gastrointestinal:  Negative for diarrhea and nausea.   Genitourinary:  Negative for frequency and urgency.   Musculoskeletal:  Positive for arthralgias, back pain, myalgias, neck pain and neck stiffness.   Neurological:  Positive for headaches.        Headaches gets Botox   Psychiatric/Behavioral:  Negative for agitation, confusion and suicidal ideas.         Physical Exam  Physical Exam  Constitutional:       General: She is not in acute distress.     Appearance: Normal appearance. She is obese.   HENT:      Head: Normocephalic.   Eyes:      Extraocular Movements: Extraocular movements intact.   Neurological:      General: No focal deficit present.      Mental Status: She is alert and oriented to person, place, and time.      GCS: GCS eye subscore is 4. GCS verbal subscore is 5.   Psychiatric:         Attention and Perception: Attention and perception normal.         Mood and Affect: Mood and affect normal.         Behavior: Behavior normal.         Thought Content: Thought content normal.         Judgment: Judgment normal.           Last Recorded Vitals  Virtual visit    Reviewed Images  10/14/2024 cervical spine x-ray  FINDINGS:  Cervical spine, four views  There is no fracture. There is no spondylolisthesis. There is no disc  space narrowing or osteophytosis. The prevertebral soft  tissues are  within normal limits.      IMPRESSION:  No acute abnormality in the cervical spine    10/14/2024 x-ray lumbar spine  FINDINGS:  Lumbar spine, four views      There is no fracture. There is no spondylolisthesis. There is no disc  space narrowing or osteophytosis. The prevertebral soft tissues are  within normal limits.      IMPRESSION:  Normal radiographs of the lumbar spine    Reviewed Labs  Lab Results   Component Value Date    GLUCOSE 74 11/07/2024    CALCIUM 8.8 11/07/2024     11/07/2024    K 4.4 11/07/2024    CO2 32 11/07/2024     11/07/2024    BUN 11 11/07/2024    CREATININE 0.82 11/07/2024         Assessment/Plan     Sasha Longoria is a 34 y.o. female recall past medical history of obesity BMI 61, anxiety and depression, PTSD, migraines receives Botox injections, fibromyalgia on gabapentin 300 mg 3 times daily, who is being treated with IV infusion therapy every 2 weeks for chronic widespread body aches and pains that radiate to upper and lower extremities.  IV infusion therapy provides 60% relief fibromyalgia pain for roughly 7 to 10 days.  Of note she reports less frequent flareups and less severe flareups when she does have flareups.  She is very involved with functional medicine doctor who is a improving her gut health and assisting with weight loss.  She is currently on Zepbound.  She denies any new neurological or constitutional symptoms.  Given the relief the IV infusion therapy is providing of her fibromyalgia pain plan to continue with IV infusion therapy every 2 weeks with the goal of transition every 3 weeks.  All questions and concerns answered.  Plan to follow-up in 3 months or sooner if needed.      Plan  At least 50% of the visit was involved in the discussion of the options for treatment. We discussed exercises, medication, interventional therapies and surgery. Healthy life style is essential with patient hard work to achieve the wellness. In addition;  discussion with the patient and/or family about any of the diagnostic results, impressions and/or recommended diagnostic studies, prognosis, risks and benefits of treatment options, instructions for treatment and/or follow-up, importance of compliance with chosen treatment options, risk-factor reduction, and patient/family education.        *Please note this report has been produced using speech recognition software and may contain errors related to that system including grammar, punctuation and spelling as well as words and phrases that may be inappropriate. If there are questions or concerns, please feel free to contact me to clarify.      I spent 27 minutes in the professional and overall care of this patient.       Damien Dos Santos, APRN-CNP

## 2025-01-06 ENCOUNTER — APPOINTMENT (OUTPATIENT)
Dept: DERMATOLOGY | Facility: CLINIC | Age: 35
End: 2025-01-06
Payer: COMMERCIAL

## 2025-01-06 ASSESSMENT — DERMATOLOGY QUALITY OF LIFE (QOL) ASSESSMENT
RATE HOW BOTHERED YOU ARE BY EFFECTS OF YOUR SKIN PROBLEMS ON YOUR ACTIVITIES (EG, GOING OUT, ACCOMPLISHING WHAT YOU WANT, WORK ACTIVITIES OR YOUR RELATIONSHIPS WITH OTHERS): 1
RATE HOW BOTHERED YOU ARE BY SYMPTOMS OF YOUR SKIN PROBLEM (EG, ITCHING, STINGING BURNING, HURTING OR SKIN IRRITATION): 0 - NEVER BOTHERED
RATE HOW EMOTIONALLY BOTHERED YOU ARE BY YOUR SKIN PROBLEM (FOR EXAMPLE, WORRY, EMBARRASSMENT, FRUSTRATION): 5
WHAT SINGLE SKIN CONDITION LISTED BELOW IS THE PATIENT ANSWERING THE QUALITY-OF-LIFE ASSESSMENT QUESTIONS ABOUT: NONE OF THE ABOVE

## 2025-01-06 ASSESSMENT — PATIENT GLOBAL ASSESSMENT (PGA): WHAT IS THE PGA: PATIENT GLOBAL ASSESSMENT:  2 - MILD

## 2025-01-07 ENCOUNTER — APPOINTMENT (OUTPATIENT)
Dept: DERMATOLOGY | Facility: CLINIC | Age: 35
End: 2025-01-07
Payer: COMMERCIAL

## 2025-01-07 ASSESSMENT — DERMATOLOGY QUALITY OF LIFE (QOL) ASSESSMENT
WHAT SINGLE SKIN CONDITION LISTED BELOW IS THE PATIENT ANSWERING THE QUALITY-OF-LIFE ASSESSMENT QUESTIONS ABOUT: NONE OF THE ABOVE
RATE HOW BOTHERED YOU ARE BY EFFECTS OF YOUR SKIN PROBLEMS ON YOUR ACTIVITIES (EG, GOING OUT, ACCOMPLISHING WHAT YOU WANT, WORK ACTIVITIES OR YOUR RELATIONSHIPS WITH OTHERS): 1
RATE HOW EMOTIONALLY BOTHERED YOU ARE BY YOUR SKIN PROBLEM (FOR EXAMPLE, WORRY, EMBARRASSMENT, FRUSTRATION): 5
RATE HOW BOTHERED YOU ARE BY SYMPTOMS OF YOUR SKIN PROBLEM (EG, ITCHING, STINGING BURNING, HURTING OR SKIN IRRITATION): 0 - NEVER BOTHERED

## 2025-01-07 ASSESSMENT — PATIENT GLOBAL ASSESSMENT (PGA): WHAT IS THE PGA: PATIENT GLOBAL ASSESSMENT:  2 - MILD

## 2025-01-08 ASSESSMENT — ENCOUNTER SYMPTOMS
AGITATION: 0
FEVER: 0
VOMITING: 0
DIZZINESS: 0
DYSURIA: 0
DIARRHEA: 0
SHORTNESS OF BREATH: 0
FATIGUE: 1
DIFFICULTY URINATING: 0
NAUSEA: 0
COLOR CHANGE: 0

## 2025-01-08 NOTE — PROGRESS NOTES
Assessment/Plan   Today's presentation is consistent with fibromyalgia/myofascial pain syndrome alongside postural strain and somatic dysfunction contributing to her pain syndrome, also has been diagnosed with inflammatory arthritis (not rheumatoid), PCOS, and has had elevated ESR/CRP and been under rheumatologic care.  Complicating factors include chronicity of symptoms as well as body habitus.  We will implement a trial of care to include various manipulative techniques which will range from instrument assisted to diversified.  We will utilize soft tissue techniques such as active release technique to the cervical spine musculature.  We will closely monitor their response to care to determine if any changes need to occur, if advanced imaging is needed, or if referral to other providers is appropriate. She will also be receiving acupuncture and integrative medicine consultation which is appropriate when considering her overall presentation     Full spine EOS radiographs 2024 - 12 degree dextroconvex scoliosis from T11 to L3. Hyperkyphosis in TS and hyperlordosis in LS    Brain MRI 2024 - IMPRESSION:  1. No MR evidence of acute intracranial infarct, hemorrhage, mass, or  mass effect.  2. Nonspecific 6 mm white matter FLAIR signal hyperintensity adjacent  to the trigone of the right lateral ventricle, which may be  indicative of minimal infectious inflammatory change, migraine  disorder, demyelinating disease, or vasculitis for instance in the  appropriate clinical context. Consider follow-up exam if clinically  indicated.    LS radiographs 2024 - IMPRESSION:  No acute pathologic findings are identified.    CS radiographs 2024 - IMPRESSION:  No pathologic findings are identified.    TS radiographs 2024 - IMPRESSION:  No acute pathologic findings are identified.  Thoracic lumbar dextroscoliosis.  Lower thoracic mild spondylosis.    Visits this year: 1 (under new insurance)    Subjective   Patient reports feeling  similar compared to last visit.  Although she did get relief symptoms returned gradually.  She just moved so she has been moving a lot of things around her new living space and this could have exacerbated lower back pain.  Currently endorses constant moderate intensity lower back pain and stiffness extending from the middle to lower back without radiation into the legs.  Is experiencing ongoing headaches currently experiencing a moderate intensity diffuse headache and is wearing her special glasses that she wears when she deals with a headache that seem to mitigate slightly in terms of the light sensitivity as they have jerrod tinted lenses    HPI - 10/05/23 (CR): the patient presents today per the referral of Dr. Suárez and pain management for evaluation and and management of chronic full spine complaints. She has in the past been diagnosed with fibromyalgia. She has dealt with pain for several years and has received chiropractic care in the past. She did have mixed results with chiropractic care in the past, reporting a variation of instrument assisted manipulation and manual manipulation. But she wished to attend chiropractic care again through a hospital-based provider. She is under the care of pain management and will be starting infusions next week. She is also on the wait list for infusions at [facility] if needed. Overall, her pain levels remain moderate to severe. Her pain is constant. Symptoms in the lower back and hips seem to be the area of most intensity. But she does continue to experience pain throughout the spine. She experiences paresthesias in the upper and lower extremities bilaterally. She has difficulty finding any comfortable positions     Review of Systems   Constitutional:  Positive for fatigue. Negative for fever.   Eyes:  Negative for visual disturbance.   Respiratory:  Negative for shortness of breath.    Cardiovascular:  Negative for chest pain.   Gastrointestinal:  Negative for  diarrhea, nausea and vomiting.   Genitourinary:  Negative for difficulty urinating and dysuria.   Skin:  Negative for color change.   Neurological:  Negative for dizziness.   Psychiatric/Behavioral:  Negative for agitation.    All other systems reviewed and are negative.    Objective   Examination findings (e.g., palpation & ROM): Decreased C/S and L/S ROM with pain, hypertonic and tender cervical and lumbar erectors, BL upper trapezius  SLR BL 60    Segmental joint dysfunction was identified in the following areas using motion palpation and/or pain provocation assessment:  Cervical: 2  Thoracic: 5-7  Lumbopelvic: 1-3, BL SIJ      Physical Exam  Neurological:      General: No focal deficit present.      Mental Status: She is alert.      Motor: Motor function is intact.      Coordination: Coordination is intact.      Gait: Gait is intact.      Deep Tendon Reflexes: Reflexes are normal and symmetric.      Reflex Scores:       Tricep reflexes are 2+ on the right side and 2+ on the left side.       Bicep reflexes are 2+ on the right side and 2+ on the left side.       Brachioradialis reflexes are 2+ on the right side and 2+ on the left side.       Patellar reflexes are 2+ on the right side and 2+ on the left side.       Achilles reflexes are 2+ on the right side and 2+ on the left side.     Comments: Trace Jaylin's BL  9/3/24       Plan   Today's treatment:  SMT to regions of segmental dysfunction identified on exam, using age-appropriate force, and manual diversified technique.   Mobilization on CS  STM to patient tolerance to hypertonic paraspinal muscles, upper trapezius  Manual dynamic stretching of the bl gluteal mm & hamstrings  1:41 to 1:56 PM  Patient noted improved mobility and reduced pain post-treatment    Treatment Plan:   The patient and I discussed the risks and benefits of chiropractic care. Based on the patient's subjective complaints along with the examination findings, it is advised that a course of  chiropractic treatment be initiated. The patient provided consent for care. The patient tolerated today's treatment with little or no additional discomfort and was instructed to contact the office for questions or concerns. Will see patient once per week then every 2 weeks when symptoms become mild/manageable, further spaced apart contingent upon improvement.     This chart note was generated using dictation software, and as such, there may be typographical errors present. Abbreviations: Cervical spine (CS), cervical-thoracic (CT), Dry needling (DN), Flexion adduction internal rotation (FAIR), high velocity, low amplitude (HVLA), Lumbar spine (LS), Soft tissue manipulation (STM), spinal manipulative therapy (SMT), Straight leg raise (SLR), Thoracic spine (TS).

## 2025-01-09 ENCOUNTER — APPOINTMENT (OUTPATIENT)
Dept: INTEGRATIVE MEDICINE | Facility: CLINIC | Age: 35
End: 2025-01-09
Payer: COMMERCIAL

## 2025-01-09 DIAGNOSIS — M99.02 SOMATIC DYSFUNCTION OF THORACIC REGION: ICD-10-CM

## 2025-01-09 DIAGNOSIS — M79.10 MYALGIA: ICD-10-CM

## 2025-01-09 DIAGNOSIS — M54.40 CHRONIC LOW BACK PAIN WITH SCIATICA, SCIATICA LATERALITY UNSPECIFIED, UNSPECIFIED BACK PAIN LATERALITY: ICD-10-CM

## 2025-01-09 DIAGNOSIS — M99.04 SACROILIAC JOINT SOMATIC DYSFUNCTION: Primary | ICD-10-CM

## 2025-01-09 DIAGNOSIS — M99.03 SOMATIC DYSFUNCTION OF LUMBAR REGION: ICD-10-CM

## 2025-01-09 DIAGNOSIS — R51.9 CHRONIC DAILY HEADACHE: ICD-10-CM

## 2025-01-09 DIAGNOSIS — M54.2 NECK PAIN: ICD-10-CM

## 2025-01-09 DIAGNOSIS — G89.29 CHRONIC LOW BACK PAIN WITH SCIATICA, SCIATICA LATERALITY UNSPECIFIED, UNSPECIFIED BACK PAIN LATERALITY: ICD-10-CM

## 2025-01-09 DIAGNOSIS — M99.01 SOMATIC DYSFUNCTION OF CERVICAL REGION: ICD-10-CM

## 2025-01-14 ENCOUNTER — APPOINTMENT (OUTPATIENT)
Dept: PHYSICAL THERAPY | Facility: CLINIC | Age: 35
End: 2025-01-14
Payer: COMMERCIAL

## 2025-01-15 ENCOUNTER — INFUSION (OUTPATIENT)
Dept: INFUSION THERAPY | Facility: CLINIC | Age: 35
End: 2025-01-15
Payer: COMMERCIAL

## 2025-01-15 VITALS
DIASTOLIC BLOOD PRESSURE: 77 MMHG | SYSTOLIC BLOOD PRESSURE: 134 MMHG | TEMPERATURE: 97.3 F | HEART RATE: 88 BPM | OXYGEN SATURATION: 97 % | RESPIRATION RATE: 17 BRPM

## 2025-01-15 DIAGNOSIS — M79.7 FIBROMYALGIA: ICD-10-CM

## 2025-01-15 LAB — PREGNANCY TEST URINE, POC: NEGATIVE

## 2025-01-15 PROCEDURE — 2500000004 HC RX 250 GENERAL PHARMACY W/ HCPCS (ALT 636 FOR OP/ED): Performed by: NURSE PRACTITIONER

## 2025-01-15 PROCEDURE — 96368 THER/DIAG CONCURRENT INF: CPT | Mod: INF

## 2025-01-15 PROCEDURE — 96375 TX/PRO/DX INJ NEW DRUG ADDON: CPT | Mod: INF

## 2025-01-15 PROCEDURE — 81025 URINE PREGNANCY TEST: CPT

## 2025-01-15 PROCEDURE — 96365 THER/PROPH/DIAG IV INF INIT: CPT | Mod: INF

## 2025-01-15 RX ORDER — ONDANSETRON HYDROCHLORIDE 2 MG/ML
4 INJECTION, SOLUTION INTRAVENOUS ONCE
OUTPATIENT
Start: 2025-01-29 | End: 2025-01-29

## 2025-01-15 RX ORDER — DIPHENHYDRAMINE HYDROCHLORIDE 50 MG/ML
50 INJECTION INTRAMUSCULAR; INTRAVENOUS AS NEEDED
OUTPATIENT
Start: 2025-01-29

## 2025-01-15 RX ORDER — DIPHENHYDRAMINE HYDROCHLORIDE 50 MG/ML
25 INJECTION INTRAMUSCULAR; INTRAVENOUS ONCE
OUTPATIENT
Start: 2025-01-29 | End: 2025-01-29

## 2025-01-15 RX ORDER — KETAMINE HCL IN NACL, ISO-OSM 100MG/10ML
SYRINGE (ML) INJECTION ONCE
OUTPATIENT
Start: 2025-01-29

## 2025-01-15 RX ORDER — FAMOTIDINE 10 MG/ML
20 INJECTION INTRAVENOUS ONCE AS NEEDED
OUTPATIENT
Start: 2025-01-29

## 2025-01-15 RX ORDER — ALBUTEROL SULFATE 0.83 MG/ML
3 SOLUTION RESPIRATORY (INHALATION) AS NEEDED
OUTPATIENT
Start: 2025-01-29

## 2025-01-15 RX ORDER — NITROGLYCERIN 0.4 MG/1
0.4 TABLET SUBLINGUAL ONCE
OUTPATIENT
Start: 2025-01-29 | End: 2025-01-29

## 2025-01-15 RX ORDER — EPINEPHRINE 0.3 MG/.3ML
0.3 INJECTION SUBCUTANEOUS EVERY 5 MIN PRN
OUTPATIENT
Start: 2025-01-29

## 2025-01-15 RX ORDER — KETAMINE HCL IN NACL, ISO-OSM 100MG/10ML
SYRINGE (ML) INJECTION ONCE
Status: COMPLETED | OUTPATIENT
Start: 2025-01-15 | End: 2025-01-15

## 2025-01-15 RX ORDER — KETOROLAC TROMETHAMINE 30 MG/ML
30 INJECTION, SOLUTION INTRAMUSCULAR; INTRAVENOUS ONCE
Status: COMPLETED | OUTPATIENT
Start: 2025-01-15 | End: 2025-01-15

## 2025-01-15 RX ORDER — KETOROLAC TROMETHAMINE 30 MG/ML
30 INJECTION, SOLUTION INTRAMUSCULAR; INTRAVENOUS ONCE
OUTPATIENT
Start: 2025-01-29 | End: 2025-01-29

## 2025-01-15 RX ORDER — METOCLOPRAMIDE HYDROCHLORIDE 5 MG/ML
10 INJECTION INTRAMUSCULAR; INTRAVENOUS ONCE
OUTPATIENT
Start: 2025-01-29 | End: 2025-01-29

## 2025-01-15 RX ORDER — METOPROLOL TARTRATE 25 MG/1
25 TABLET, FILM COATED ORAL ONCE
OUTPATIENT
Start: 2025-01-29 | End: 2025-01-29

## 2025-01-15 RX ADMIN — PROPOFOL 100 MG: 10 INJECTION, EMULSION INTRAVENOUS at 09:35

## 2025-01-15 RX ADMIN — Medication: at 09:35

## 2025-01-15 RX ADMIN — KETOROLAC TROMETHAMINE 30 MG: 30 INJECTION, SOLUTION INTRAMUSCULAR at 09:35

## 2025-01-15 ASSESSMENT — PAIN SCALES - GENERAL
PAINLEVEL_OUTOF10: 3
PAINLEVEL_OUTOF10: 7

## 2025-01-15 ASSESSMENT — ENCOUNTER SYMPTOMS
OCCASIONAL FEELINGS OF UNSTEADINESS: 0
LOSS OF SENSATION IN FEET: 1
DEPRESSION: 0

## 2025-01-15 ASSESSMENT — COLUMBIA-SUICIDE SEVERITY RATING SCALE - C-SSRS
6. HAVE YOU EVER DONE ANYTHING, STARTED TO DO ANYTHING, OR PREPARED TO DO ANYTHING TO END YOUR LIFE?: NO
6. HAVE YOU EVER DONE ANYTHING, STARTED TO DO ANYTHING, OR PREPARED TO DO ANYTHING TO END YOUR LIFE?: NO
1. IN THE PAST MONTH, HAVE YOU WISHED YOU WERE DEAD OR WISHED YOU COULD GO TO SLEEP AND NOT WAKE UP?: NO
2. HAVE YOU ACTUALLY HAD ANY THOUGHTS OF KILLING YOURSELF?: NO

## 2025-01-15 NOTE — PATIENT INSTRUCTIONS
Today :We administered ketamine 30 mg-lidocaine 300 mg, propofol (Diprivan), and ketorolac.     For:   1. Fibromyalgia         Your next appointment is due in:  SEE AVS        Please read the  Medication Guide that was given to you and reviewed during todays visit.     (Tell all doctors including dentists that you are taking this medication)     Go to the emergency room or call 911 if:  -You have signs of allergic reaction:   -Rash, hives, itching.   -Swollen, blistered, peeling skin.   -Swelling of face, lips, mouth, tongue or throat.   -Tightness of chest, trouble breathing, swallowing or talking     Call your doctor:  - If IV / injection site gets red, warm, swollen, itchy or leaks fluid or pus.     (Leave dressing on your IV site for at least 2 hours and keep area clean and dry  - If you get sick or have symptoms of infection or are not feeling well for any reason.    (Wash your hands often, stay away from people who are sick)  - If you have side effects from your medication that do not go away or are bothersome.     (Refer to the teaching your nurse gave you for side effects to call your doctor about)    - Common side effects may include:  stuffy nose, headache, feeling tired, muscle aches, upset stomach  - Before receiving any vaccines     - Call the [facility] at   If:  - You get sick, are on antibiotics, have had a recent vaccine, have surgery or dental work and your doctor wants your visit rescheduled.  - You need to cancel and reschedule your visit for any reason. Call at least 2 days before your visit if you need to cancel.   - Your insurance changes before your next visit.    (We will need to get approval from your new insurance. This can take up to two weeks.)     The [facility] is opened Monday thru Friday. We are closed on weekends and holidays.   Voice mail will take your call if the center is closed. If you leave a message please allow 24 hours for a call back  during weekdays. If you leave a message on a weekend/holiday, we will call you back the next business day.    A pharmacist is available Monday - Friday from 8:30AM to 3:30PM to help answer any questions you may have about your prescriptions(s). Please call pharmacy at:    [facility]: [phone]  [facility]: [phone]  [facility]: [phone]              [facility]      Pain Infusion Aftercare Instructions      1. It is normal to feel sedated, tired and low in energy after a pain infusion. DO NOT DRIVE, OPERATE ANY MACHINERY, OR MAKE ANY IMPORTANT DECISIONS FOR AT LEAST 24 HOURS AFTER THE INFUSION.     2. Call the pain center at [phone] with any problems, questions, or concerns.     3. Eat light after the infusion. If you feel queasy or sick to your stomach, laying down with your eyes closed may help. When you resume eating start with something mild like clear liquids, yogurt, applesauce, crackers, etc… Gradually advance to a regular diet.     4. Do not leave your house alone the evening of your pain infusion.     5. No alcohol or sedative medications, such as sleeping pills, for 24 hours after your pain infusion.     6. Resume all other prescribed medications unless directed otherwise by you physician.     7. If you have any medical emergencies, call 911 or go directly to the closest emergency room.

## 2025-01-15 NOTE — PROGRESS NOTES
S: Patient here for #26 opioid sparing pain infusion. Patient reports 70% reduction in pain after last infusion that lasted 2 WKIS.    Purpose of pain infusion meds explained along with potential side effects.  Patient verbalized understanding.    B: Pain Issues. Is Patient breast feeding: NO    A: Patient currently has pain described on flow sheet documentation. Designated  is [facility] [phone]. Patient last ate solid food 10 hours ago, and had liquid 1 hours ago. Pre pain 7/10 Post pain 3/10    R: Plan; Obtain IV access, do patient risk assessment, and start opioid sparing infusion as ordered. Monitoring for S/S of adverse reactions.

## 2025-01-16 DIAGNOSIS — Z45.2 ENCOUNTER FOR INSERTION OF VENOUS ACCESS PORT: Primary | ICD-10-CM

## 2025-01-20 DIAGNOSIS — G43.009 MIGRAINE WITHOUT AURA AND WITHOUT STATUS MIGRAINOSUS, NOT INTRACTABLE: Primary | ICD-10-CM

## 2025-01-20 RX ORDER — DIPHENHYDRAMINE HYDROCHLORIDE 50 MG/ML
50 INJECTION INTRAMUSCULAR; INTRAVENOUS AS NEEDED
OUTPATIENT
Start: 2025-01-20

## 2025-01-20 RX ORDER — ALBUTEROL SULFATE 0.83 MG/ML
3 SOLUTION RESPIRATORY (INHALATION) AS NEEDED
OUTPATIENT
Start: 2025-01-20

## 2025-01-20 RX ORDER — FAMOTIDINE 10 MG/ML
20 INJECTION INTRAVENOUS ONCE AS NEEDED
OUTPATIENT
Start: 2025-01-20

## 2025-01-20 RX ORDER — EPTINEZUMAB-JJMR 100 MG/ML
100 INJECTION INTRAVENOUS
Qty: 1 ML | Refills: 2 | Status: SHIPPED | OUTPATIENT
Start: 2025-01-20

## 2025-01-20 RX ORDER — EPINEPHRINE 0.3 MG/.3ML
0.3 INJECTION SUBCUTANEOUS EVERY 5 MIN PRN
OUTPATIENT
Start: 2025-01-20

## 2025-01-20 ASSESSMENT — ENCOUNTER SYMPTOMS
DIFFICULTY URINATING: 0
DYSURIA: 0
SHORTNESS OF BREATH: 0
NAUSEA: 0
COLOR CHANGE: 0
DIZZINESS: 0
FATIGUE: 1
VOMITING: 0
FEVER: 0
DIARRHEA: 0
AGITATION: 0

## 2025-01-20 NOTE — PROGRESS NOTES
"  Patient ID: Sasha Longoria is a 34 y.o. female who presents for No chief complaint on file..    Referring Provider: STEPHANIE Noriega    Pt was referred for weight managment, insulin resistance, interested in Continuous glucose monitor   Last visit with referring provider: 3/1/24    Subjective     Preferred Pharmacy:    [facility] - [place], [place] - [address]  [address] [address]  Phone: [phone] Fax: [phone]    [facility] - [place], [place] - [address]  [address]  [facility] [address]  Phone: [phone] Fax: [phone]    [facility]/pharmacy #[id number] - [place], [place] - [address]  [address] [address]  Phone: [phone] Fax: [phone]     [facility]  [address] [address]  Phone: [phone] Fax: [phone]      Adherence:   Do you have copays on your medications? Yes  Do you have trouble affording your current medications? No, but in \"limbo\" was laid off, still looking for a job. Has previous employers insurance currently through [employer] (has ~2 more months remaining).   How many doses of medication did you miss in the last 7 days? 1 dose, morning most often, so many pills, gets distracted and forgets to come back to take all meds.     Subjective/Objective:      Patient identified goal:   Increase endurance (VO2 Max is \"terrible\")  Energy to do ADL, regaining independence  More active, without needing a week of recovery time  More comfortable physically  \"A few years ago\" was at 147 lbs and felt good, today desires to be at a size that is healthy and manageable to maintain.     Onset:   When fibromyalgia was triggered, fall of 2020.   Lifetime of chronic stress and trauma. Traumatic event occurred at this time.   Used to be active, exercising 6 days/week, noticed she couldn't do what she usually could with weight training. " "    Past success:   X 2 years exercised 6 d/week with an active rest day along with caloric restriction.     Support network:   Partner  Therapist  Friends    How long implementing diet/lifestyle change for weight loss:   3 months this time, previously x 2 years.     Disordered eating patterns:   Binge eating disorder without purging - not current- when seeking comfort    Concurrent chronic conditions:   Lab Results   Component Value Date    CHHDL 3.4 11/07/2024    TRIG 82 11/07/2024       Prediabetes/Diabetes? Insulin resistance (TAURUS-IR 4.4)    Pertinent PMH Review:   PMH of Pancreatitis: No  PMH of Gallbladder disease: No  PMH of Delayed Gastric Emptying:  Unsure, hx of stomach cramps/pains/gassy/bloating  PMH of MTC: No  PMH of Retinopathy: No, visits eye doctor regularly because of \"potential retinal detachment in one eye\"   PMH of Urinary Tract Infections: No  PMH of Suicidal Ideation: Yes, past.   Female on oral contraceptive? IUD    Migraines? Yes, 12-13 years ago.   Allergies? Hives/Rash with intensive immune response, has had immune panels that came back negative. Sensitive skin, dermatitis.     Anxiety? Yes    Thyroid health:   Lab Results   Component Value Date    TSH 2.42 11/07/2024        Medications potentially contributing to weight gain:   Antipsychotics/Mood stabilizers/Antiepileptics/Nerve pain: gabapentin,      Medications tried/stopped for weight management:     None, past provider tried Wegovy but insurance denied because also on topiramate.     HPI    Objective     There were no vitals taken for this visit.     Labs  Lab Results   Component Value Date    BILITOT 0.4 11/07/2024    CALCIUM 8.8 11/07/2024    CO2 32 11/07/2024     11/07/2024    CREATININE 0.82 11/07/2024    GLUCOSE 74 11/07/2024    ALKPHOS 77 11/07/2024    K 4.4 11/07/2024    PROT 6.0 (L) 11/07/2024     11/07/2024    AST 12 11/07/2024    ALT 16 11/07/2024    BUN 11 11/07/2024    ANIONGAP 10 11/07/2024    ALBUMIN 4.0 " "11/07/2024     Lab Results   Component Value Date    TRIG 82 11/07/2024    CHOL 139 11/07/2024    LDLCALC 82 11/07/2024    HDL 40.4 11/07/2024     Lab Results   Component Value Date    HGBA1C 4.6 11/07/2024    HGBA1C 5.1 02/29/2024    HGBA1C 5.1 02/20/2024     The ASCVD Risk score (Yesenia JACKSON, et al., 2019) failed to calculate for the following reasons:    The 2019 ASCVD risk score is only valid for ages 40 to 79  No results found for: \"VITD25\"    Current Outpatient Medications on File Prior to Visit   Medication Sig Dispense Refill    alpha lipoic acid 600 mg capsule Take by mouth. 1 capsule daily after meal 1 week  Then 2 capsules for 1 week  Then 3 capsules for 1 week  Then 4 capsules daily.      buPROPion XL (Wellbutrin XL) 300 mg 24 hr tablet TAKE 1 TABLET BY MOUTH EVERY DAY IN THE MORNING AS DIRECTED      cholecalciferol (Vitamin D-3) 25 MCG (1000 UT) capsule Take 2 capsules (50 mcg) by mouth once daily. (Patient taking differently: Take 1 capsule (25 mcg) by mouth once daily.)      clobetasol (Temovate) 0.05 % ointment once daily as needed.      coenzyme Q-10 (Co Q-10) 200 mg capsule Take 1 capsule (200 mg) by mouth 3 times a day.      cyanocobalamin, vitamin B-12, 1,000 mcg/mL drops Take by mouth once daily. 1 dropper per day      dihydroergotamine (Trudhesa) 0.725 mg/pump act. (4 mg/mL) nasal spray Administer 1 spray into each nostril every 1 hour if needed for migraine. Dose may be repeated in 1 hour after the first dose. No more than 2 doses within 24 hours or 3 doses within 7 days. 6 each 3    divalproex (Depakote ER) 250 mg 24 hr tablet Take 1 tablet (250 mg) by mouth once daily. 90 tablet 0    DULoxetine (Cymbalta) 60 mg DR capsule Take 1 capsule (60 mg) by mouth once daily. Do not crush or chew.      eptinezumab (Vyepti) injection Infuse 1 mL (100 mg total) into a venous catheter every 3 months. 1 mL 2    ferrous sulfate, 325 mg ferrous sulfate, tablet Take 1 tablet (325 mg) by mouth once daily with " "breakfast. 48 tablet 3    gabapentin (Neurontin) 300 mg capsule Take 1 capsule (300 mg) by mouth 3 times a day. 270 capsule 0    ketoconazole (NIZOral) 2 % shampoo Lather onto scalp and let sit for 5 mins before rinsing once daily until improvement.  May decrease to once-twice weekly for maintenance.      leflunomide (Arava) 10 mg tablet       levonorgestrel (Mirena) 21 mcg/24 hours (8 yrs) 52 mg IUD by intrauterine route.      MAGNESIUM GLYCINATE ORAL Take 1 capsule by mouth once daily.      mirtazapine (Remeron) 7.5 mg tablet Take 1 tablet (7.5 mg) by mouth once daily at bedtime.      omeprazole (PriLOSEC) 40 mg DR capsule Take 1 capsule (40 mg) by mouth once daily in the morning. Take before meals. 30 capsule 11    ondansetron (Zofran) 4 mg tablet Take 2 tablets (8 mg) by mouth every 8 hours if needed for nausea or vomiting. 20 tablet 1    prazosin (Minipress) 1 mg capsule Take 1 capsule (1 mg) by mouth once daily at bedtime.      Qulipta 60 mg tablet tablet Take 1 tablet (60 mg) by mouth once daily. 30 tablet 3    sennosides (Senokot) 8.6 mg tablet Take 1 tablet (8.6 mg) by mouth once daily. 90 tablet 3    sulfaSALAzine (Azulfidine) 500 mg DR tablet       tirzepatide, weight loss, (Zepbound) 2.5 mg/0.5 mL injection Inject 2.5 mg under the skin every 7 days. (Patient taking differently: Inject 5 mg under the skin every 7 days.) 4 each 11    triamcinolone (Kenalog) 0.1 % ointment Apply to elbows twice daily only as needed for irritation.  Do not apply more than 21 days per month.       No current facility-administered medications on file prior to visit.        Medication and allergy reconciliation completed     Drug Interactions       Assessment/Plan   Zepbound  Working on adjusting to the 5mg dose  Taking ondansetron as needed for nausea  Starting mirtazapine has been eating more  Cooking more at home and doing food prep, avoiding fast food, \"my diet has gotten ,\" is experiencing sugar cravings.   She has " "enrolled in a program provided by her insurance in order to obtain zepbound coverage, finds the program beneficial.   Weight loss: 260-264 feels \"stuck here\"   Currently not exercising due to feeling \"exhausted\" but does feel she is more able to keep up with household chores since starting Zepbound.   She would like to go back to the therapy pool at least once weekly   Has 2-5 lbs weights at home  Has been working on removing endocrine disrupters from her environment  Part of a support group for non-toxic living    Sleep   She started mirtazapine for sleep (by psychiatry) which has increased appetite   Had been on trazodone prior, she has discontinued  Taking prazosin for vivid dreams, increased to 2mg    Brain Fog (troubles with focus, word recollection, critical thinking- experiencing difficulty at work and in personal life).   Patient requests review of adverse effects of her current medication regimen, first noticed the above symptoms after leave of absence from work 4 years ago. Stressed to patient, not to discontinue any medications without talking to the prescriber first.   Bupropion - insomnia (11-40%)  Dihydroergotamine- drowsiness (3%), anxiety  Divalproex (for headache)- drowsiness 7-30%, insomnia 9-25%  Duloxetine (taking for fibromyalgia, hasn't noticed benefit for pain)- drowsiness 9-11%, fatigue 5-11%, she has noticed at higher doses inability to achieve orgasm.   Gabapentin (taking bid) - drowsiness 19-21%, amnesia 2%, changes in thinking 2-3%, confusion >1%, depression 2%, memory impairment >1%  Mirtazapine- drowsiness 54%,   Prazosin- depression 1-4%, drowsiness 8%, fatigue 7%, nervousness 1-4%  Qulipta - drowsiness <5% and fatigue <5%     Working with Wendy Aguilar DC for Functional Medicine  Currently taking many supplements       CONTINUE  Zepbound 5mg once weekly  **Update [facility] pharmacy is out of network, patient would prefer rx is sent to [facility].     Follow-up: 2/28/25 11am   "   Time spent with pt: Total length of time 35 (minutes) of the encounter and more than 50% was spent counseling the patient.      Briseida Polanco, Pharm.D, [facility], [facility]  Clinical Pharmacist  Pharmacy Services  [phone]    Continue all meds under the continuation of care with the referring provider and clinical pharmacy team.    Verbal consent to manage patient's drug therapy was obtained from the patient and/or an individual authorized to act on behalf of a patient. They were informed they may decline to participate or withdraw from participation in pharmacy services at any time.

## 2025-01-21 ENCOUNTER — APPOINTMENT (OUTPATIENT)
Dept: INTEGRATIVE MEDICINE | Facility: CLINIC | Age: 35
End: 2025-01-21
Payer: COMMERCIAL

## 2025-01-21 DIAGNOSIS — M99.01 SOMATIC DYSFUNCTION OF CERVICAL REGION: ICD-10-CM

## 2025-01-21 DIAGNOSIS — M99.04 SACROILIAC JOINT SOMATIC DYSFUNCTION: Primary | ICD-10-CM

## 2025-01-21 DIAGNOSIS — G89.29 CHRONIC NECK PAIN: ICD-10-CM

## 2025-01-21 DIAGNOSIS — M99.02 SOMATIC DYSFUNCTION OF THORACIC REGION: ICD-10-CM

## 2025-01-21 DIAGNOSIS — G89.29 CHRONIC LOW BACK PAIN WITH SCIATICA, SCIATICA LATERALITY UNSPECIFIED, UNSPECIFIED BACK PAIN LATERALITY: ICD-10-CM

## 2025-01-21 DIAGNOSIS — M54.40 CHRONIC LOW BACK PAIN WITH SCIATICA, SCIATICA LATERALITY UNSPECIFIED, UNSPECIFIED BACK PAIN LATERALITY: ICD-10-CM

## 2025-01-21 DIAGNOSIS — M79.10 MYALGIA: ICD-10-CM

## 2025-01-21 DIAGNOSIS — M99.03 SOMATIC DYSFUNCTION OF LUMBAR REGION: ICD-10-CM

## 2025-01-21 DIAGNOSIS — M54.2 CHRONIC NECK PAIN: ICD-10-CM

## 2025-01-21 DIAGNOSIS — M54.2 NECK PAIN: ICD-10-CM

## 2025-01-21 NOTE — PROGRESS NOTES
Assessment/Plan   Today's presentation is consistent with fibromyalgia/myofascial pain syndrome alongside postural strain and somatic dysfunction contributing to her pain syndrome, also has been diagnosed with inflammatory arthritis (not rheumatoid), PCOS, and has had elevated ESR/CRP and been under rheumatologic care.  Complicating factors include chronicity of symptoms as well as body habitus.  We will implement a trial of care to include various manipulative techniques which will range from instrument assisted to diversified.  We will utilize soft tissue techniques such as active release technique to the cervical spine musculature.  We will closely monitor their response to care to determine if any changes need to occur, if advanced imaging is needed, or if referral to other providers is appropriate. She will also be receiving acupuncture and integrative medicine consultation which is appropriate when considering her overall presentation     Full spine EOS radiographs 2024 - 12 degree dextroconvex scoliosis from T11 to L3. Hyperkyphosis in TS and hyperlordosis in LS    Brain MRI 2024 - IMPRESSION:  1. No MR evidence of acute intracranial infarct, hemorrhage, mass, or  mass effect.  2. Nonspecific 6 mm white matter FLAIR signal hyperintensity adjacent  to the trigone of the right lateral ventricle, which may be  indicative of minimal infectious inflammatory change, migraine  disorder, demyelinating disease, or vasculitis for instance in the  appropriate clinical context. Consider follow-up exam if clinically  indicated.    LS radiographs 2024 - IMPRESSION:  No acute pathologic findings are identified.    CS radiographs 2024 - IMPRESSION:  No pathologic findings are identified.    TS radiographs 2024 - IMPRESSION:  No acute pathologic findings are identified.  Thoracic lumbar dextroscoliosis.  Lower thoracic mild spondylosis.    Visits this year: 2 (under new insurance)    Subjective   Patient reports ongoing  challenges with neck and back pain and headaches currently endorsing a bifrontal 7 out of 10 intensity headache since yesterday.  She is following up with pharmacist soon from our department to review her medication list and potential options and she is also seeing outside functional medicine practitioner and pain management.  She is staying active but struggling with chronic pain.  Neck and back pain are moderate and localized without radiation numbness or tingling.    HPI - 10/05/23 (CR): the patient presents today per the referral of Dr. Suárez and pain management for evaluation and and management of chronic full spine complaints. She has in the past been diagnosed with fibromyalgia. She has dealt with pain for several years and has received chiropractic care in the past. She did have mixed results with chiropractic care in the past, reporting a variation of instrument assisted manipulation and manual manipulation. But she wished to attend chiropractic care again through a hospital-based provider. She is under the care of pain management and will be starting infusions next week. She is also on the wait list for infusions at [facility] if needed. Overall, her pain levels remain moderate to severe. Her pain is constant. Symptoms in the lower back and hips seem to be the area of most intensity. But she does continue to experience pain throughout the spine. She experiences paresthesias in the upper and lower extremities bilaterally. She has difficulty finding any comfortable positions     Review of Systems   Constitutional:  Positive for fatigue. Negative for fever.   Eyes:  Negative for visual disturbance.   Respiratory:  Negative for shortness of breath.    Cardiovascular:  Negative for chest pain.   Gastrointestinal:  Negative for diarrhea, nausea and vomiting.   Genitourinary:  Negative for difficulty urinating and dysuria.   Skin:  Negative for color change.   Neurological:  Negative for dizziness.    Psychiatric/Behavioral:  Negative for agitation.    All other systems reviewed and are negative.    Objective   Examination findings (e.g., palpation & ROM): Decreased C/S and L/S ROM with pain, hypertonic and tender cervical and lumbar erectors, BL upper trapezius  SLR BL 65    Segmental joint dysfunction was identified in the following areas using motion palpation and/or pain provocation assessment:  Cervical: 2  Thoracic: 5-8  Lumbopelvic: 1-3, BL SIJ      Physical Exam  Neurological:      General: No focal deficit present.      Mental Status: She is alert.      Motor: Motor function is intact.      Coordination: Coordination is intact.      Gait: Gait is intact.      Deep Tendon Reflexes: Reflexes are normal and symmetric.      Reflex Scores:       Tricep reflexes are 2+ on the right side and 2+ on the left side.       Bicep reflexes are 2+ on the right side and 2+ on the left side.       Brachioradialis reflexes are 2+ on the right side and 2+ on the left side.       Patellar reflexes are 2+ on the right side and 2+ on the left side.       Achilles reflexes are 2+ on the right side and 2+ on the left side.     Comments: Trace Jaylin's BL  9/3/24       Plan   Today's treatment:  SMT to regions of segmental dysfunction identified on exam, using age-appropriate force, and manual diversified technique.   Mobilization on CS  STM to patient tolerance to hypertonic paraspinal muscles, upper trapezius BL  Manual dynamic stretching of the bl gluteal mm & hamstrings  11:40 to 11:56 AM  Patient noted improved mobility and reduced pain post-treatment    Treatment Plan:   The patient and I discussed the risks and benefits of chiropractic care. Based on the patient's subjective complaints along with the examination findings, it is advised that a course of chiropractic treatment be initiated. The patient provided consent for care. The patient tolerated today's treatment with little or no additional discomfort and was  instructed to contact the office for questions or concerns. Will see patient once per week then every 2 weeks when symptoms become mild/manageable, further spaced apart contingent upon improvement.     This chart note was generated using dictation software, and as such, there may be typographical errors present. Abbreviations: Cervical spine (CS), cervical-thoracic (CT), Dry needling (DN), Flexion adduction internal rotation (FAIR), high velocity, low amplitude (HVLA), Lumbar spine (LS), Soft tissue manipulation (STM), spinal manipulative therapy (SMT), Straight leg raise (SLR), Thoracic spine (TS).

## 2025-01-22 ENCOUNTER — APPOINTMENT (OUTPATIENT)
Dept: PHARMACY | Facility: HOSPITAL | Age: 35
End: 2025-01-22
Payer: COMMERCIAL

## 2025-01-22 DIAGNOSIS — E88.819 INSULIN RESISTANCE: ICD-10-CM

## 2025-01-23 DIAGNOSIS — M79.7 FIBROMYALGIA: Primary | ICD-10-CM

## 2025-01-27 ENCOUNTER — APPOINTMENT (OUTPATIENT)
Dept: SURGERY | Facility: CLINIC | Age: 35
End: 2025-01-27
Payer: COMMERCIAL

## 2025-01-27 VITALS
OXYGEN SATURATION: 98 % | HEIGHT: 61 IN | SYSTOLIC BLOOD PRESSURE: 121 MMHG | BODY MASS INDEX: 49.41 KG/M2 | DIASTOLIC BLOOD PRESSURE: 82 MMHG | HEART RATE: 103 BPM | WEIGHT: 261.7 LBS | RESPIRATION RATE: 18 BRPM

## 2025-01-27 DIAGNOSIS — Z45.2 ENCOUNTER FOR INSERTION OF VENOUS ACCESS PORT: Primary | ICD-10-CM

## 2025-01-27 PROCEDURE — 3008F BODY MASS INDEX DOCD: CPT | Performed by: STUDENT IN AN ORGANIZED HEALTH CARE EDUCATION/TRAINING PROGRAM

## 2025-01-27 PROCEDURE — 99203 OFFICE O/P NEW LOW 30 MIN: CPT | Performed by: STUDENT IN AN ORGANIZED HEALTH CARE EDUCATION/TRAINING PROGRAM

## 2025-01-27 NOTE — PROGRESS NOTES
History Of Present Illness  Patient is a 34 y.o. female who presents to discuss possible Mediport placement her past medical history is significant for fibromyalgia, associated myofascial pain syndrome and somatic dysfunction, PCOS and inflammatory arthritis.  For management of the above she does undergo infusions of ketamine, Toradol, lidocaine approximately every 2 weeks for the past year.  This has managed her symptoms quite well and she is pleased with the results and the plan is to continue these for the foreseeable future.  However over the past year, IV access has become a significant issue requiring many attempts during each of these sessions.  She also has routine laboratory checks which has also become increasingly difficult.  Given that she has been discussed with her providers about the possibility of a Mediport placement.     Past Medical History  Past Medical History:   Diagnosis Date    Abnormal Pap smear of cervix     Anxiety     Arthritis ~ 2013    Chronic pain disorder     Depression     Eczema ~ 2005    Fibromyalgia, primary     GERD (gastroesophageal reflux disease) ~2018    Headache     Headache, tension-type     HPV (human papilloma virus) infection     Joint pain     Low back pain     Memory loss     Migraine     Neck pain     Neuromuscular disorder (Multi) 2020    Numbness     Obstructive sleep apnea     Polycystic ovary syndrome     Scoliosis ~2005    Visual impairment ~2000       Surgical History  Past Surgical History:   Procedure Laterality Date    OTHER SURGICAL HISTORY  09/25/2019    Tonsillectomy with adenoidectomy    OTHER SURGICAL HISTORY  04/26/2022    No history of surgery    TONSILLECTOMY          Social History  She reports that she has never smoked. She has never used smokeless tobacco. She reports current alcohol use of about 1.0 standard drink of alcohol per week. She reports that she does not currently use drugs after having used the following drugs: Marijuana. Frequency:  4.00 times per week.    Family History  Family History   Problem Relation Name Age of Onset    Breast cancer Paternal Grandmother      Arthritis Mother Haylee     Depression Mother Haylee     Diabetes Mother Haylee     Heart disease Mother Haylee     Hypertension Mother Haylee     Learning disabilities Mother Haylee     Intellectual Disability Mother Haylee     Miscarriages / Stillbirths Mother Haylee      labor Mother Haylee     Depression Father Lowell     Diabetes Father Lowell     Drug abuse Father Lowell     Hypertension Father Lowell     Mental illness Father Lowell     Alcohol abuse Maternal Grandfather Tera     Depression Maternal Grandfather Tera     Drug abuse Maternal Grandfather Tera     Heart disease Maternal Grandfather Tera     Stroke Maternal Grandfather Tera     Cancer Maternal Grandmother Carri     Depression Maternal Grandmother Carri     Diabetes Maternal Grandmother Carri     Depression Paternal Grandfather Whit     Heart disease Paternal Grandfather Whit     Hypertension Paternal Grandfather Whit     Migraines Paternal Grandfather Whit     Cancer Sister Amina     Depression Sister Amina     Cancer Sister Cayla     Depression Sister Cayla     Drug abuse Sister Cayla     Hypertension Sister Cayla     Miscarriages / Stillbirths Sister Acyla     Depression Sister Kenzie     Depression Sister Maria L     Depression Brother Chris     Drug abuse Mother's Brother Tera     Learning disabilities Brother Crow     Intellectual Disability Brother Crow         Allergies  Patient has no known allergies.    Review of Systems  - CONSTITUTIONAL: Denies fever and chills.  - HEENT: Denies changes in vision and hearing.  - RESPIRATORY: Denies SOB and cough.  - CV: Denies palpitations and CP.  - GI: Denies any nausea or vomiting  - : Denies dysuria and urinary frequency.  - SKIN: Denies rash and pruritus.  - NEUROLOGICAL: Denies headache and syncope.     Physical Exam  - GENERAL: Alert  "and oriented x 3. No acute distress. Well-nourished.  - EYES: EOMI. Anicteric.  - HENT: Moist mucous membranes. No scleral icterus. No cervical lymphadenopathy.  - LUNGS: Breathing comfortably on room air. No accessory muscle use, no distress.  - CARDIOVASCULAR: Regular rate and rhythm. No murmur. No JVD.  - ABDOMEN: Soft, non-tender and non-distended.   - EXTREMITIES: No edema. Non-tender.  - SKIN: No rashes or lesions. Warm.  - NEUROLOGIC: No focal neurological deficits. CN II-XII grossly intact, but not individually tested.  - PSYCHIATRIC: Cooperative. Appropriate mood and affect.    Last Recorded Vitals  Blood pressure 121/82, pulse 103, resp. rate 18, height 1.549 m (5' 1\"), weight 119 kg (261 lb 11.2 oz), SpO2 98%, not currently breastfeeding.    Relevant Results  No results found.     Assessment and Plan  Patient is a 34 y.o. female who presents to discuss the possibility of a Mediport placement.  We discussed that she certainly has indications for Mediport placement given her routine laboratory checks, but more importantly her IV infusion therapy every 2 weeks.  As noted above she has had these every 2 weeks for the past year and we will continue these for the foreseeable future.  Therefore, we discussed the risks and benefits of Mediport placement.  We discussed the function as well as the basic steps of placement.  We discussed the risks of bleeding, infections requiring Mediport removal, complications with placement.  Given that she certainly has appropriate indication for Mediport placement and is a good candidate otherwise, we will proceed with placing a consult to interventional radiology for Mediport placement.  We discussed that she does not need to necessarily follow-up with me unless she were to develop any acute symptoms related to the port after placement.  Her questions were answered this time she can follow-up as needed.    Kevin Eric MD    "

## 2025-01-28 ENCOUNTER — APPOINTMENT (OUTPATIENT)
Dept: VASCULAR SURGERY | Facility: CLINIC | Age: 35
End: 2025-01-28
Payer: COMMERCIAL

## 2025-01-28 DIAGNOSIS — F41.9 ANXIETY: Primary | ICD-10-CM

## 2025-01-28 NOTE — PROGRESS NOTES
Clinical Care Team    -Referring Provider for today's visit: No ref. provider found  -Primary Care Provider: DONALD Palmer-JOYCE         History of present of illness:    Chief complaint: PCOS     Sasha Longoria is a 34 y.o. female with a PMH of MIKAELA (uses CPAP every other night), fibromyalgia, arthritis, vitiligo, NINA, MDD, GERD, PTSD, class 3 obesity (BMI 50), Vit D deficiency who presents to the  Endocrinology Clinic for concern for polycystic ovarian syndrome (PCOS).       Patient was seen in fellows clinic in 4/2024, and was diagnosed w/ PCOS based on a history of irregular menses and elevated testosterone.    Symptoms:  Hirsutism: denies  Locations: N/A  Acne: more in the past few years than ever before  Locations: face and back  Hair loss: endorses  Description: loses hair in shower and hair brush, thinning in hair line, no bald spots    Menarche: age 14  Regular menstrual cycles: irregular cycles since menarche; very erratic, 3-4 weeks of bleeding with 3-4 months in between w/ some spotting    G0, P0    Infertility - unknown, has had unprotected sex but has not become pregnant (never actively trying)  Contraception - currently using Mirena (placed 2022), previously been on multiple OCPs. On OCPs for ~12 years prior to IUD. Switched because OCP was no longer effectively regulating period and it was hard to remember to take it.    LMP: current    Current diet/nutrition plan: trying to eat more whole foods and organic foods. Trying to eliminate processed foods and plastic.    Current exercise:  PT for back pain, planning to get back to aquatic therapy     Weight trend: weight tends to fluctuate, has slowly and steadily been losing weight for the last 1.5 yrs (80 lbs). Taking zepbound 5 mg weekly      Sleep:  Hours per night: 7-8  Snoring: yes w/o CPAP  Diagnosis of MIKAELA: yes    Is planning to see behavioral sleep medicine at [facility] for help using CPAP with vivid dreams.    BMI > 35 kg/m2:  yes    Did not tolerate metformin. Significant food aversion and GI distress.     Past Medical History:   Diagnosis Date    Abnormal Pap smear of cervix     Anxiety     Arthritis ~     Chronic pain disorder     Depression     Eczema ~     Fibromyalgia, primary     GERD (gastroesophageal reflux disease) ~    Headache     Headache, tension-type     HPV (human papilloma virus) infection     Joint pain     Low back pain     Memory loss     Migraine     Neck pain     Neuromuscular disorder (Multi)     Numbness     Obstructive sleep apnea     Polycystic ovary syndrome     Scoliosis ~    Visual impairment ~       Past Surgical History:   Procedure Laterality Date    OTHER SURGICAL HISTORY  2019    Tonsillectomy with adenoidectomy    OTHER SURGICAL HISTORY  2022    No history of surgery    TONSILLECTOMY         Family History   Problem Relation Name Age of Onset    Breast cancer Paternal Grandmother      Arthritis Mother Haylee     Depression Mother Haylee     Diabetes Mother Haylee     Heart disease Mother Haylee     Hypertension Mother Haylee     Learning disabilities Mother Haylee     Intellectual Disability Mother Haylee     Miscarriages / Stillbirths Mother Haylee      labor Mother Haylee     Depression Father Lowell     Diabetes Father Lowell     Drug abuse Father Lowell     Hypertension Father Lowell     Mental illness Father Lowell     Alcohol abuse Maternal Grandfather Tera     Depression Maternal Grandfather Tera     Drug abuse Maternal Grandfather Tera     Heart disease Maternal Grandfather Tera     Stroke Maternal Grandfather Tera     Cancer Maternal Grandmother Carri     Depression Maternal Grandmother Carri     Diabetes Maternal Grandmother Carri     Depression Paternal Grandfather Whit     Heart disease Paternal Grandfather Whit     Hypertension Paternal Grandfather Whit     Migraines Paternal Grandfather Whit     Cancer Sister Amina     Depression Sister Amina      Cancer Sister Cayla     Depression Sister Cayla     Drug abuse Sister Cayla     Hypertension Sister Cayla     Miscarriages / Stillbirths Sister Cayla     Depression Sister Kenzie     Depression Sister Maria L     Depression Brother Chris     Drug abuse Mother's Brother Tera     Learning disabilities Brother Crow     Intellectual Disability Brother Crow              Current Outpatient Medications   Medication Sig Dispense Refill    alpha lipoic acid 600 mg capsule Take by mouth. 1 capsule daily after meal 1 week  Then 2 capsules for 1 week  Then 3 capsules for 1 week  Then 4 capsules daily.      buPROPion XL (Wellbutrin XL) 300 mg 24 hr tablet TAKE 1 TABLET BY MOUTH EVERY DAY IN THE MORNING AS DIRECTED      cholecalciferol (Vitamin D-3) 25 MCG (1000 UT) capsule Take 2 capsules (50 mcg) by mouth once daily.      clobetasol (Temovate) 0.05 % ointment once daily as needed.      coenzyme Q-10 (Co Q-10) 200 mg capsule Take 1 capsule (200 mg) by mouth 3 times a day.      cyanocobalamin, vitamin B-12, 1,000 mcg/mL drops Take by mouth once daily. 1 dropper per day      dihydroergotamine (Trudhesa) 0.725 mg/pump act. (4 mg/mL) nasal spray Administer 1 spray into each nostril every 1 hour if needed for migraine. Dose may be repeated in 1 hour after the first dose. No more than 2 doses within 24 hours or 3 doses within 7 days. 6 each 3    divalproex (Depakote ER) 250 mg 24 hr tablet Take 1 tablet (250 mg) by mouth once daily. 90 tablet 0    DULoxetine (Cymbalta) 60 mg DR capsule Take 1 capsule (60 mg) by mouth once daily. Do not crush or chew.      eptinezumab (Vyepti) injection Infuse 1 mL (100 mg total) into a venous catheter every 3 months. 1 mL 2    ferrous sulfate, 325 mg ferrous sulfate, tablet Take 1 tablet (325 mg) by mouth once daily with breakfast. 48 tablet 3    gabapentin (Neurontin) 300 mg capsule Take 1 capsule (300 mg) by mouth 3 times a day. 270 capsule 0    ketoconazole (NIZOral) 2 % shampoo  "Lather onto scalp and let sit for 5 mins before rinsing once daily until improvement.  May decrease to once-twice weekly for maintenance.      leflunomide (Arava) 10 mg tablet       levonorgestrel (Mirena) 21 mcg/24 hours (8 yrs) 52 mg IUD by intrauterine route.      MAGNESIUM GLYCINATE ORAL Take 1 capsule by mouth once daily.      mirtazapine (Remeron) 7.5 mg tablet Take 1 tablet (7.5 mg) by mouth once daily at bedtime.      omeprazole (PriLOSEC) 40 mg DR capsule Take 1 capsule (40 mg) by mouth once daily in the morning. Take before meals. 30 capsule 11    ondansetron (Zofran) 4 mg tablet Take 2 tablets (8 mg) by mouth every 8 hours if needed for nausea or vomiting. 20 tablet 1    prazosin (Minipress) 1 mg capsule Take 1 capsule (1 mg) by mouth once daily at bedtime.      Qulipta 60 mg tablet tablet Take 1 tablet (60 mg) by mouth once daily. 30 tablet 3    sennosides (Senokot) 8.6 mg tablet Take 1 tablet (8.6 mg) by mouth once daily. 90 tablet 3    sulfaSALAzine (Azulfidine) 500 mg DR tablet       tirzepatide, weight loss, (Zepbound) 5 mg/0.5 mL injection Inject 5 mg under the skin every 7 days. 4 each 11    triamcinolone (Kenalog) 0.1 % ointment Apply to elbows twice daily only as needed for irritation.  Do not apply more than 21 days per month.       No current facility-administered medications for this visit.       No Known Allergies    Social: no tobacco, social etoh    ROS: As is stated in the history of present illness. Otherwise reviewed in full and other ROS is negative.        Physical Exam    /80   Pulse 85   Ht 1.549 m (5' 1\")   Wt 118 kg (260 lb 5.8 oz)   BMI 49.20 kg/m²      General: she is a well-appearing female in no distress, no abnormal fat accumulation.   HEENT: atraumatic, normal pupil movements, sclera clear  Lungs: clear to auscultation, normal effort, no wheezing.   Cardiac: regular rate and rhythm without murmurs, rubs or gallops; no edema  Abdomen: soft, nontender, nondistended; " no violaceous striae but does has pink striae  Extremities: no, cyanosis or clubbing.  Neurologic: alert and oriented, non focal exam  Skin: normal consistency, vitiligo on neck, no acanthosis  Musculoskeletal: normal gait without point tenderness    Labs   Latest Reference Range & Units 02/29/24 11:39 03/13/24 08:41 06/19/24 09:42 06/19/24 16:25 10/30/24 08:14 11/07/24 07:35   FOLLICLE STIMULATING HORMONE IU/L 7.3        Hemoglobin A1C See comment % 5.1     4.6   Triiodothyronine 60 - 200 ng/dL      74   LH IU/L 11.1        Thyroid Stimulating Hormone 0.44 - 3.98 mIU/L 2.03     2.42   Estrogen pg/mL 223        Testosterone, Free 0.1 - 6.4 pg/mL 14.6 (H)        Insulin, Fasting 3 - 25 uIU/mL  18   14    GLUCOSE 74 - 99 mg/dL 100 (H)     74   Cortisol  A.M. 5.0 - 20.0 ug/dL   19.7      Cortisol, P.M. 2.5 - 10.0 ug/dL    7.9     Estimated Average Glucose Not Established mg/dL 100     85   Testosterone, Total, LC-MS/MS 2 - 45 ng/dL 84 (H)        (H): Data is abnormally high  Lab Results   Component Value Date    CORTISOL 7.9 06/19/2024     11/07/2024    K 4.4 11/07/2024     11/07/2024    CO2 32 11/07/2024    BUN 11 11/07/2024    CREATININE 0.82 11/07/2024    CALCIUM 8.8 11/07/2024    PROT 6.0 (L) 11/07/2024    BILITOT 0.4 11/07/2024    ALKPHOS 77 11/07/2024    ALT 16 11/07/2024    AST 12 11/07/2024    GLUCOSE 74 11/07/2024       Lab Results   Component Value Date    HGBA1C 4.6 11/07/2024    HGBA1C 5.1 02/29/2024    HGBA1C 5.1 02/20/2024     11/07/2024    K 4.4 11/07/2024     11/07/2024    CO2 32 11/07/2024    BUN 11 11/07/2024    CREATININE 0.82 11/07/2024    CALCIUM 8.8 11/07/2024    ALBUMIN 4.0 11/07/2024    PROT 6.0 (L) 11/07/2024    BILITOT 0.4 11/07/2024    ALKPHOS 77 11/07/2024    ALT 16 11/07/2024    AST 12 11/07/2024    GLUCOSE 74 11/07/2024    CHOL 139 11/07/2024    TRIG 82 11/07/2024    HDL 40.4 11/07/2024       Lab Results   Component Value Date    TSH 2.42 11/07/2024    M3ACLTM 74  11/07/2024    THYROIDPAB 30 10/30/2024     Imaging  TVUS 3/2024  IMPRESSION:  1. No definite ultrasound findings of polycystic ovary.  2. IUD in place    Impression & Plan  Sasha Longoria is a 34 y.o. female who is seen in clinic today for concern for Polycystic Ovarian Syndrome/Hyperandrogenism.       PCOS is diagnosed clinically based on ovulatory dysfunction (i.e menstrual irregularities suggestive of anovulatory cycles), biochemical or symptomatic evidence of hyperandrogenism (hirsuitism, acne, alopecia) and possible anatomical finding of polycystic ovaries.       - She gives a history of oligomennorhea. Cycles are now regular w/ IUD in place. Periods are starting to become heavier and more painful so she is considering IUD removal. Meeting w/ OBGYN planned for next week. She does not desire pregnancy.  - She has biochemical hyperandrogenism, but no overt clinical signs.  - She does not have a definitive history of PCO morphology on TVUS      - Exclusion of secondary causes: normal TSH, function on previous evaluation, no cortisol excess identified. Will also assess 17-OHP, PRL, and repeat AM cortisol.  - Check other secondary causes with labs as ordered  - Metabolic screening:   HgbA1c 5.1%, already on metformin  Lipids recently within reasonable range (11/2024).   NAFLD not evident  - Encouraged patient to continue following w/ sleep medicine  - Will also perform OGTT to assess for insulin resistance.    Treatment:   - Patient on zepbound, managed by clinical pharmacy. Weight loss has plateaued. Encouraged patient to increase to next dose when she is willing to.   - No need for spironolactone at this time given no physical signs of hyperandrogenism to address.      RTC: 12M      I spent a total of 70 minutes on the date of the service which included preparing to see the patient, face-to-face patient care, completing clinical documentation, obtaining and/or reviewing separately obtained history,  performing a medically appropriate examination, counseling and educating the patient/family/caregiver, and ordering medications, tests, or procedures.         Gail Reyes MD   of Medicine  Department of Internal Medicine  Division of Endocrinology, Diabetes and Metabolism  [facility]

## 2025-01-29 ENCOUNTER — INFUSION (OUTPATIENT)
Dept: INFUSION THERAPY | Facility: CLINIC | Age: 35
End: 2025-01-29
Payer: COMMERCIAL

## 2025-01-29 VITALS
DIASTOLIC BLOOD PRESSURE: 75 MMHG | SYSTOLIC BLOOD PRESSURE: 119 MMHG | RESPIRATION RATE: 10 BRPM | HEART RATE: 79 BPM | OXYGEN SATURATION: 93 % | TEMPERATURE: 97.3 F

## 2025-01-29 DIAGNOSIS — M79.7 FIBROMYALGIA: ICD-10-CM

## 2025-01-29 LAB — PREGNANCY TEST URINE, POC: NEGATIVE

## 2025-01-29 PROCEDURE — 96365 THER/PROPH/DIAG IV INF INIT: CPT | Mod: INF

## 2025-01-29 PROCEDURE — 81025 URINE PREGNANCY TEST: CPT

## 2025-01-29 PROCEDURE — 96375 TX/PRO/DX INJ NEW DRUG ADDON: CPT | Mod: INF

## 2025-01-29 PROCEDURE — 2500000004 HC RX 250 GENERAL PHARMACY W/ HCPCS (ALT 636 FOR OP/ED): Performed by: NURSE PRACTITIONER

## 2025-01-29 PROCEDURE — 96368 THER/DIAG CONCURRENT INF: CPT | Mod: INF

## 2025-01-29 RX ORDER — METOCLOPRAMIDE HYDROCHLORIDE 5 MG/ML
10 INJECTION INTRAMUSCULAR; INTRAVENOUS ONCE
OUTPATIENT
Start: 2025-02-12 | End: 2025-02-12

## 2025-01-29 RX ORDER — DIPHENHYDRAMINE HYDROCHLORIDE 50 MG/ML
25 INJECTION INTRAMUSCULAR; INTRAVENOUS ONCE
OUTPATIENT
Start: 2025-02-12 | End: 2025-02-12

## 2025-01-29 RX ORDER — KETOROLAC TROMETHAMINE 30 MG/ML
30 INJECTION, SOLUTION INTRAMUSCULAR; INTRAVENOUS ONCE
OUTPATIENT
Start: 2025-02-12 | End: 2025-02-12

## 2025-01-29 RX ORDER — KETAMINE HCL IN NACL, ISO-OSM 100MG/10ML
SYRINGE (ML) INJECTION ONCE
Status: COMPLETED | OUTPATIENT
Start: 2025-01-29 | End: 2025-01-29

## 2025-01-29 RX ORDER — EPINEPHRINE 0.3 MG/.3ML
0.3 INJECTION SUBCUTANEOUS EVERY 5 MIN PRN
OUTPATIENT
Start: 2025-02-12

## 2025-01-29 RX ORDER — NITROGLYCERIN 0.4 MG/1
0.4 TABLET SUBLINGUAL ONCE
OUTPATIENT
Start: 2025-02-12 | End: 2025-02-12

## 2025-01-29 RX ORDER — FAMOTIDINE 10 MG/ML
20 INJECTION INTRAVENOUS ONCE AS NEEDED
OUTPATIENT
Start: 2025-02-12

## 2025-01-29 RX ORDER — ALBUTEROL SULFATE 0.83 MG/ML
3 SOLUTION RESPIRATORY (INHALATION) AS NEEDED
OUTPATIENT
Start: 2025-02-12

## 2025-01-29 RX ORDER — DIPHENHYDRAMINE HYDROCHLORIDE 50 MG/ML
50 INJECTION INTRAMUSCULAR; INTRAVENOUS AS NEEDED
OUTPATIENT
Start: 2025-02-12

## 2025-01-29 RX ORDER — METOPROLOL TARTRATE 25 MG/1
25 TABLET, FILM COATED ORAL ONCE
OUTPATIENT
Start: 2025-02-12 | End: 2025-02-12

## 2025-01-29 RX ORDER — ONDANSETRON HYDROCHLORIDE 2 MG/ML
4 INJECTION, SOLUTION INTRAVENOUS ONCE
OUTPATIENT
Start: 2025-02-12 | End: 2025-02-12

## 2025-01-29 RX ORDER — KETOROLAC TROMETHAMINE 30 MG/ML
30 INJECTION, SOLUTION INTRAMUSCULAR; INTRAVENOUS ONCE
Status: COMPLETED | OUTPATIENT
Start: 2025-01-29 | End: 2025-01-29

## 2025-01-29 RX ORDER — KETAMINE HCL IN NACL, ISO-OSM 100MG/10ML
SYRINGE (ML) INJECTION ONCE
OUTPATIENT
Start: 2025-02-12

## 2025-01-29 RX ADMIN — Medication: at 08:45

## 2025-01-29 RX ADMIN — KETOROLAC TROMETHAMINE 30 MG: 30 INJECTION, SOLUTION INTRAMUSCULAR at 08:41

## 2025-01-29 RX ADMIN — PROPOFOL 100 MG: 10 INJECTION, EMULSION INTRAVENOUS at 08:44

## 2025-01-29 ASSESSMENT — ENCOUNTER SYMPTOMS
DEPRESSION: 0
LOSS OF SENSATION IN FEET: 1
OCCASIONAL FEELINGS OF UNSTEADINESS: 0

## 2025-01-29 ASSESSMENT — PAIN SCALES - GENERAL
PAINLEVEL_OUTOF10: 2
PAINLEVEL_OUTOF10: 5
PAINLEVEL_OUTOF10: 5 - MODERATE PAIN

## 2025-01-29 NOTE — PROGRESS NOTES
S: Patient here for 27th opioid sparing pain infusion. Patient reports 60-75% reduction in pain after last infusion that lasted 2 weeks.    Purpose of pain infusion meds explained along with potential side effects.  Patient verbalized understanding.    B: Pain Issues. generalized    Is Patient breast feeding: no    A: Patient currently has pain described on flow sheet documentation. Designated  is [employer] [phone]. Patient last ate solid food >12 hours ago, and had liquid 1 hours ago.    R: Plan; Obtain IV access, do patient risk assessment, and start opioid sparing infusion as ordered. Monitoring for S/S of adverse reactions.    Post infusion teaching provided. Patient verbalized understanding. VSS, Patient states pain is 2/10. Will assist patient to waiting car via wheelchair.

## 2025-01-30 ENCOUNTER — EVALUATION (OUTPATIENT)
Dept: PHYSICAL THERAPY | Facility: CLINIC | Age: 35
End: 2025-01-30
Payer: COMMERCIAL

## 2025-01-30 DIAGNOSIS — M54.41 CHRONIC LOW BACK PAIN WITH BILATERAL SCIATICA, UNSPECIFIED BACK PAIN LATERALITY: Primary | ICD-10-CM

## 2025-01-30 DIAGNOSIS — Q67.5 CONGENITAL SCOLIOSIS: ICD-10-CM

## 2025-01-30 DIAGNOSIS — M54.42 CHRONIC LOW BACK PAIN WITH BILATERAL SCIATICA, UNSPECIFIED BACK PAIN LATERALITY: Primary | ICD-10-CM

## 2025-01-30 DIAGNOSIS — G89.29 CHRONIC LOW BACK PAIN WITH BILATERAL SCIATICA, UNSPECIFIED BACK PAIN LATERALITY: Primary | ICD-10-CM

## 2025-01-30 PROCEDURE — 97162 PT EVAL MOD COMPLEX 30 MIN: CPT | Mod: GP

## 2025-01-30 PROCEDURE — 97112 NEUROMUSCULAR REEDUCATION: CPT | Mod: GP

## 2025-01-30 ASSESSMENT — COLUMBIA-SUICIDE SEVERITY RATING SCALE - C-SSRS
1. IN THE PAST MONTH, HAVE YOU WISHED YOU WERE DEAD OR WISHED YOU COULD GO TO SLEEP AND NOT WAKE UP?: NO
2. HAVE YOU ACTUALLY HAD ANY THOUGHTS OF KILLING YOURSELF?: NO
6. HAVE YOU EVER DONE ANYTHING, STARTED TO DO ANYTHING, OR PREPARED TO DO ANYTHING TO END YOUR LIFE?: NO

## 2025-01-30 ASSESSMENT — ENCOUNTER SYMPTOMS
OCCASIONAL FEELINGS OF UNSTEADINESS: 0
DEPRESSION: 0
LOSS OF SENSATION IN FEET: 0

## 2025-01-30 ASSESSMENT — PATIENT HEALTH QUESTIONNAIRE - PHQ9
2. FEELING DOWN, DEPRESSED OR HOPELESS: NOT AT ALL
1. LITTLE INTEREST OR PLEASURE IN DOING THINGS: NOT AT ALL
SUM OF ALL RESPONSES TO PHQ9 QUESTIONS 1 AND 2: 0

## 2025-01-30 NOTE — PROGRESS NOTES
Physical Therapy Evaluation  Patient Name Verena Longoria  MRN: [id number]  Today's Date: 1/30/2025  Time Calculation  Start Time: 0915  Stop Time: 0959  Time Calculation (min): 44 min    Insurance:   Visit #: 1  Insurance Reviewed: REMINGTON  (per information provided by  pre-cert team)   Authorization required:  N  60 PT/OT/ST V PCY 0 USED     Therapy Diagnoses:   Problem List Items Addressed This Visit             ICD-10-CM    Chronic low back pain with sciatica - Primary M54.40, G89.29    Relevant Orders    Follow Up In Physical Therapy     Other Visit Diagnoses         Codes    Congenital scoliosis     Q67.5    Relevant Orders    Follow Up In Physical Therapy            General:  Reason for visit: neck and back pain   Referred by: DONALD Avalos-CNP  Next MD appt:    Preferred Name:  VERENA  Script:  PT  Onset Date:  9/25/24  Most recent assessment/re-assessment: 1/30/25    Subjective:  HPI: Always had back pain, starting when pretty young. While back asked PCP for referral d/t increase in pain. Neurology/neurosurgery said I have a degree of scoliosis. Upper back bothering me the most. Neurosurgery recommended coming back to PT d/t time lapse. PT has been helpful in the past. Back pain is all over right now. Carries stress more in upper back and headaches affect upper back pain. Intermittent N/T in distal appendages in hands and feet, related to fibromyalgia. For a long while to [facility] but had recent difficulty d/t work and hrs. Plans to go back in the next month. Increased difficulty with increased flights of stairs and/or carrying items up/down the stairs. Increased pain in knees with crouching.     Pain  #: 3-4/10 (avg)  Type of pain:  achey, sore, stiff  What increases pain: prolonged sitting, standing, walking  What decreases pain:      Medical Hx  Fall Risk:  increased risk d/t recent h/o falls in past yr  Steadi:  3 yes  Precautions: HA, migraines, fibromyalgia, scoliosis, PCOS,  "arthritis    Human Trafficking risk:  No    Patient goal:  \"reduce pain and stiffness in back and neck  Patient is aware of diagnosis and prognosis.    Living Environment:  stairs  Social Support: friends and family near-by  lives with:  alone with fur babies  DME:         Prior Function:  independent with all ADLs and IADLs with less upper back pain    Function:    A and O x 3  Sleep:  instructed in proper postures.  ADL's: showers can be hard depending on pain and stiffness  Chores: one of the most difficult things is dishes increase lower back pain, making bed increases pain, keeping up with chores is hard as takes long time, have to pace self.  Driving: only trouble when really long road trip  Work:  - sitting in office chair, difficult to find a comfortable position while working  Recreation: can't really do any sports d/t increased residual pain, traveling is hard,    Sittin min  Standing: hours but increases pain     Walkinmin    Objective:    Outcome Measures:  Other Measures  Oswestry Disablity Index (IMANI): 18/50     Posture: seated with mod forward head and rounded shoulders    Gait/Stairs: ambulates independently with reciprocal step and stride length, B supination R>L    Palpation:  mod soreness R UT/LS/supraspinatus, min soreness L UT/LS/supraspinatus, min soreness B RTC, min soreness across B QL and lumbar paraspinals    ROM:  Cervical   flexion: 35  Ext: 35  RSB:  LSB:  RR: 50  LR: 50    Shoulder: grossly WFL     Trunk Flexion:   Extension:  RSB:    LSB:    RR:  LR:    MMT:  Shoulder  Supra: B 5/5  flexors: B 4+/5  Extensors: B 4+/5  abductors: B 4+/5    Elbow  Flex: B 4+/5  Ext: B 4+/5    Upper traps: R 4/5 L 4/5 with p!  Mid traps/rhomboids: B 4+/5  Lower traps:    Serratus Anterior: B 4/5    Hip  Flex: B 4/5  Ext: B 4/5  SL ABD: B 4/5    Knee  Flex: B 4/5  Ext: B 4+/5    Flexibility:  Pectoralis: min decrease    Neuro:  (at re-assessment)    Treatment:    Evaluation:  " "32  minutes    **= HEP  NV= Next visit  np= not performed  nb= non-billable  G= group HEP= discharged to HEP    Therapeutic Exercise:    Nu-step(subjective taken/education):    NV  Standing hams and hip flexor/calf stretches: NV  Seated flexion stretch:  NV  LTR:  x 15/5\"  NV  B SKTC:  10/10\" NV  B supine piriformis stretch:  3/30\" NV  UT 2x30\" ea. NV  LS 2x30\" ea. NV    NMR:  10  T-band 3-way pull downs:  NV  T-band obliques: NV  T-band B pull for/back:  NV    Pelvic tilt:  10/5\"**  TA march 10x5\" **  Hor abd/ER/D2 x10 red TB **    Bridges:  NV  PT hooklying with add set:  NV  PT hooklying with t-band abd:  NV     Self Care Home Management:  2 minutes  Education:  poc, anatomy, physiology, posture, body mechanics, safety awareness, HEP.  Preferred learning:  pictures, demonstration.  Demonstrated good understanding.                                        Assessment:    Stable and/or uncomplicated characteristics  Level of complexity:  mod  Patient presents with flare up of OA in the CS/LS, signs and symptoms are consistent with diagnosis and patient is an excellent candidate for Physical Therapy and needs work on/Skilled PT for ROM, flexibility, dynamic stabilization, strength, posture, body mechanics and gait/stairs for improved overall function.       Problems:    Pain:  _x__  Posture/Body mechanics deficits:  __x_  Decreased knowledge HEP:  __x_  Decreased knowledge of Precautions:  ___  Activity Limitations:   __x_  ADL's/IADL's/Self-care skills:  _x__  ROM/Joint Mobility:  _x__  Strength:  _x__  Decreased functional level:   _x__  Flexibility:  _x__  Tenderness/decreased soft tissue mobility:  _x__  Gait/Stairs:  _x__  Fall Risk:  _x__  Balance:  ___  Edema:  ___  Participation restrictions:  __x_  Sensory:  ___  Transfers:  ___  Decreased knowledge of brace:   ___  Other:  ___    X Indicates included in problem list    Goals:    STG:    -Increased postural awareness  -Compliant with HEP.   LTG:  by " discharge  -Increased postural awareness and posture WFL.  - pain to: 0-1 and patient I with self management of symptoms.   -Decreased pain and increased function with ADL's and IADL's.  Improve Oswestry to: 16 %  limitation of function.  -Normal gait on level and uneven surfaces community level distances for improved function in the community.  -Normal reciprocal pattern on stairs for improved function to all levels of home.    -Increase trunk AROM to WFL for improved function with dressing and driving.    -Increase trunk strength and stability and R/L LE strength to WFL for improved function with chores.  -Increase B LE flexibility to WFL.    -Decrease B /lower quarter tenderness 50-75% per patient report.  -Decrease R/L LE radicular symptoms 50-75% per patient report.    -I and compliant with HEP and proper: LE/lower back care.     Rehab potential to achieve the above goals is good.    Patient is aware of diagnosis and prognosis and agrees with established plan of care and goals.    Plan:   Skilled PT 1-2 x/week for 6 weeks (Until goals achieved, maximum rehab potential has been achieved, or patient has plateaued) for:    Aquatics  _____  CP   __x__  Dry needling  __x__  Education  __x__  Electrical Stimulation  __x__  Gait training  ____  HEP  _x___  Manual  __x__  Mechanical Traction  __x__  NMR  __x__  Self-care/home management  __x__  Therapeutic Exercise   _x___  Therapeutic Activities  __x__  US  __x__  Work Conditioning  ____  Re-assessment  __x__  Other  ____    X Indicates included in treatment plan    PT for Nu-step/SCIFIT for functional mobility and soft tissue warm up for more efficient stretching, work on ROM, flexibility, dynamic stabilization, strength, posture, body mechanics and gait/stairs for improved overall function.    Access Code: [email or web address]  URL: [email or web address]  Date: 2025  Prepared by: Shirley Simms    Exercises  - Supine Posterior Pelvic Tilt   - 1 x daily - 3-5 x weekly - 1 sets - 10 reps - 5 hold  - Supine March with Posterior Pelvic Tilt  - 1 x daily - 3-5 x weekly - 1 sets - 10-15 reps  - Supine Chin Tuck  - 1 x daily - 3-5 x weekly - 1 sets - 10 reps - 5 hold  - Seated Scapular Retraction  - 1 x daily - 3-5 x weekly - 1 sets - 10 reps - 5 hold  - Seated Shoulder Horizontal Abduction with Resistance  - 1 x daily - 3-5 x weekly - 1-2 sets - 8-10 reps  - Standing Shoulder Single Arm PNF D2 Flexion with Resistance  - 1 x daily - 3-5 x weekly - 1-2 sets - 8-10 reps

## 2025-01-31 ENCOUNTER — OFFICE VISIT (OUTPATIENT)
Age: 35
End: 2025-01-31
Payer: COMMERCIAL

## 2025-01-31 VITALS
DIASTOLIC BLOOD PRESSURE: 80 MMHG | HEART RATE: 85 BPM | HEIGHT: 61 IN | SYSTOLIC BLOOD PRESSURE: 122 MMHG | BODY MASS INDEX: 49.16 KG/M2 | WEIGHT: 260.36 LBS

## 2025-01-31 DIAGNOSIS — E28.2 POLYCYSTIC OVARIES: Primary | ICD-10-CM

## 2025-01-31 PROCEDURE — 99215 OFFICE O/P EST HI 40 MIN: CPT | Performed by: STUDENT IN AN ORGANIZED HEALTH CARE EDUCATION/TRAINING PROGRAM

## 2025-01-31 PROCEDURE — 99417 PROLNG OP E/M EACH 15 MIN: CPT | Performed by: STUDENT IN AN ORGANIZED HEALTH CARE EDUCATION/TRAINING PROGRAM

## 2025-01-31 PROCEDURE — 3008F BODY MASS INDEX DOCD: CPT | Performed by: STUDENT IN AN ORGANIZED HEALTH CARE EDUCATION/TRAINING PROGRAM

## 2025-01-31 PROCEDURE — G2211 COMPLEX E/M VISIT ADD ON: HCPCS | Performed by: STUDENT IN AN ORGANIZED HEALTH CARE EDUCATION/TRAINING PROGRAM

## 2025-01-31 ASSESSMENT — PATIENT HEALTH QUESTIONNAIRE - PHQ9
2. FEELING DOWN, DEPRESSED OR HOPELESS: NOT AT ALL
SUM OF ALL RESPONSES TO PHQ9 QUESTIONS 1 AND 2: 0
1. LITTLE INTEREST OR PLEASURE IN DOING THINGS: NOT AT ALL

## 2025-01-31 ASSESSMENT — ENCOUNTER SYMPTOMS
DEPRESSION: 0
LOSS OF SENSATION IN FEET: 1
OCCASIONAL FEELINGS OF UNSTEADINESS: 0

## 2025-02-03 ENCOUNTER — APPOINTMENT (OUTPATIENT)
Dept: OBSTETRICS AND GYNECOLOGY | Facility: CLINIC | Age: 35
End: 2025-02-03
Payer: COMMERCIAL

## 2025-02-03 VITALS — HEIGHT: 61 IN | DIASTOLIC BLOOD PRESSURE: 85 MMHG | BODY MASS INDEX: 49.2 KG/M2 | SYSTOLIC BLOOD PRESSURE: 132 MMHG

## 2025-02-03 DIAGNOSIS — N93.9 ABNORMAL UTERINE BLEEDING: Primary | ICD-10-CM

## 2025-02-03 DIAGNOSIS — G43.009 MIGRAINE WITHOUT AURA AND WITHOUT STATUS MIGRAINOSUS, NOT INTRACTABLE: Primary | ICD-10-CM

## 2025-02-03 PROCEDURE — 99214 OFFICE O/P EST MOD 30 MIN: CPT | Performed by: OBSTETRICS & GYNECOLOGY

## 2025-02-03 PROCEDURE — 1036F TOBACCO NON-USER: CPT | Performed by: OBSTETRICS & GYNECOLOGY

## 2025-02-03 RX ORDER — FREMANEZUMAB-VFRM 225 MG/1.5ML
225 INJECTION SUBCUTANEOUS
Qty: 1.5 ML | Refills: 2 | Status: SHIPPED | OUTPATIENT
Start: 2025-02-03

## 2025-02-03 RX ORDER — NORETHINDRONE 5 MG/1
5 TABLET ORAL DAILY
Qty: 90 TABLET | Refills: 3 | Status: SHIPPED | OUTPATIENT
Start: 2025-02-03 | End: 2026-02-03

## 2025-02-03 ASSESSMENT — ENCOUNTER SYMPTOMS
DIZZINESS: 0
SHORTNESS OF BREATH: 0
NAUSEA: 0
AGITATION: 0
FATIGUE: 1
VOMITING: 0
COLOR CHANGE: 0
DIFFICULTY URINATING: 0
DIARRHEA: 0
DYSURIA: 0
FEVER: 0

## 2025-02-03 ASSESSMENT — PAIN SCALES - GENERAL: PAINLEVEL_OUTOF10: 0-NO PAIN

## 2025-02-03 NOTE — PROGRESS NOTES
Assessment/Plan   Today's presentation is consistent with fibromyalgia/myofascial pain syndrome alongside postural strain and somatic dysfunction contributing to her pain syndrome, also has been diagnosed with inflammatory arthritis (not rheumatoid), PCOS, and has had elevated ESR/CRP and been under rheumatologic care.  Complicating factors include chronicity of symptoms as well as body habitus.  We will implement a trial of care to include various manipulative techniques which will range from instrument assisted to diversified.  We will utilize soft tissue techniques such as active release technique to the cervical spine musculature.  We will closely monitor their response to care to determine if any changes need to occur, if advanced imaging is needed, or if referral to other providers is appropriate. She will also be receiving acupuncture and integrative medicine consultation which is appropriate when considering her overall presentation     Full spine EOS radiographs 2024 - 12 degree dextroconvex scoliosis from T11 to L3. Hyperkyphosis in TS and hyperlordosis in LS    Brain MRI 2024 - IMPRESSION:  1. No MR evidence of acute intracranial infarct, hemorrhage, mass, or  mass effect.  2. Nonspecific 6 mm white matter FLAIR signal hyperintensity adjacent  to the trigone of the right lateral ventricle, which may be  indicative of minimal infectious inflammatory change, migraine  disorder, demyelinating disease, or vasculitis for instance in the  appropriate clinical context. Consider follow-up exam if clinically  indicated.    LS radiographs 2024 - IMPRESSION:  No acute pathologic findings are identified.    CS radiographs 2024 - IMPRESSION:  No pathologic findings are identified.    TS radiographs 2024 - IMPRESSION:  No acute pathologic findings are identified.  Thoracic lumbar dextroscoliosis.  Lower thoracic mild spondylosis.    Visits this year: 3 (under new insurance)    Subjective   Patient reports frequent  moderate pain and tightness in the neck middle and lower back locally without radiation.  She had 1 severe migraine recently that involved more neck and upper back pain than typical however she has had similar symptoms previously it is not as focused on the neck and the head and it was recently.  She notes that she is weaning off several medications in conjunction with her other practitioners and wonders if this may be leading to some side effects as well is generally she has had an increase in headache severity over the past week or 2.  Today is the first day that she does not have a headache in a while.  She is continuing to reduce and taper off medications gradually in conjunction with providers guidance.  He is looking to get on a different migraine prevention medication however does plan to continue with the ketamine infusions.    HPI - 10/05/23 (CR): the patient presents today per the referral of Dr. Suárez and pain management for evaluation and and management of chronic full spine complaints. She has in the past been diagnosed with fibromyalgia. She has dealt with pain for several years and has received chiropractic care in the past. She did have mixed results with chiropractic care in the past, reporting a variation of instrument assisted manipulation and manual manipulation. But she wished to attend chiropractic care again through a hospital-based provider. She is under the care of pain management and will be starting infusions next week. She is also on the wait list for infusions at [facility] if needed. Overall, her pain levels remain moderate to severe. Her pain is constant. Symptoms in the lower back and hips seem to be the area of most intensity. But she does continue to experience pain throughout the spine. She experiences paresthesias in the upper and lower extremities bilaterally. She has difficulty finding any comfortable positions     Review of Systems   Constitutional:  Positive for  fatigue. Negative for fever.   Eyes:  Negative for visual disturbance.   Respiratory:  Negative for shortness of breath.    Cardiovascular:  Negative for chest pain.   Gastrointestinal:  Negative for diarrhea, nausea and vomiting.   Genitourinary:  Negative for difficulty urinating and dysuria.   Skin:  Negative for color change.   Neurological:  Negative for dizziness.   Psychiatric/Behavioral:  Negative for agitation.    All other systems reviewed and are negative.    Objective   Examination findings (e.g., palpation & ROM): Decreased C/S and L/S ROM with pain, hypertonic and tender cervical and lumbar erectors, BL upper trapezius  SLR BL 70    Segmental joint dysfunction was identified in the following areas using motion palpation and/or pain provocation assessment:  Cervical: 2  Thoracic: 5-9  Lumbopelvic: 1-3, BL SIJ      Physical Exam  Neurological:      General: No focal deficit present.      Mental Status: She is alert.      Motor: Motor function is intact.      Coordination: Coordination is intact.      Gait: Gait is intact.      Deep Tendon Reflexes: Reflexes are normal and symmetric.      Reflex Scores:       Tricep reflexes are 2+ on the right side and 2+ on the left side.       Bicep reflexes are 2+ on the right side and 2+ on the left side.       Brachioradialis reflexes are 2+ on the right side and 2+ on the left side.       Patellar reflexes are 2+ on the right side and 2+ on the left side.       Achilles reflexes are 2+ on the right side and 2+ on the left side.     Comments: Trace Jaylin's BL  9/3/24       Plan   Today's treatment:  SMT to regions of segmental dysfunction identified on exam, using age-appropriate force, and manual diversified technique.   Mobilization on CS  STM to patient tolerance to hypertonic paraspinal muscles, upper trapezius BL  Manual dynamic stretching of the bl gluteal mm & hamstrings  10:41 to 10:56 AM  Patient noted improved mobility and reduced pain  post-treatment    Treatment Plan:   The patient and I discussed the risks and benefits of chiropractic care. Based on the patient's subjective complaints along with the examination findings, it is advised that a course of chiropractic treatment be initiated. The patient provided consent for care. The patient tolerated today's treatment with little or no additional discomfort and was instructed to contact the office for questions or concerns. Will see patient once per week then every 2 weeks when symptoms become mild/manageable, further spaced apart contingent upon improvement.     This chart note was generated using dictation software, and as such, there may be typographical errors present. Abbreviations: Cervical spine (CS), cervical-thoracic (CT), Dry needling (DN), Flexion adduction internal rotation (FAIR), high velocity, low amplitude (HVLA), Lumbar spine (LS), Soft tissue manipulation (STM), spinal manipulative therapy (SMT), Straight leg raise (SLR), Thoracic spine (TS).

## 2025-02-03 NOTE — PROGRESS NOTES
"AUB with Mirena IUD  Placed: 8/25/2022  LMP: unknown   C/O: consistent AUB & cramping, discuss     Meeta Negrete MA II    GYN follow up visit     HPI: Pt presents for follow up visit.  She had a Mirena IUD placed in 8/2022.   She initially had no bleeding for 2 years.  She then started having bleeding.  She has had some bleeding which seemed like a period.  She has also had lighter bleeding/spotting intermittently.  It is becoming very bothersome to her.  She is unsure what she wants to do about it at this point.  She has tried many different forms of OCPs in the past and not been happy with them for various reasons/side effects.      She wants to discuss her options today.     O:  /85 (BP Location: Right arm, Patient Position: Sitting)   Ht 1.549 m (5' 1\")   LMP  (LMP Unknown) Comment: Mirena IUD 8/25/2022  BMI 49.20 kg/m²      Physical exam:   General Appearance   - consistent with stated age, well groomed and cooperative    Integumentary  - skin warm and dry without rash    Head and Neck  - normalocephalic and neck supple    Chest and Lung Exam  - normal breathing effort, no respiratory distress    Female Genitourinary  - Speculum exam done - IUD strings seen - normal length.  Bedside transabdominal USN done and IUD seen clearly and appeared to be in the fundal position.     Peripheral Vascular  - no edema present    A/P: 34 y.o. female with Mirena IUD and persistent AUB.     - IUD appears appropriately positioned today on USN.   Strings seen.   - Offered trial of daily norethindrone for menstrual suppression to see if it helps with her breakthrough bleeding.    - She may want to discuss definitive surgical management if symptoms not improved on oral progesterone. F/u in 3 months for reassessment.     30 minutes were spent on this patient encounter today. 5 minutes was spent reviewing the patient's chart and history.  20 minutes was spent face to face obtaining a history, making a plan, providing " counseling, and answering questions.  5 minutes was spent charting after the face to face encounter with the patient.

## 2025-02-04 ENCOUNTER — APPOINTMENT (OUTPATIENT)
Dept: INTEGRATIVE MEDICINE | Facility: CLINIC | Age: 35
End: 2025-02-04
Payer: COMMERCIAL

## 2025-02-04 ENCOUNTER — SPECIALTY PHARMACY (OUTPATIENT)
Dept: PHARMACY | Facility: CLINIC | Age: 35
End: 2025-02-04

## 2025-02-04 DIAGNOSIS — M54.2 NECK PAIN: ICD-10-CM

## 2025-02-04 DIAGNOSIS — M79.10 MYALGIA: ICD-10-CM

## 2025-02-04 DIAGNOSIS — M99.02 SOMATIC DYSFUNCTION OF THORACIC REGION: ICD-10-CM

## 2025-02-04 DIAGNOSIS — M99.03 SOMATIC DYSFUNCTION OF LUMBAR REGION: ICD-10-CM

## 2025-02-04 DIAGNOSIS — M54.40 CHRONIC LOW BACK PAIN WITH SCIATICA, SCIATICA LATERALITY UNSPECIFIED, UNSPECIFIED BACK PAIN LATERALITY: ICD-10-CM

## 2025-02-04 DIAGNOSIS — M99.01 SOMATIC DYSFUNCTION OF CERVICAL REGION: ICD-10-CM

## 2025-02-04 DIAGNOSIS — G89.29 CHRONIC LOW BACK PAIN WITH SCIATICA, SCIATICA LATERALITY UNSPECIFIED, UNSPECIFIED BACK PAIN LATERALITY: ICD-10-CM

## 2025-02-04 DIAGNOSIS — R51.9 CHRONIC DAILY HEADACHE: ICD-10-CM

## 2025-02-04 DIAGNOSIS — M99.04 SACROILIAC JOINT SOMATIC DYSFUNCTION: Primary | ICD-10-CM

## 2025-02-04 PROCEDURE — 97112 NEUROMUSCULAR REEDUCATION: CPT | Performed by: CHIROPRACTOR

## 2025-02-04 PROCEDURE — RXMED WILLOW AMBULATORY MEDICATION CHARGE

## 2025-02-04 PROCEDURE — 98941 CHIROPRACT MANJ 3-4 REGIONS: CPT | Performed by: CHIROPRACTOR

## 2025-02-05 ENCOUNTER — PHARMACY VISIT (OUTPATIENT)
Dept: PHARMACY | Facility: CLINIC | Age: 35
End: 2025-02-05
Payer: COMMERCIAL

## 2025-02-05 ENCOUNTER — TREATMENT (OUTPATIENT)
Dept: PHYSICAL THERAPY | Facility: CLINIC | Age: 35
End: 2025-02-05
Payer: COMMERCIAL

## 2025-02-05 DIAGNOSIS — M54.42 CHRONIC LOW BACK PAIN WITH BILATERAL SCIATICA, UNSPECIFIED BACK PAIN LATERALITY: ICD-10-CM

## 2025-02-05 DIAGNOSIS — G89.29 CHRONIC LOW BACK PAIN WITH BILATERAL SCIATICA, UNSPECIFIED BACK PAIN LATERALITY: ICD-10-CM

## 2025-02-05 DIAGNOSIS — M54.41 CHRONIC LOW BACK PAIN WITH BILATERAL SCIATICA, UNSPECIFIED BACK PAIN LATERALITY: ICD-10-CM

## 2025-02-05 DIAGNOSIS — Q67.5 CONGENITAL SCOLIOSIS: ICD-10-CM

## 2025-02-05 PROCEDURE — 97110 THERAPEUTIC EXERCISES: CPT | Mod: GP,CQ

## 2025-02-05 PROCEDURE — 97112 NEUROMUSCULAR REEDUCATION: CPT | Mod: GP,CQ

## 2025-02-05 NOTE — PROGRESS NOTES
"Physical Therapy  Physical Therapy Progress Note    Patient Name Verena Longoria   MRN: [id number]  Today's Date: 2/5/2025  Time Calculation  Start Time: 0445  Stop Time: 0525  Time Calculation (min): 40 min    Insurance:    Visit #:   2   Insurance Reviewed: [employer]INGTON  (per information provided by  pre-cert team)   Authorization required:  N  60 PT/OT/ST V PCY 0 USED   Therapy Diagnoses:   Problem List Items Addressed This Visit             ICD-10-CM    Chronic low back pain with sciatica M54.40, G89.29    Congenital scoliosis Q67.5       General:  Reason for visit: neck and back pain   Referred by: DONALD Avalos-CNP  Next MD appt:    Preferred Name:  VERENA  Script:  PT  Onset Date:  9/25/24  Most recent assessment/re-assessment: 1/30/25     Subjective:   Patient reports:  today the pain  is in her upper back , though other times can be in the LB as well . Compliant with HEP, feels they are easy.  Reports \"Overall my capacity to do physical things in general has improved\"  Went to chiropractor yesterday , adjusted back and did massage on the nerve endings.  Have you fallen since last visit:  no    Precautions:    Fall Risk:  increased risk d/t recent h/o falls in past yr  Steadi:  3 yes  Precautions: HA, migraines, fibromyalgia, scoliosis, PCOS, arthritis     Pain:  2/10  Location/Type of pain:  upper back   type: sore aching     HEP compliance/understanding:  patient reports compliance with the instructed home exercises.    Objective:   Objective Measurements:    Function:    Improved physical capacity per pt. Report.  Posture:   Gait:  Balance:   ROM:    Strength: Shuttle 50# mann LE strength  Flexibility:      Treatment:   **= HEP progression today NV= Next visit  np= not performed  nb= non-billable  G= group HEP= discharged to HEP  Therapeutic Exercise:     30 minutes  Nu-step(subjective taken/education):    L3 6'  Standing hams and hip flexor/calf stretches: 30 sec /3 **  Seated flexion stretch:  10 sec " "5x **  LTR:  x 15/5\"  NV  B SKTC:  10/10\" NV  B supine piriformis stretch:  3/30\" NV  UT 2x30\" ea. **  LS 2x30\" ea. **  Shuttle mann LE's 50# 2 x 12     Manual Therapy:       minutes      Neuromuscular Re-education:    10 minutes  T-band 3-way pull downs:  NV  T-band obliques: NV  T-band B pull for/back:  NV   supine serratus punch mann 1# 2 x 10 **  Pelvic tilt:  10/5\"  TA march  with feet not returning to mat  **10 +5  **  Hor abd/ER/D2 x10 red TB HEP     Bridges:  NV  PT hooklying with add set:  NV  PT hooklying with t-band abd:  NV            Therapeutic Activity:      minutes      Modalities:       Vasopneumatic Device       minutes  Electrical Stimulation          minutes  Ultrasound            minutes  Iontophoresis                     minutes  Cold Pack            minutes  Mechanical Traction           minutes    Self Care Home Management:    minutes          Re-Evaluation:   minutes      Education:    Exercise progression  Assessment:  Patient tolerated treatment well, did well with progression this date.  Added flexibility and strengthening with positive response. Pain decreased to 1 post rx. Updated HEP.  Patient needs continued work on/skilled PT for: ROM/flexibility /strengthening to address remaining functional, objective and subjective deficits to allow them to return to prior /optimal level of function with ADLs.  Patient is progressing with goals: compliant with HEP  Skilled care:  exercise progression    Plan:    Continue to progress per poc:   NV initiate tband exercises    HEP:    Access Code: [email or web address]  URL: [email or web address]  Date: 02/05/2025  Prepared by: Coco    Exercises  - Supine Posterior Pelvic Tilt  - 1 x daily - 3-5 x weekly - 1 sets - 10 reps - 5 hold  - Supine March with Posterior Pelvic Tilt  - 1 x daily - 3-5 x weekly - 1 sets - 10-15 reps  - Supine Chin Tuck  - 1 x daily - 3-5 x weekly - 1 sets - 10 reps - 5 hold  - Seated Scapular Retraction  - 1 x daily - " 3-5 x weekly - 1 sets - 10 reps - 5 hold  - Seated Shoulder Horizontal Abduction with Resistance  - 1 x daily - 3-5 x weekly - 1-2 sets - 8-10 reps  - Standing Shoulder Single Arm PNF D2 Flexion with Resistance  - 1 x daily - 3-5 x weekly - 1-2 sets - 8-10 reps  - Standing Hamstring Stretch on Chair  - 1 x daily - 7 x weekly - 1 sets - 3 reps - 30 hold  - Hip Flexor Stretch with Chair  - 1 x daily - 7 x weekly - 1 sets - 3 reps - 30 hold  - Seated Upper Trapezius Stretch  - 1 x daily - 7 x weekly - 1 sets - 3 reps - 30 hold  - Seated Levator Scapulae Stretch  - 1 x daily - 7 x weekly - 1 sets - 3 reps - 30 hold  - Seated Lumbar Flexion Stretch  - 1 x daily - 7 x weekly - 1 sets - 5 reps - 10 hold  - Supine Scapular Protraction in Flexion with Dumbbells  - 1 x daily - 7 x weekly - 1-2 sets - 10 reps - 5 hold

## 2025-02-05 NOTE — PATIENT INSTRUCTIONS
After Visit Summary  It was great seeing you today!    In summary from our visit today, I would like to remind you of the following:    - be on the look out for a call from our lab to set up oral glucose tolerance test  - please obtain 8AM fasting labs at your convenience  - talk to your OBGYN about protection from endometrial hyperplasia if you decide to discontinue Mirena      Division of Endocrinology   Follow-up appointments:  Please arrive at least 20 minutes prior to your follow up appointments in order to keep visits as close as possible to the scheduled times. If you need to cancel an appointment, please call at least 24 hours in advance.    Please bring your medications or an updated list of medications to each of your visits to help ensure safety in prescribing future medications.    If you are being seen for diabetes, please bring your glucose meter and/or glucose log with 2 weeks of glucose readings to each visit.    Medication Refills: Please request medication refills at the time of your appointment.   - Refills requested by phone or [employer] in between visits require at least 3 business days to be processed.    Lab Results: Please allow at least 7 business days for lab results to be reviewed.  - Please note, that some specialty testing may take up to at least 7 business to be completed.   - If there are any urgent issues requiring immediate attention, we will contact you at your provided phone numbers.   - Any non-urgent results will be relayed via [employer] if you have signed up for this service or via a letter through the mail and/or phone call.     Communication with your provider:  - [facility] [employer] is highly recommended as the most efficient means to contact your provider. However for urgent concerns please call.   - For phone calls, please call our office Monday- Friday 8am to 4:30pm and your message will be relayed to your provider. Please allows 1-2 business days for a response.  -  After hours messages are left with an answering service and will be handled by the clinic the following business day.      Sincerely,   Gail Reyes MD  Division of Endocrinology  [facility]    [Time Spent: ___ minutes] : I have spent [unfilled] minutes of time on the encounter which excludes teaching and separately reported services.

## 2025-02-06 ENCOUNTER — SPECIALTY PHARMACY (OUTPATIENT)
Dept: PHARMACY | Facility: CLINIC | Age: 35
End: 2025-02-06

## 2025-02-06 NOTE — PROGRESS NOTES
[facility] Clinical Note  Initial Patient Education     Introduction  Sasha Longoria is a 34 y.o. female who is on the specialty pharmacy service for management of: Migraine Core.    Sasha Longoria is initiating the following therapy: Ajovy 225mg once monthly     Medication receipt date: 2/6/2025  Duration of therapy: Maintenance    The most recent encounter visit with the referring prescriber STEPHANIE Pozo on 1/20/2025 was reviewed.  Pharmacy will continue to collaborate in the care of this patient with the referring prescriber.    Clinical Background  An initial assessment was conducted prior to first fill of the medication to determine the appropriateness of therapy given the patient's diagnosis, medication list, comorbidities, allergies, medical history, patient's ability to self administer medication, and therapeutic goals based on possible outcomes of therapy. Refer to initial assessment task completed on 2/4/2025.    Labs/Procedures for clinical appropriateness that were reviewed include:   Neurology - Migraine - There are no routine laboratory monitoring parameters for this medication    Education/Discussion  Sasha was contacted on 2/6/2025 at 11:52 AM for a pharmacy visit with encounter number [id number] from:   [facility]  [address] [place] [id number]  Dept: [phone]  Dept Fax: [phone]  Sasha consented to a/an Telephone visit, which was performed.    Medication Start Date (planned or actual): Unknown  Education was conducted prior to start of therapy? Yes    Education discussed includes the following:  Patient Education  Counseled the Patient on the Following : Theraputic rationale and expected outcomes, Expected duration of therapy, Doses and administration, Adherence and missed doses, Possible side effects and management, Possible drug interactions, Contraindications and precautions, Pharmacy  "contact information, Safe handling, storage, and disposal, Lab monitoring and follow-up  Learner: Patient  Education Method: Explanation  Education Response: Verbalizes understanding  Additional details of the medication specific counseling are found within the linked patient education flowsheet.     The follow up timeline was discussed. Every person responds to and reacts to therapy differently. Patient should be assessed for efficacy and tolerability in approximately: 3 months    Provided education on goals and possible outcomes of therapy:  Adherence with therapy  Timely completion of appropriate labs  Timely and appropriate follow up with provider  Identify and address medication interactions with presciption medications, OTC medications and supplements  Optimize or maintain quality of life  Neurology- Migraine: 50% reduction in migraine days each month from baseline  Decreased use of \"prn\" agents to treat migraine headaches  Improvement in MIDAS score from baseline    The importance of adherence was discussed and they were advised to take the medication as prescribed by their provider.     Impression/Plan  Review and Assessment   Reviewed During This Encounter: Allergies, Medications, Problem list  Medications Assessed for Appropriate Use, Dose, Route, Frequency, and Duration: Yes  Medication Reconciliation Completed: No (Comment)  Drug Interactions Evaluated: Yes  Clinically Relevant Drug Interactions Identified: No  List Interactions and Management Plan: None    This patient has not been identified as high risk due to Lack of high risk qualifiers.  The following action was taken: N/A.    QOL/Patient Satisfaction  Rate your quality of life on scale of 1-10: 6  Rate your satisfaction with  Specialty Pharmacy on scale of 1-10: 4 (Due to processing issues with Vyepti which was previously prescribed)    The  Specialty Pharmacy Welcome packet may be viewed here:   Specialty Pharmacy Welcome Packet     Or by " scanning QR code:      Provided contact information ([phone]) for [facility] and reviewed dispensing process, refill timeline and patient management follow up. Advised to contact the pharmacy if there are any adverse effects and/or changes to medication list, including prescriptions, OTC medications, herbal products, or supplements. Confirmed understanding of education conducted during assessment. All questions and concerns were addressed and patient was encouraged to reach out for additional questions or concerns.      Penelope Keller, PharmD

## 2025-02-07 ENCOUNTER — APPOINTMENT (OUTPATIENT)
Dept: PHYSICAL THERAPY | Facility: CLINIC | Age: 35
End: 2025-02-07
Payer: COMMERCIAL

## 2025-02-07 DIAGNOSIS — R10.9 ABDOMINAL PAIN, UNSPECIFIED ABDOMINAL LOCATION: Primary | ICD-10-CM

## 2025-02-07 RX ORDER — DICYCLOMINE HYDROCHLORIDE 10 MG/1
10 CAPSULE ORAL 4 TIMES DAILY PRN
Qty: 60 CAPSULE | Refills: 2 | Status: SHIPPED | OUTPATIENT
Start: 2025-02-07 | End: 2025-03-09

## 2025-02-10 ENCOUNTER — HOSPITAL ENCOUNTER (OUTPATIENT)
Dept: RADIOLOGY | Facility: HOSPITAL | Age: 35
Discharge: HOME | End: 2025-02-10
Payer: COMMERCIAL

## 2025-02-10 VITALS
OXYGEN SATURATION: 97 % | RESPIRATION RATE: 18 BRPM | TEMPERATURE: 98.2 F | DIASTOLIC BLOOD PRESSURE: 82 MMHG | SYSTOLIC BLOOD PRESSURE: 142 MMHG | HEART RATE: 89 BPM

## 2025-02-10 DIAGNOSIS — Z45.2 ENCOUNTER FOR INSERTION OF VENOUS ACCESS PORT: ICD-10-CM

## 2025-02-10 PROCEDURE — 76937 US GUIDE VASCULAR ACCESS: CPT

## 2025-02-10 PROCEDURE — 2500000004 HC RX 250 GENERAL PHARMACY W/ HCPCS (ALT 636 FOR OP/ED): Performed by: RADIOLOGY

## 2025-02-10 PROCEDURE — C1788 PORT, INDWELLING, IMP: HCPCS

## 2025-02-10 PROCEDURE — 36561 INSERT TUNNELED CV CATH: CPT | Mod: RT

## 2025-02-10 PROCEDURE — 99153 MOD SED SAME PHYS/QHP EA: CPT

## 2025-02-10 PROCEDURE — 7100000010 HC PHASE TWO TIME - EACH INCREMENTAL 1 MINUTE

## 2025-02-10 PROCEDURE — 77001 FLUOROGUIDE FOR VEIN DEVICE: CPT

## 2025-02-10 PROCEDURE — 2780000003 HC OR 278 NO HCPCS

## 2025-02-10 PROCEDURE — 99153 MOD SED SAME PHYS/QHP EA: CPT | Performed by: RADIOLOGY

## 2025-02-10 PROCEDURE — 2500000005 HC RX 250 GENERAL PHARMACY W/O HCPCS: Performed by: RADIOLOGY

## 2025-02-10 PROCEDURE — 7100000009 HC PHASE TWO TIME - INITIAL BASE CHARGE

## 2025-02-10 PROCEDURE — 99152 MOD SED SAME PHYS/QHP 5/>YRS: CPT

## 2025-02-10 PROCEDURE — 99152 MOD SED SAME PHYS/QHP 5/>YRS: CPT | Performed by: RADIOLOGY

## 2025-02-10 RX ORDER — LIDOCAINE HYDROCHLORIDE 10 MG/ML
INJECTION, SOLUTION EPIDURAL; INFILTRATION; INTRACAUDAL; PERINEURAL
Status: COMPLETED | OUTPATIENT
Start: 2025-02-10 | End: 2025-02-10

## 2025-02-10 RX ORDER — FENTANYL CITRATE 50 UG/ML
INJECTION, SOLUTION INTRAMUSCULAR; INTRAVENOUS
Status: COMPLETED | OUTPATIENT
Start: 2025-02-10 | End: 2025-02-10

## 2025-02-10 RX ORDER — MIDAZOLAM HYDROCHLORIDE 1 MG/ML
INJECTION INTRAMUSCULAR; INTRAVENOUS
Status: COMPLETED | OUTPATIENT
Start: 2025-02-10 | End: 2025-02-10

## 2025-02-10 RX ORDER — CEFAZOLIN SODIUM 2 G/100ML
2 INJECTION, SOLUTION INTRAVENOUS ONCE
Status: COMPLETED | OUTPATIENT
Start: 2025-02-10 | End: 2025-02-10

## 2025-02-10 RX ADMIN — LIDOCAINE HYDROCHLORIDE 5 ML: 10 INJECTION, SOLUTION EPIDURAL; INFILTRATION; INTRACAUDAL; PERINEURAL at 09:43

## 2025-02-10 RX ADMIN — FENTANYL CITRATE 50 MCG: 50 INJECTION, SOLUTION INTRAMUSCULAR; INTRAVENOUS at 09:40

## 2025-02-10 RX ADMIN — Medication 3 L/MIN: at 09:28

## 2025-02-10 RX ADMIN — CEFAZOLIN SODIUM 2 G: 2 INJECTION, SOLUTION INTRAVENOUS at 09:27

## 2025-02-10 RX ADMIN — MIDAZOLAM HYDROCHLORIDE 1 MG: 1 INJECTION, SOLUTION INTRAMUSCULAR; INTRAVENOUS at 09:48

## 2025-02-10 RX ADMIN — FENTANYL CITRATE 50 MCG: 50 INJECTION, SOLUTION INTRAMUSCULAR; INTRAVENOUS at 09:48

## 2025-02-10 RX ADMIN — MIDAZOLAM HYDROCHLORIDE 1 MG: 1 INJECTION, SOLUTION INTRAMUSCULAR; INTRAVENOUS at 09:40

## 2025-02-10 ASSESSMENT — PAIN SCALES - GENERAL
PAINLEVEL_OUTOF10: 0 - NO PAIN

## 2025-02-10 ASSESSMENT — PAIN - FUNCTIONAL ASSESSMENT
PAIN_FUNCTIONAL_ASSESSMENT: 0-10

## 2025-02-10 NOTE — Clinical Note
Mediport placed at this time right chest tunneled thru right jugular vein, measuring 17cm , patient tolerated well, flushed with heparin. Dermabond, Steri strips drsg aplied.
Prepped: chest. Prepped with: chlorhexidine. The patient was draped.
never

## 2025-02-10 NOTE — POST-PROCEDURE NOTE
Interventional Radiology Brief Postprocedure Note    Attending: Sergio Kennedy MD    Assistant:   Staff Role   Graciela Marcelo MT Radiology Technologist   Sergio Kennedy MD Radiologist   Vivian Vann, RN Radiology Nurse   Filiberto Hatfield MT Radiology Technologist       Diagnosis:   1. Encounter for insertion of venous access port  IR CVC port placement    IR CVC port placement          Description of procedure: right chest port placement    Timeout:  Yes    Procedure Area: Procedure Area     Anesthesia:   Conscious Sedation    Complications: None    Estimated Blood Loss: minimal    Medications (Filter: Administrations occurring from 0913 to 1008 on 02/10/25) As of 02/10/25 1008      ceFAZolin (Ancef) 2 g in dextrose (iso)  mL (g) Total dose:  2 g*   *From user-documented volume     Date/Time Rate/Dose/Volume Action       02/10/25  0927 2 g (over 30 min) New Bag      1008 100 mL Stopped               oxygen (O2) therapy (L/min) Total volume:  Not documented*   *Total volume has not been documented. View each administration to see the amount administered.     Date/Time Rate/Dose/Volume Action       02/10/25  0928 3 L/min - 180,000 mL/hr Start      1008  Stopped               fentaNYL PF (Sublimaze) injection (mcg) Total dose:  100 mcg      Date/Time Rate/Dose/Volume Action       02/10/25  0940 50 mcg Given      0948 50 mcg Given               midazolam (Versed) injection (mg) Total dose:  2 mg      Date/Time Rate/Dose/Volume Action       02/10/25  0940 1 mg Given      0948 1 mg Given               lidocaine PF (Xylocaine) 10 mg/mL (1 %) injection (mL) Total volume:  5 mL      Date/Time Rate/Dose/Volume Action       02/10/25  0943 5 mL Given                   No specimens collected      See detailed result report with images in PACS.    The patient tolerated the procedure well without incident or complication and is in stable condition.

## 2025-02-10 NOTE — PRE-PROCEDURE NOTE
Interventional Radiology Preprocedure Note    Indication for procedure: The encounter diagnosis was Encounter for insertion of venous access port.    Relevant review of systems: NA    Relevant Labs:   Lab Results   Component Value Date    CREATININE 0.82 11/07/2024    EGFR >90 11/07/2024    PREGTESTUR Negative 01/29/2025       Planned Sedation/Anesthesia: Moderate    Airway assessment: abnormal    Directed physical examination:    RRR, lungs CTA-B    Mallampati: IV (only hard palate visible)    ASA Score: ASA 2 - Patient with mild systemic disease with no functional limitations    Benefits, risks and alternatives of procedure and planned sedation have been discussed with the patient and/or their representative. All questions answered and they agree to proceed.    >6

## 2025-02-11 DIAGNOSIS — M79.7 FIBROMYALGIA: Primary | ICD-10-CM

## 2025-02-11 RX ORDER — KETOROLAC TROMETHAMINE 30 MG/ML
30 INJECTION, SOLUTION INTRAMUSCULAR; INTRAVENOUS ONCE
Status: CANCELLED | OUTPATIENT
Start: 2025-02-14 | End: 2025-02-14

## 2025-02-11 RX ORDER — KETAMINE HCL IN NACL, ISO-OSM 100MG/10ML
SYRINGE (ML) INJECTION ONCE
Status: CANCELLED | OUTPATIENT
Start: 2025-02-14

## 2025-02-11 ASSESSMENT — PAIN SCALES - GENERAL: PAINLEVEL_OUTOF10: 2

## 2025-02-12 ENCOUNTER — APPOINTMENT (OUTPATIENT)
Dept: PHYSICAL THERAPY | Facility: CLINIC | Age: 35
End: 2025-02-12
Payer: COMMERCIAL

## 2025-02-14 ENCOUNTER — INFUSION (OUTPATIENT)
Dept: INFUSION THERAPY | Facility: CLINIC | Age: 35
End: 2025-02-14
Payer: COMMERCIAL

## 2025-02-14 VITALS
OXYGEN SATURATION: 96 % | RESPIRATION RATE: 16 BRPM | SYSTOLIC BLOOD PRESSURE: 135 MMHG | HEART RATE: 91 BPM | TEMPERATURE: 97.7 F | DIASTOLIC BLOOD PRESSURE: 79 MMHG

## 2025-02-14 DIAGNOSIS — M79.7 FIBROMYALGIA: Primary | ICD-10-CM

## 2025-02-14 LAB
PREGNANCY TEST URINE, POC: NEGATIVE
PREGNANCY TEST URINE, POC: NEGATIVE

## 2025-02-14 PROCEDURE — 81025 URINE PREGNANCY TEST: CPT

## 2025-02-14 PROCEDURE — 2500000004 HC RX 250 GENERAL PHARMACY W/ HCPCS (ALT 636 FOR OP/ED): Performed by: ANESTHESIOLOGY

## 2025-02-14 PROCEDURE — 2500000004 HC RX 250 GENERAL PHARMACY W/ HCPCS (ALT 636 FOR OP/ED): Performed by: NURSE PRACTITIONER

## 2025-02-14 RX ORDER — ONDANSETRON HYDROCHLORIDE 2 MG/ML
4 INJECTION, SOLUTION INTRAVENOUS ONCE
Status: COMPLETED | OUTPATIENT
Start: 2025-02-14 | End: 2025-02-14

## 2025-02-14 RX ORDER — KETOROLAC TROMETHAMINE 30 MG/ML
30 INJECTION, SOLUTION INTRAMUSCULAR; INTRAVENOUS ONCE
Status: COMPLETED | OUTPATIENT
Start: 2025-02-14 | End: 2025-02-14

## 2025-02-14 RX ORDER — HEPARIN 100 UNIT/ML
500 SYRINGE INTRAVENOUS AS NEEDED
Status: ACTIVE | OUTPATIENT
Start: 2025-02-14

## 2025-02-14 RX ORDER — ONDANSETRON HYDROCHLORIDE 2 MG/ML
4 INJECTION, SOLUTION INTRAVENOUS ONCE
OUTPATIENT
Start: 2025-02-28 | End: 2025-02-28

## 2025-02-14 RX ORDER — DIPHENHYDRAMINE HYDROCHLORIDE 50 MG/ML
25 INJECTION INTRAMUSCULAR; INTRAVENOUS ONCE
OUTPATIENT
Start: 2025-02-28 | End: 2025-02-28

## 2025-02-14 RX ORDER — NITROGLYCERIN 0.4 MG/1
0.4 TABLET SUBLINGUAL ONCE
OUTPATIENT
Start: 2025-02-28 | End: 2025-02-28

## 2025-02-14 RX ORDER — METOPROLOL TARTRATE 25 MG/1
25 TABLET, FILM COATED ORAL ONCE
OUTPATIENT
Start: 2025-02-28 | End: 2025-02-28

## 2025-02-14 RX ORDER — KETAMINE HCL IN NACL, ISO-OSM 100MG/10ML
SYRINGE (ML) INJECTION ONCE
Status: COMPLETED | OUTPATIENT
Start: 2025-02-14 | End: 2025-02-14

## 2025-02-14 RX ORDER — EPINEPHRINE 0.3 MG/.3ML
0.3 INJECTION SUBCUTANEOUS EVERY 5 MIN PRN
OUTPATIENT
Start: 2025-02-28

## 2025-02-14 RX ORDER — KETAMINE HCL IN NACL, ISO-OSM 100MG/10ML
SYRINGE (ML) INJECTION ONCE
Status: CANCELLED | OUTPATIENT
Start: 2025-02-28

## 2025-02-14 RX ORDER — KETOROLAC TROMETHAMINE 30 MG/ML
30 INJECTION, SOLUTION INTRAMUSCULAR; INTRAVENOUS ONCE
Status: CANCELLED | OUTPATIENT
Start: 2025-02-28 | End: 2025-02-28

## 2025-02-14 RX ORDER — ALBUTEROL SULFATE 0.83 MG/ML
3 SOLUTION RESPIRATORY (INHALATION) AS NEEDED
OUTPATIENT
Start: 2025-02-28

## 2025-02-14 RX ORDER — METOCLOPRAMIDE HYDROCHLORIDE 5 MG/ML
10 INJECTION INTRAMUSCULAR; INTRAVENOUS ONCE
OUTPATIENT
Start: 2025-02-28 | End: 2025-02-28

## 2025-02-14 RX ORDER — DIPHENHYDRAMINE HYDROCHLORIDE 50 MG/ML
50 INJECTION INTRAMUSCULAR; INTRAVENOUS AS NEEDED
OUTPATIENT
Start: 2025-02-28

## 2025-02-14 RX ORDER — FAMOTIDINE 10 MG/ML
20 INJECTION INTRAVENOUS ONCE AS NEEDED
OUTPATIENT
Start: 2025-02-28

## 2025-02-14 RX ORDER — HEPARIN SODIUM,PORCINE 10 UNIT/ML
10 VIAL (ML) INTRAVENOUS ONCE
Status: ACTIVE | OUTPATIENT
Start: 2025-02-14

## 2025-02-14 RX ADMIN — KETOROLAC TROMETHAMINE 30 MG: 30 INJECTION, SOLUTION INTRAMUSCULAR at 15:15

## 2025-02-14 RX ADMIN — PROPOFOL 100 MG: 10 INJECTION, EMULSION INTRAVENOUS at 15:17

## 2025-02-14 RX ADMIN — HEPARIN 500 UNITS: 100 SYRINGE at 16:01

## 2025-02-14 RX ADMIN — ONDANSETRON 4 MG: 2 INJECTION INTRAMUSCULAR; INTRAVENOUS at 15:17

## 2025-02-14 RX ADMIN — Medication: at 15:17

## 2025-02-14 ASSESSMENT — ENCOUNTER SYMPTOMS
DEPRESSION: 0
OCCASIONAL FEELINGS OF UNSTEADINESS: 0
LOSS OF SENSATION IN FEET: 1

## 2025-02-14 ASSESSMENT — PAIN - FUNCTIONAL ASSESSMENT
PAIN_FUNCTIONAL_ASSESSMENT: 0-10
PAIN_FUNCTIONAL_ASSESSMENT: 0-10

## 2025-02-14 ASSESSMENT — PAIN DESCRIPTION - DESCRIPTORS: DESCRIPTORS: ACHING;SORE

## 2025-02-14 ASSESSMENT — PAIN SCALES - GENERAL
PAINLEVEL_OUTOF10: 4
PAINLEVEL_OUTOF10: 7

## 2025-02-14 NOTE — PROGRESS NOTES
S: Patient here for 28 th opioid sparing pain infusion. Patient reports 70-75% reduction in pain after last infusion that lasted 1.5 weeks.    Purpose of pain infusion meds explained along with potential side effects.  Patient verbalized understanding.    B: Pain Issues. Is Patient breast feeding: NO    A: Patient currently has pain described on flow sheet documentation. Designated  is Adrian Connor [phone]. Patient last ate solid food >8 hours ago, and had liquid >1 hours ago.    R: Plan; Obtain IV access, do patient risk assessment, and start opioid sparing infusion as ordered. Monitoring for S/S of adverse reactions.    1515: Patient C/O nausea, will give Zofran as ordered PRN, See Emar.    1535: Patient is restful as of this time, tolerating pain infusion well, nausea has resolved. Continuing to monitor.    1600: Post infusion teaching provided. Patient verbalized understanding. VSS, Patient states pain is less, rates 4/10, same area. Will assist patient to waiting car via wheelchair.

## 2025-02-16 ENCOUNTER — OFFICE VISIT (OUTPATIENT)
Dept: URGENT CARE | Age: 35
End: 2025-02-16
Payer: COMMERCIAL

## 2025-02-16 VITALS
DIASTOLIC BLOOD PRESSURE: 86 MMHG | RESPIRATION RATE: 20 BRPM | BODY MASS INDEX: 47.77 KG/M2 | SYSTOLIC BLOOD PRESSURE: 121 MMHG | OXYGEN SATURATION: 95 % | HEIGHT: 61 IN | HEART RATE: 93 BPM | TEMPERATURE: 98.4 F | WEIGHT: 253 LBS

## 2025-02-16 DIAGNOSIS — N30.00 ACUTE CYSTITIS WITHOUT HEMATURIA: Primary | ICD-10-CM

## 2025-02-16 DIAGNOSIS — R30.0 DYSURIA: ICD-10-CM

## 2025-02-16 LAB
POC APPEARANCE, URINE: ABNORMAL
POC BILIRUBIN, URINE: ABNORMAL
POC BLOOD, URINE: NEGATIVE
POC COLOR, URINE: ABNORMAL
POC GLUCOSE, URINE: NEGATIVE MG/DL
POC KETONES, URINE: NEGATIVE MG/DL
POC LEUKOCYTES, URINE: ABNORMAL
POC NITRITE,URINE: POSITIVE
POC PH, URINE: 6.5 PH
POC PROTEIN, URINE: NEGATIVE MG/DL
POC SPECIFIC GRAVITY, URINE: 1.02
POC UROBILINOGEN, URINE: 1 EU/DL
PREGNANCY TEST URINE, POC: NEGATIVE

## 2025-02-16 RX ORDER — NITROFURANTOIN 25; 75 MG/1; MG/1
100 CAPSULE ORAL 2 TIMES DAILY
Qty: 14 CAPSULE | Refills: 0 | Status: SHIPPED | OUTPATIENT
Start: 2025-02-16 | End: 2025-02-23

## 2025-02-16 ASSESSMENT — ENCOUNTER SYMPTOMS
CHILLS: 0
FATIGUE: 0
DYSURIA: 1
FREQUENCY: 1
FEVER: 0
DIFFICULTY URINATING: 1
MYALGIAS: 0

## 2025-02-16 ASSESSMENT — PAIN SCALES - GENERAL: PAINLEVEL_OUTOF10: 4

## 2025-02-16 NOTE — PROGRESS NOTES
Subjective   Patient ID: Sasha Longoria is a 34 y.o. female. They present today with a chief complaint of Female Dysuria.    History of Present Illness  Patient presents with 1 day history of burning with urination, increased frequency, increased urgency.        Female Dysuria  Associated symptoms: no fatigue, no fever and no myalgias        Past Medical History  Allergies as of 02/16/2025    (No Known Allergies)       (Not in a hospital admission)       Past Medical History:   Diagnosis Date    Abnormal Pap smear of cervix     Anxiety     Arthritis ~ 2013    Chronic pain disorder     Depression     Eczema ~ 2005    Fibromyalgia, primary     GERD (gastroesophageal reflux disease) ~2018    Headache     Headache, tension-type     HPV (human papilloma virus) infection     Joint pain     Low back pain     Memory loss     Migraine     Neck pain     Neuromuscular disorder (Multi) 2020    Numbness     Obstructive sleep apnea     Polycystic ovary syndrome     Scoliosis ~2005    Visual impairment ~2000       Past Surgical History:   Procedure Laterality Date    OTHER SURGICAL HISTORY  09/25/2019    Tonsillectomy with adenoidectomy    OTHER SURGICAL HISTORY  04/26/2022    No history of surgery    TONSILLECTOMY          reports that she has never smoked. She has never used smokeless tobacco. She reports current alcohol use of about 1.0 standard drink of alcohol per week. She reports that she does not currently use drugs after having used the following drugs: Marijuana. Frequency: 4.00 times per week.    Review of Systems  Review of Systems   Constitutional:  Negative for chills, fatigue and fever.   Genitourinary:  Positive for difficulty urinating, dysuria, frequency and urgency.   Musculoskeletal:  Negative for myalgias.                                  Objective    Vitals:    02/16/25 1317   BP: 121/86   Pulse: 93   Resp: 20   Temp: 36.9 °C (98.4 °F)   TempSrc: Oral   SpO2: 95%   Weight: 115 kg (253 lb)   Height:  "1.549 m (5' 1\")     No LMP recorded (lmp unknown). (Menstrual status: IUD).    Physical Exam  Vitals reviewed.   Constitutional:       Appearance: Normal appearance.   Cardiovascular:      Rate and Rhythm: Normal rate and regular rhythm.   Pulmonary:      Effort: Pulmonary effort is normal.      Breath sounds: Normal breath sounds.   Abdominal:      Tenderness: There is no right CVA tenderness or left CVA tenderness.   Skin:     General: Skin is warm and dry.   Neurological:      Mental Status: She is alert.         Procedures    Point of Care Test & Imaging Results from this visit  Results for orders placed or performed in visit on 02/16/25   POCT UA Automated manually resulted   Result Value Ref Range    POC Color, Urine Orange (A) Straw, Yellow, Light-Yellow    POC Appearance, Urine Hazy (A) Clear    POC Glucose, Urine NEGATIVE NEGATIVE mg/dl    POC Bilirubin, Urine SMALL (1+) (A) NEGATIVE    POC Ketones, Urine NEGATIVE NEGATIVE mg/dl    POC Specific Gravity, Urine 1.020 1.005 - 1.035    POC Blood, Urine NEGATIVE NEGATIVE    POC PH, Urine 6.5 No Reference Range Established PH    POC Protein, Urine NEGATIVE NEGATIVE mg/dl    POC Urobilinogen, Urine 1.0 0.2, 1.0 EU/DL    Poc Nitrite, Urine POSITIVE (A) NEGATIVE    POC Leukocytes, Urine MODERATE (2+) (A) NEGATIVE   POCT pregnancy, urine manually resulted   Result Value Ref Range    Preg Test, Ur Negative Negative      No results found.    Diagnostic study results (if any) were reviewed by STEPHANEI Morris.    Assessment/Plan   Allergies, medications, history, and pertinent labs/EKGs/Imaging reviewed by STEPHANIE Morris. Prescription given for Macrobid. Urine sent for culture. Advised patient to follow-up for any increase in symptoms, fever, chills, CVA tenderness. Discussed measures to prevent UTI reoccurrence.       Medical Decision Making  At time of discharge patient was clinically well-appearing and HDS for outpatient management. The patient and/or " family was educated regarding diagnosis, supportive care, OTC and Rx medications. The patient and/or family was given the opportunity to ask questions prior to discharge.  They verbalized understanding of my discussion of the plans for treatment, expected course, indications to return to  or seek further evaluation in ED, and the need for timely follow up as directed.   They were provided with a work/school excuse if requested.      Orders and Diagnoses  Diagnoses and all orders for this visit:  Acute cystitis without hematuria  -     Urine Culture  -     nitrofurantoin, macrocrystal-monohydrate, (Macrobid) 100 mg capsule; Take 1 capsule (100 mg) by mouth 2 times a day for 7 days.  Dysuria  -     POCT UA Automated manually resulted  -     POCT pregnancy, urine manually resulted      Medical Admin Record      Patient disposition: Home    Electronically signed by STEPHANIE Morris  1:34 PM

## 2025-02-17 ASSESSMENT — ENCOUNTER SYMPTOMS
SHORTNESS OF BREATH: 0
DIFFICULTY URINATING: 0
AGITATION: 0
FATIGUE: 1
COLOR CHANGE: 0
DIZZINESS: 0
FEVER: 0
NAUSEA: 0
DIARRHEA: 0
VOMITING: 0
DYSURIA: 0

## 2025-02-17 NOTE — PROGRESS NOTES
Assessment/Plan   Today's presentation is consistent with fibromyalgia/myofascial pain syndrome alongside postural strain and somatic dysfunction contributing to her pain syndrome, also has been diagnosed with inflammatory arthritis (not rheumatoid), PCOS, and has had elevated ESR/CRP and been under rheumatologic care.  Complicating factors include chronicity of symptoms as well as body habitus.  We will implement a trial of care to include various manipulative techniques which will range from instrument assisted to diversified.  We will utilize soft tissue techniques such as active release technique to the cervical spine musculature.  We will closely monitor their response to care to determine if any changes need to occur, if advanced imaging is needed, or if referral to other providers is appropriate. She will also be receiving acupuncture and integrative medicine consultation which is appropriate when considering her overall presentation     Full spine EOS radiographs 2024 - 12 degree dextroconvex scoliosis from T11 to L3. Hyperkyphosis in TS and hyperlordosis in LS    Brain MRI 2024 - IMPRESSION:  1. No MR evidence of acute intracranial infarct, hemorrhage, mass, or  mass effect.  2. Nonspecific 6 mm white matter FLAIR signal hyperintensity adjacent  to the trigone of the right lateral ventricle, which may be  indicative of minimal infectious inflammatory change, migraine  disorder, demyelinating disease, or vasculitis for instance in the  appropriate clinical context. Consider follow-up exam if clinically  indicated.    LS radiographs 2024 - IMPRESSION:  No acute pathologic findings are identified.    CS radiographs 2024 - IMPRESSION:  No pathologic findings are identified.    TS radiographs 2024 - IMPRESSION:  No acute pathologic findings are identified.  Thoracic lumbar dextroscoliosis.  Lower thoracic mild spondylosis.    Visits this year: 3 (under new insurance)    Subjective   Patient reports frequent  severe occipital and bifrontal headache at the moment which came on this morning without specific injury.  Also endorses moderate neck pain and tightness locally and moderate stiffness in the mid to lower back.  She was having some stomach and bladder discomfort however those have subsided.  She is working with her other providers to manage pain and come off different medications were tapered down and is also waiting to see if new or migraine medication is helping with her headaches. Had port put in to help with infusions given challenges in past.    HPI - 10/05/23 (CR): the patient presents today per the referral of Dr. Suárez and pain management for evaluation and and management of chronic full spine complaints. She has in the past been diagnosed with fibromyalgia. She has dealt with pain for several years and has received chiropractic care in the past. She did have mixed results with chiropractic care in the past, reporting a variation of instrument assisted manipulation and manual manipulation. But she wished to attend chiropractic care again through a hospital-based provider. She is under the care of pain management and will be starting infusions next week. She is also on the wait list for infusions at [facility] if needed. Overall, her pain levels remain moderate to severe. Her pain is constant. Symptoms in the lower back and hips seem to be the area of most intensity. But she does continue to experience pain throughout the spine. She experiences paresthesias in the upper and lower extremities bilaterally. She has difficulty finding any comfortable positions     Review of Systems   Constitutional:  Positive for fatigue. Negative for fever.   Eyes:  Negative for visual disturbance.   Respiratory:  Negative for shortness of breath.    Cardiovascular:  Negative for chest pain.   Gastrointestinal:  Negative for diarrhea, nausea and vomiting.   Genitourinary:  Negative for difficulty urinating and dysuria.    Skin:  Negative for color change.   Neurological:  Negative for dizziness.   Psychiatric/Behavioral:  Negative for agitation.    All other systems reviewed and are negative.    Objective   Examination findings (e.g., palpation & ROM): Decreased C/S and L/S ROM with pain, hypertonic and tender cervical and lumbar erectors, BL upper trapezius  SLR BL 70    Segmental joint dysfunction was identified in the following areas using motion palpation and/or pain provocation assessment:  Cervical: 2  Thoracic: 5-10  Lumbopelvic: 1-3, BL SIJ      Physical Exam  Neurological:      General: No focal deficit present.      Mental Status: She is alert.      Motor: Motor function is intact.      Coordination: Coordination is intact.      Gait: Gait is intact.      Deep Tendon Reflexes: Reflexes are normal and symmetric.      Reflex Scores:       Tricep reflexes are 2+ on the right side and 2+ on the left side.       Bicep reflexes are 2+ on the right side and 2+ on the left side.       Brachioradialis reflexes are 2+ on the right side and 2+ on the left side.       Patellar reflexes are 2+ on the right side and 2+ on the left side.       Achilles reflexes are 2+ on the right side and 2+ on the left side.     Comments: Trace Jaylin's BL  9/3/24       Plan   Today's treatment:  SMT to regions of segmental dysfunction identified on exam, using age-appropriate force, and manual diversified technique.   Mobilization on CS  STM to patient tolerance to hypertonic paraspinal muscles, upper trapezius BL  Manual dynamic stretching of the bl gluteal mm & hamstrings  10:40 to 10:55 AM  Patient noted improved mobility and reduced pain post-treatment    Treatment Plan:   The patient and I discussed the risks and benefits of chiropractic care. Based on the patient's subjective complaints along with the examination findings, it is advised that a course of chiropractic treatment be initiated. The patient provided consent for care. The patient  tolerated today's treatment with little or no additional discomfort and was instructed to contact the office for questions or concerns. Will see patient once per week then every 2 weeks when symptoms become mild/manageable, further spaced apart contingent upon improvement.     This chart note was generated using dictation software, and as such, there may be typographical errors present. Abbreviations: Cervical spine (CS), cervical-thoracic (CT), Dry needling (DN), Flexion adduction internal rotation (FAIR), high velocity, low amplitude (HVLA), Lumbar spine (LS), Soft tissue manipulation (STM), spinal manipulative therapy (SMT), Straight leg raise (SLR), Thoracic spine (TS).

## 2025-02-18 ENCOUNTER — APPOINTMENT (OUTPATIENT)
Dept: PHYSICAL THERAPY | Facility: CLINIC | Age: 35
End: 2025-02-18
Payer: COMMERCIAL

## 2025-02-18 ENCOUNTER — APPOINTMENT (OUTPATIENT)
Dept: INTEGRATIVE MEDICINE | Facility: CLINIC | Age: 35
End: 2025-02-18
Payer: COMMERCIAL

## 2025-02-18 DIAGNOSIS — M99.04 SACROILIAC JOINT SOMATIC DYSFUNCTION: ICD-10-CM

## 2025-02-18 DIAGNOSIS — G89.29 CHRONIC LOW BACK PAIN WITH SCIATICA, SCIATICA LATERALITY UNSPECIFIED, UNSPECIFIED BACK PAIN LATERALITY: ICD-10-CM

## 2025-02-18 DIAGNOSIS — M79.10 MYALGIA: ICD-10-CM

## 2025-02-18 DIAGNOSIS — R51.9 CHRONIC DAILY HEADACHE: ICD-10-CM

## 2025-02-18 DIAGNOSIS — M99.02 SOMATIC DYSFUNCTION OF THORACIC REGION: ICD-10-CM

## 2025-02-18 DIAGNOSIS — M99.03 SOMATIC DYSFUNCTION OF LUMBAR REGION: Primary | ICD-10-CM

## 2025-02-18 DIAGNOSIS — M99.01 SOMATIC DYSFUNCTION OF CERVICAL REGION: ICD-10-CM

## 2025-02-18 DIAGNOSIS — M54.40 CHRONIC LOW BACK PAIN WITH SCIATICA, SCIATICA LATERALITY UNSPECIFIED, UNSPECIFIED BACK PAIN LATERALITY: ICD-10-CM

## 2025-02-18 DIAGNOSIS — M54.2 NECK PAIN: ICD-10-CM

## 2025-02-18 PROCEDURE — 97140 MANUAL THERAPY 1/> REGIONS: CPT | Performed by: CHIROPRACTOR

## 2025-02-18 PROCEDURE — 98941 CHIROPRACT MANJ 3-4 REGIONS: CPT | Performed by: CHIROPRACTOR

## 2025-02-19 ENCOUNTER — TELEPHONE (OUTPATIENT)
Dept: URGENT CARE | Age: 35
End: 2025-02-19

## 2025-02-19 ENCOUNTER — APPOINTMENT (OUTPATIENT)
Dept: INTEGRATIVE MEDICINE | Facility: CLINIC | Age: 35
End: 2025-02-19
Payer: COMMERCIAL

## 2025-02-19 VITALS
WEIGHT: 256 LBS | HEIGHT: 61 IN | DIASTOLIC BLOOD PRESSURE: 89 MMHG | SYSTOLIC BLOOD PRESSURE: 131 MMHG | HEART RATE: 82 BPM | OXYGEN SATURATION: 99 % | BODY MASS INDEX: 48.33 KG/M2

## 2025-02-19 DIAGNOSIS — E53.8 VITAMIN B12 DEFICIENCY: ICD-10-CM

## 2025-02-19 DIAGNOSIS — R53.82 CHRONIC FATIGUE: Primary | ICD-10-CM

## 2025-02-19 DIAGNOSIS — M79.7 FIBROMYALGIA: ICD-10-CM

## 2025-02-19 DIAGNOSIS — E55.9 VITAMIN D DEFICIENCY: ICD-10-CM

## 2025-02-19 LAB — BACTERIA UR CULT: NORMAL

## 2025-02-19 PROCEDURE — 99417 PROLNG OP E/M EACH 15 MIN: CPT | Performed by: INTERNAL MEDICINE

## 2025-02-19 PROCEDURE — 99215 OFFICE O/P EST HI 40 MIN: CPT | Performed by: INTERNAL MEDICINE

## 2025-02-19 SDOH — SOCIAL STABILITY: SOCIAL NETWORK: I HAVE TROUBLE DOING ALL OF MY USUAL WORK (INCLUDE WORK AT HOME): ALWAYS

## 2025-02-19 SDOH — SOCIAL STABILITY: SOCIAL NETWORK: I HAVE TROUBLE DOING ALL OF THE FAMILY ACTIVITIES THAT I WANT TO DO: USUALLY

## 2025-02-19 SDOH — SOCIAL STABILITY: SOCIAL NETWORK: PROMIS ABILITY TO PARTICIPATE IN SOCIAL ROLES & ACTIVITIES T-SCORE: 37

## 2025-02-19 SDOH — SOCIAL STABILITY: SOCIAL NETWORK

## 2025-02-19 SDOH — SOCIAL STABILITY: SOCIAL NETWORK: I HAVE TROUBLE DOING ALL OF MY REGULAR LEISURE ACTIVITIES WITH OTHERS: SOMETIMES

## 2025-02-19 SDOH — SOCIAL STABILITY: SOCIAL NETWORK: I HAVE TROUBLE DOING ALL OF THE ACTIVITIES WITH FRIENDS THAT I WANT TO DO: ALWAYS

## 2025-02-19 ASSESSMENT — ENCOUNTER SYMPTOMS
BLOOD IN STOOL: 0
ABDOMINAL PAIN: 1
CONSTIPATION: 1
ABDOMINAL DISTENTION: 1

## 2025-02-19 ASSESSMENT — PROMIS GLOBAL HEALTH SCALE
RATE_AVERAGE_FATIGUE: VERY SEVERE
RATE_MENTAL_HEALTH: FAIR
RATE_SOCIAL_SATISFACTION: FAIR
RATE_PHYSICAL_HEALTH: FAIR
RATE_AVERAGE_PAIN: 5
EMOTIONAL_PROBLEMS: OFTEN
RATE_GENERAL_HEALTH: FAIR
CARRYOUT_PHYSICAL_ACTIVITIES: MODERATELY
RATE_QUALITY_OF_LIFE: FAIR
CARRYOUT_SOCIAL_ACTIVITIES: FAIR

## 2025-02-19 NOTE — PATIENT INSTRUCTIONS
Get your labs done  Try out cpap new mask. Would like for you to wear cpap nightly.   Strive to go to the therapy pool twice a week.   Pablo's ear pillows to help block out the noise of the noise.   Follow up 2 months.

## 2025-02-19 NOTE — PROGRESS NOTES
Integrative Medicine Visit:     Subjective   Patient ID: Sasha Longoria is a 34 y.o. female who presents for FOLLOW UP CONSULT  and Fibromyalgia       HPI  Was seeing ELI Mullins in the past. She was working on eating better diet and trying to lose weight.   Her primary complaint and fatigue and also pain. Fatigue is worst symptom for her.   She has worked on different medications to help her sleep. She has sleep apnea. She is not using cpap. Is still working with sleep medicine about this. Has an appointment with behavioral sleep medicine- has vivid dreams and nightmares. She has tried magnesium at bedtime. Has variable issues with sleep- trouble falling asleep and some waking in the night. Hard to get out of bed. Tried cpap nightly for six months and did not notice much improvement except her daytime sleepiness improved.      Sees a gi for chronic bloating and constipation. She feels she does not have constipation but was told by GI that she does. She went to GI specialist in the beginning because was on plaquenil and it was causing pain. Had to stop plaquenil.  Had colonoscopy and upper endoscopy. (Celiac negative, no inflammatory bowel disease). Currently has loose stools 3-4 per day.     Many issues with her cycle. Has a mirena but having irregular spotting and bleeding. Was placed on norethindrone to help regulate her period.   On omeprazole for reflux. Was given protocol to get off omeprazole to take every other day.   On sulfasalazine for pain but trying to get off this.   Working on weight loss. Taking zep bound for this. Since zep bound she has lost 30 lbs. Stopped binge eating. At her highest about 16 months ago she was 345 lbs.   Sees chiropractic for pain management. Was doing acupuncture in the past but current insurance does not cover it.   Dental work is upto date. Sees dentist twice yearly.     CONCERNS:  really wants help with the fatigue. Also has chronic pain.     PMH:  arthritis- was on  plaquenil but could not handle belly pain from it.   PTSD- family related trauma; continues to have nightmares.     FamMH:    Family History   Problem Relation Name Age of Onset    Breast cancer Paternal Grandmother      Arthritis Mother Haylee     Depression Mother Haylee     Diabetes Mother Haylee     Heart disease Mother Haylee     Hypertension Mother Haylee     Learning disabilities Mother Haylee     Intellectual Disability Mother Haylee     Miscarriages / Stillbirths Mother Haylee      labor Mother Haylee     Depression Father Lowell     Diabetes Father Lowell     Drug abuse Father Lwoell     Hypertension Father Lowell     Mental illness Father Lowell     Alcohol abuse Maternal Grandfather Tera     Depression Maternal Grandfather Tera     Drug abuse Maternal Grandfather Tera     Heart disease Maternal Grandfather Tera     Stroke Maternal Grandfather Tera     Cancer Maternal Grandmother Carri     Depression Maternal Grandmother Carri     Diabetes Maternal Grandmother Carri     Depression Paternal Grandfather Whit     Heart disease Paternal Grandfather Whit     Hypertension Paternal Grandfather Whit     Migraines Paternal Grandfather Whit     Cancer Sister Amina     Depression Sister Amina     Cancer Sister Cayla     Depression Sister Cayla     Drug abuse Sister Cayla     Hypertension Sister Cayla     Miscarriages / Stillbirths Sister Cayla     Depression Sister Kenzie     Depression Sister Maria L     Depression Brother Chris     Drug abuse Mother's Brother Tera     Learning disabilities Brother Crow     Intellectual Disability Brother Crow         SOC:  lives with her two cats. 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     NUTRITION: did not discuss    Smoking:  non-smoker    Alcohol use:   less than one drink per month.     Exercise:   used to exercise daily but now she tries to get steps in. Has used a therapy pool and that was helpful but is not going regularly now.  "    SLEEP: seeing sleep medicine.  Typically goes to bed around 9-10 pm and gets up around 7 and then falls right back to sleep and usually gets up around 8 or 9 am.     STRESS MANAGEMENT: likes to cook and creative projects. Likes to read. Spend time with her pets.   SUPPORT: her partner Adrian of over a year. Has a therapist- has tried EMDR. Sees them once per week.     Review of Systems   Gastrointestinal:  Positive for abdominal distention, abdominal pain and constipation. Negative for blood in stool.            Pain:    Objective   /89 (BP Location: Left arm, Patient Position: Sitting, BP Cuff Size: Adult)   Pulse 82   Ht 1.549 m (5' 1\")   Wt 116 kg (256 lb)   LMP  (LMP Unknown) Comment: Mirena IUD 8/25/2022  SpO2 99%   BMI 48.37 kg/m²       Physical Exam  HENT:      Head: Normocephalic and atraumatic.      Mouth/Throat:      Mouth: Mucous membranes are moist.   Cardiovascular:      Rate and Rhythm: Normal rate.   Pulmonary:      Effort: Pulmonary effort is normal. No respiratory distress.   Musculoskeletal:         General: No swelling or deformity.      Cervical back: Normal range of motion.   Skin:     General: Skin is dry.      Findings: No rash.   Neurological:      General: No focal deficit present.      Mental Status: She is alert and oriented to person, place, and time.   Psychiatric:      Comments: Normal affect                      Assessment/Plan     Problem List Items Addressed This Visit             ICD-10-CM    Fibromyalgia M79.7    Relevant Orders    Vitamin D 25-Hydroxy,Total (for eval of Vitamin D levels)     Other Visit Diagnoses         Codes    Chronic fatigue    -  Primary R53.82    Relevant Orders    Iron and TIBC    Ferritin    CBC and Auto Differential    Vitamin B12 deficiency     E53.8    Relevant Orders    Vitamin B12    Vitamin D deficiency     E55.9    Relevant Orders    Vitamin D 25-Hydroxy,Total (for eval of Vitamin D levels)          Fatigue is her biggest issue. Make " sure not iron deficient. Optimize sleep. Has anemia on recent blood work. Obtain current iron studies and b12 levels. Encouraged her strongly to restart cpap immediately.   Concern for elevated vitamin d because 10,000 international units is a lot of vitmain d to be taking daily. Recheck level. (Has orders)  Discussed how exercise helps to improve energy levels by improving endurance. Start small.   Get your labs done  Try out cpap new mask. Would like for you to wear cpap nightly.   Strive to go to the therapy pool twice a week.   Pablo's ear pillows to help block out the noise of the noise.  Recommend Follow up in : 2 months    Luiza Stone MD PhD    Time Spent  Prep time on day of patient encounter: 5 minutes  Time spent directly with patient, family or caregiver: 55 minutes  Additional Time Spent on Patient Care Activities: 0 minutes  Documentation Time: 5 minutes  Other Time Spent: 0 minutes  Total: 65 minutes

## 2025-02-19 NOTE — TELEPHONE ENCOUNTER
Pt informed of urine cx mixed results. Pt symptoms are improving. Informed to follow up if no resolution of symptoms. Verbalized understanding.

## 2025-02-20 ENCOUNTER — APPOINTMENT (OUTPATIENT)
Dept: PHYSICAL THERAPY | Facility: CLINIC | Age: 35
End: 2025-02-20
Payer: COMMERCIAL

## 2025-02-24 ENCOUNTER — APPOINTMENT (OUTPATIENT)
Dept: BEHAVIORAL HEALTH | Facility: CLINIC | Age: 35
End: 2025-02-24
Payer: COMMERCIAL

## 2025-02-24 ENCOUNTER — TREATMENT (OUTPATIENT)
Dept: PHYSICAL THERAPY | Facility: CLINIC | Age: 35
End: 2025-02-24
Payer: COMMERCIAL

## 2025-02-24 DIAGNOSIS — M54.42 CHRONIC LOW BACK PAIN WITH BILATERAL SCIATICA, UNSPECIFIED BACK PAIN LATERALITY: Primary | ICD-10-CM

## 2025-02-24 DIAGNOSIS — G89.29 CHRONIC LOW BACK PAIN WITH BILATERAL SCIATICA, UNSPECIFIED BACK PAIN LATERALITY: Primary | ICD-10-CM

## 2025-02-24 DIAGNOSIS — M54.41 CHRONIC LOW BACK PAIN WITH BILATERAL SCIATICA, UNSPECIFIED BACK PAIN LATERALITY: Primary | ICD-10-CM

## 2025-02-24 NOTE — PROGRESS NOTES
Physical Therapy                 Therapy Communication Note    Patient Name: Sasha Longoria  MRN: [id number]  Department: [facility]  Today's Date: 2/24/2025     Discipline: Physical Therapy    Missed Visit Reason: still recovering from procedure    Missed Time: Cancel

## 2025-02-25 ENCOUNTER — OFFICE VISIT (OUTPATIENT)
Dept: URGENT CARE | Age: 35
End: 2025-02-25
Payer: COMMERCIAL

## 2025-02-25 ENCOUNTER — APPOINTMENT (OUTPATIENT)
Dept: PHYSICAL THERAPY | Facility: CLINIC | Age: 35
End: 2025-02-25
Payer: COMMERCIAL

## 2025-02-25 ENCOUNTER — APPOINTMENT (OUTPATIENT)
Dept: PODIATRY | Facility: CLINIC | Age: 35
End: 2025-02-25
Payer: COMMERCIAL

## 2025-02-25 VITALS
RESPIRATION RATE: 18 BRPM | OXYGEN SATURATION: 97 % | WEIGHT: 257 LBS | SYSTOLIC BLOOD PRESSURE: 122 MMHG | TEMPERATURE: 98.2 F | HEART RATE: 95 BPM | DIASTOLIC BLOOD PRESSURE: 83 MMHG | HEIGHT: 61 IN | BODY MASS INDEX: 48.52 KG/M2

## 2025-02-25 DIAGNOSIS — M54.6 ACUTE BILATERAL THORACIC BACK PAIN: Primary | ICD-10-CM

## 2025-02-25 DIAGNOSIS — M54.50 ACUTE LOW BACK PAIN WITHOUT SCIATICA, UNSPECIFIED BACK PAIN LATERALITY: ICD-10-CM

## 2025-02-25 RX ORDER — METHOCARBAMOL 500 MG/1
TABLET, FILM COATED ORAL
Qty: 42 TABLET | Refills: 0 | Status: SHIPPED | OUTPATIENT
Start: 2025-02-25

## 2025-02-25 RX ORDER — METHOCARBAMOL 500 MG/1
TABLET, FILM COATED ORAL
Qty: 42 TABLET | Refills: 0 | Status: SHIPPED | OUTPATIENT
Start: 2025-02-25 | End: 2025-02-25

## 2025-02-25 RX ORDER — METHYLPREDNISOLONE SODIUM SUCCINATE 125 MG/2ML
125 INJECTION INTRAMUSCULAR; INTRAVENOUS ONCE
Status: COMPLETED | OUTPATIENT
Start: 2025-02-25 | End: 2025-02-25

## 2025-02-25 RX ADMIN — METHYLPREDNISOLONE SODIUM SUCCINATE 125 MG: 125 INJECTION INTRAMUSCULAR; INTRAVENOUS at 17:06

## 2025-02-25 ASSESSMENT — ENCOUNTER SYMPTOMS
CHEST TIGHTNESS: 0
VOMITING: 0
DIARRHEA: 0
SHORTNESS OF BREATH: 0
AGITATION: 0
NUMBNESS: 0
BACK PAIN: 1
CONFUSION: 0
DYSURIA: 0
ABDOMINAL PAIN: 0
WEAKNESS: 0
DIFFICULTY URINATING: 0
NAUSEA: 0
CHILLS: 0
FATIGUE: 0
HEMATURIA: 0
JOINT SWELLING: 0
ARTHRALGIAS: 0
COUGH: 0
FEVER: 0
FLANK PAIN: 0

## 2025-02-25 ASSESSMENT — PAIN SCALES - GENERAL: PAINLEVEL_OUTOF10: 8

## 2025-02-25 NOTE — PROGRESS NOTES
Subjective   Patient ID: Sasha Longoria is a 34 y.o. female. They present today with a chief complaint of Back Pain.    History of Present Illness  Pt presented with acute middle back pain since this morning. Denies injury and denies numbness, tingling, weakness, loss of control of BM and bladder. Hx of scoliosis but states feeling pain not her typical flare up of scoliosis pain.       Back Pain  This is a new problem. The current episode started yesterday. The problem occurs constantly. The problem is unchanged. The pain is present in the sacro-iliac. The quality of the pain is described as aching, cramping and shooting. The pain does not radiate. The pain is at a severity of 8/10. The pain is The same all the time. The symptoms are aggravated by bending, coughing, position, lying down, standing and twisting. Stiffness is present All day. Pertinent negatives include no abdominal pain, chest pain, dysuria, fever, numbness or weakness. Risk factors include obesity.       Past Medical History  Allergies as of 02/25/2025    (No Known Allergies)       (Not in a hospital admission)       Past Medical History:   Diagnosis Date    Abnormal Pap smear of cervix     Anxiety     Arthritis ~ 2013    Chronic pain disorder     Depression     Eczema ~ 2005    Fibromyalgia, primary     GERD (gastroesophageal reflux disease) ~2018    Headache     Headache, tension-type     HPV (human papilloma virus) infection     Joint pain     Low back pain     Memory loss     Migraine     Neck pain     Neuromuscular disorder (Multi) 2020    Numbness     Obstructive sleep apnea     Polycystic ovary syndrome     Scoliosis ~2005    Visual impairment ~2000       Past Surgical History:   Procedure Laterality Date    OTHER SURGICAL HISTORY  09/25/2019    Tonsillectomy with adenoidectomy    OTHER SURGICAL HISTORY  04/26/2022    No history of surgery    TONSILLECTOMY          reports that she has never smoked. She has never used smokeless  "tobacco. She reports current alcohol use of about 1.0 standard drink of alcohol per week. She reports that she does not currently use drugs after having used the following drugs: Marijuana. Frequency: 4.00 times per week.    Review of Systems  Review of Systems   Constitutional:  Negative for chills, fatigue and fever.   Respiratory:  Negative for cough, chest tightness and shortness of breath.    Cardiovascular:  Negative for chest pain.   Gastrointestinal:  Negative for abdominal pain, diarrhea, nausea and vomiting.   Genitourinary:  Negative for difficulty urinating, dysuria, flank pain, hematuria and urgency.   Musculoskeletal:  Positive for back pain. Negative for arthralgias and joint swelling.   Skin:  Negative for rash.   Neurological:  Negative for weakness and numbness.   Psychiatric/Behavioral:  Negative for agitation and confusion.                                   Objective    Vitals:    02/25/25 1640   BP: 122/83   Pulse: 95   Resp: 18   Temp: 36.8 °C (98.2 °F)   TempSrc: Oral   SpO2: 97%   Weight: 117 kg (257 lb)   Height: 1.549 m (5' 1\")     No LMP recorded (lmp unknown). (Menstrual status: IUD).    Physical Exam  Constitutional:       Appearance: Normal appearance.   HENT:      Head: Normocephalic and atraumatic.      Nose: Nose normal.   Cardiovascular:      Rate and Rhythm: Normal rate and regular rhythm.      Heart sounds: No murmur heard.  Pulmonary:      Effort: Pulmonary effort is normal.      Breath sounds: Normal breath sounds.   Abdominal:      General: Abdomen is flat.      Palpations: Abdomen is soft.   Musculoskeletal:         General: Tenderness present. No swelling, deformity or signs of injury.      Lumbar back: Negative right straight leg raise test and negative left straight leg raise test.      Comments: Tenderness along the bilateral side of middle back with mild spasm, no deformity, no bony tenderness   Skin:     General: Skin is warm and dry.   Neurological:      Mental Status: " She is alert and oriented to person, place, and time.   Psychiatric:         Mood and Affect: Mood normal.         Procedures    Point of Care Test & Imaging Results from this visit  No results found for this visit on 02/25/25.   No results found.    Diagnostic study results (if any) were reviewed by Mirtha Kelly PA-C.    Assessment/Plan   Allergies, medications, history, and pertinent labs/EKGs/Imaging reviewed by Mirtha Kelly PA-C.     Medical Decision Making  Acute bone injury unlikely based on history   Possible acute muscle spasm which might be aggravated by scoliosis  No UTI symptoms and denies blood in urine or hx of kidney stone, renal colic less likely  Will treat as acute muscle spasm  Orders and Diagnoses  Diagnoses and all orders for this visit:  Acute bilateral thoracic back pain  -     methylPREDNISolone sodium succinate (PF) (SOLU-Medrol) injection 125 mg  -     methocarbamol (Robaxin) 500 mg tablet; 2tabs every 8hours for 7days, no driving after taking med  Acute low back pain without sciatica, unspecified back pain laterality      Medical Admin Record  Administrations This Visit       methylPREDNISolone sodium succinate (PF) (SOLU-Medrol) injection 125 mg       Admin Date  02/25/2025 Action  Given Dose  125 mg Route  intramuscular Documented By  Michaela Sahu MA                    Patient disposition: Home    Electronically signed by Mirtha Kelly PA-C  5:12 PM

## 2025-02-25 NOTE — PATIENT INSTRUCTIONS
Take muscle relaxer as instructed, no driving after taking med  NSAIDS as needed  F/U with PCP for lingering or worsening of condition   ER for red flags

## 2025-02-26 ENCOUNTER — SPECIALTY PHARMACY (OUTPATIENT)
Dept: PHARMACY | Facility: CLINIC | Age: 35
End: 2025-02-26

## 2025-02-27 ENCOUNTER — APPOINTMENT (OUTPATIENT)
Dept: PHYSICAL THERAPY | Facility: CLINIC | Age: 35
End: 2025-02-27
Payer: COMMERCIAL

## 2025-02-28 ENCOUNTER — APPOINTMENT (OUTPATIENT)
Dept: PHARMACY | Facility: HOSPITAL | Age: 35
End: 2025-02-28
Payer: COMMERCIAL

## 2025-02-28 DIAGNOSIS — E88.819 INSULIN RESISTANCE: ICD-10-CM

## 2025-02-28 PROCEDURE — RXMED WILLOW AMBULATORY MEDICATION CHARGE

## 2025-02-28 NOTE — PROGRESS NOTES
"  Patient ID: Sasha Longoria is a 34 y.o. female who presents for No chief complaint on file..    Referring Provider: STEPHANIE Noriega    Pt was referred for weight managment, insulin resistance, interested in Continuous glucose monitor   Last visit with referring provider: 3/1/24    Subjective     Preferred Pharmacy:    [facility] - [place], [place] - [address]  [address] [place] [address]  Phone: [phone] Fax: [phone]    [facility] - [place], [place] - [address]  [address]  [facility] [address]  Phone: [phone] Fax: [phone]    [facility] #[id number] - [place], [place] - [address]  [address] [address]  Phone: [phone] Fax: [phone]     Specialty Pharmacy  [address] [address]  Phone: [phone] Fax: [phone]      Adherence:   Do you have copays on your medications? Yes  Do you have trouble affording your current medications? No, but in \"limbo\" was laid off, still looking for a job. Has previous employers insurance currently through [employer] (has ~2 more months remaining).   How many doses of medication did you miss in the last 7 days? 1 dose, morning most often, so many pills, gets distracted and forgets to come back to take all meds.     Subjective/Objective:      Patient identified goal:   Increase endurance (VO2 Max is \"terrible\")  Energy to do ADL, regaining independence  More active, without needing a week of recovery time  More comfortable physically  \"A few years ago\" was at 147 lbs and felt good, today desires to be at a size that is healthy and manageable to maintain.     Onset:   When fibromyalgia was triggered, fall of 2020.   Lifetime of chronic stress and trauma. Traumatic event occurred at this time.   Used to be active, exercising 6 days/week, noticed she couldn't do what she usually could with weight training. " "    Past success:   X 2 years exercised 6 d/week with an active rest day along with caloric restriction.     Support network:   Partner  Therapist  Friends    How long implementing diet/lifestyle change for weight loss:   3 months this time, previously x 2 years.     Disordered eating patterns:   Binge eating disorder without purging - not current- when seeking comfort    Concurrent chronic conditions:   Lab Results   Component Value Date    CHHDL 3.4 11/07/2024    TRIG 82 11/07/2024       Prediabetes/Diabetes? Insulin resistance (TAURUS-IR 4.4)    Pertinent PMH Review:   PMH of Pancreatitis: No  PMH of Gallbladder disease: No  PMH of Delayed Gastric Emptying:  Unsure, hx of stomach cramps/pains/gassy/bloating  PMH of MTC: No  PMH of Retinopathy: No, visits eye doctor regularly because of \"potential retinal detachment in one eye\"   PMH of Urinary Tract Infections: No  PMH of Suicidal Ideation: Yes, past.   Female on oral contraceptive? IUD    Migraines? Yes, 12-13 years ago.   Allergies? Hives/Rash with intensive immune response, has had immune panels that came back negative. Sensitive skin, dermatitis.     Anxiety? Yes    Thyroid health:   Lab Results   Component Value Date    TSH 2.42 11/07/2024        Medications potentially contributing to weight gain:   Antipsychotics/Mood stabilizers/Antiepileptics/Nerve pain: gabapentin,      Medications tried/stopped for weight management:     None, past provider tried Wegovy but insurance denied because also on topiramate.     HPI    Objective     LMP  (LMP Unknown) Comment: Mirena IUD 8/25/2022     Labs  Lab Results   Component Value Date    BILITOT 0.4 11/07/2024    CALCIUM 8.8 11/07/2024    CO2 32 11/07/2024     11/07/2024    CREATININE 0.82 11/07/2024    GLUCOSE 74 11/07/2024    ALKPHOS 77 11/07/2024    K 4.4 11/07/2024    PROT 6.0 (L) 11/07/2024     11/07/2024    AST 12 11/07/2024    ALT 16 11/07/2024    BUN 11 11/07/2024    ANIONGAP 10 11/07/2024    ALBUMIN " "4.0 11/07/2024     Lab Results   Component Value Date    TRIG 82 11/07/2024    CHOL 139 11/07/2024    LDLCALC 82 11/07/2024    HDL 40.4 11/07/2024     Lab Results   Component Value Date    HGBA1C 4.6 11/07/2024    HGBA1C 5.1 02/29/2024    HGBA1C 5.1 02/20/2024     The ASCVD Risk score (Yesenia DK, et al., 2019) failed to calculate for the following reasons:    The 2019 ASCVD risk score is only valid for ages 40 to 79  No results found for: \"VITD25\"    Current Outpatient Medications on File Prior to Visit   Medication Sig Dispense Refill    alpha lipoic acid 600 mg capsule Take by mouth. 1 capsule daily after meal 1 week  Then 2 capsules for 1 week  Then 3 capsules for 1 week  Then 4 capsules daily.      cholecalciferol (Vitamin D-3) 25 MCG (1000 UT) capsule Take 2 capsules (50 mcg) by mouth once daily.      clobetasol (Temovate) 0.05 % ointment 1 Application once daily as needed.      coenzyme Q-10 (Co Q-10) 200 mg capsule Take 1 capsule (200 mg) by mouth 3 times a day.      cyanocobalamin, vitamin B-12, 1,000 mcg/mL drops Take by mouth once daily. 1 dropper per day      dicyclomine (Bentyl) 10 mg capsule Take 1 capsule (10 mg) by mouth 4 times a day as needed (abdominal spasm). 60 capsule 2    dihydroergotamine (Trudhesa) 0.725 mg/pump act. (4 mg/mL) nasal spray Administer 1 spray into each nostril every 1 hour if needed for migraine. Dose may be repeated in 1 hour after the first dose. No more than 2 doses within 24 hours or 3 doses within 7 days. 6 each 3    divalproex (Depakote ER) 250 mg 24 hr tablet Take 1 tablet (250 mg) by mouth once daily. 90 tablet 0    DULoxetine (Cymbalta) 60 mg DR capsule Take 1 capsule (60 mg) by mouth once daily. Do not crush or chew.      ferrous sulfate, 325 mg ferrous sulfate, tablet Take 1 tablet (325 mg) by mouth once daily with breakfast. 48 tablet 3    fremanezumab (Ajovy Autoinjector) 225 mg/1.5 mL auto-injector Inject 1 Pen (225 mg) under the skin every 28 (twenty-eight) days. " 1.5 mL 2    ketoconazole (NIZOral) 2 % shampoo Lather onto scalp and let sit for 5 mins before rinsing once daily until improvement.  May decrease to once-twice weekly for maintenance.      levonorgestrel (Mirena) 21 mcg/24 hours (8 yrs) 52 mg IUD by intrauterine route.      MAGNESIUM GLYCINATE ORAL Take 1 capsule by mouth once daily.      methocarbamol (Robaxin) 500 mg tablet 2tabs every 8hours for 7days, no driving after taking med 42 tablet 0    [] nitrofurantoin, macrocrystal-monohydrate, (Macrobid) 100 mg capsule Take 1 capsule (100 mg) by mouth 2 times a day for 7 days. 14 capsule 0    norethindrone (Aygestin) 5 mg tablet Take 1 tablet (5 mg) by mouth once daily. 90 tablet 3    omeprazole (PriLOSEC) 40 mg DR capsule Take 1 capsule (40 mg) by mouth once daily in the morning. Take before meals. 30 capsule 11    ondansetron (Zofran) 4 mg tablet Take 2 tablets (8 mg) by mouth every 8 hours if needed for nausea or vomiting. 20 tablet 1    sennosides (Senokot) 8.6 mg tablet Take 1 tablet (8.6 mg) by mouth once daily. 90 tablet 3    sulfaSALAzine (Azulfidine) 500 mg DR tablet       tirzepatide, weight loss, (Zepbound) 5 mg/0.5 mL injection Inject 5 mg under the skin every 7 days. 4 each 11    triamcinolone (Kenalog) 0.1 % ointment Apply to elbows twice daily only as needed for irritation.  Do not apply more than 21 days per month.      [DISCONTINUED] methocarbamol (Robaxin) 500 mg tablet 2tabs every 8hours for 7days, no driving after taking med 42 tablet 0     Current Facility-Administered Medications on File Prior to Visit   Medication Dose Route Frequency Provider Last Rate Last Admin    heparin flush 100 unit/mL syringe 500 Units  500 Units intra-catheter PRN Dre Justice MD   500 Units at 25 1601    heparin lock flush (porcine) 10 unit/mL injection 10 Units  10 Units intra-catheter Once Dre Justice MD            Medication and allergy reconciliation completed     Drug Interactions        Assessment/Plan   Zepbound  Tolerating 5mg dose well, nausea improved  Would like to try a dose increase again after current supply is used up  Has 6 doses on hand (insurance only covers 90 days at a time  She has enrolled in a program provided by her insurance in order to obtain zepbound coverage, finds the program beneficial.   Weight loss: 250 lbs  Continues to fell tired, had been tapering off meds (as directed by her providers)  She would like to go back to the therapy pool at least once weekly   Has 2-5 lbs weights at home  Has been working on removing endocrine disrupters from her environment  Part of a support group for non-toxic living     Working with Wendy Aguilar DC for Functional Medicine  Currently taking many supplements       CONTINUE  Zepbound 5mg once weekly  **Update [facility] pharmacy is out of network, patient would prefer rx is sent to [facility].     Follow-up: 04/25/25 10:20am     Time spent with pt: Total length of time 15 (minutes) of the encounter and more than 50% was spent counseling the patient.      Briseida Polanco, Pharm.D, [facility], [facility]  Clinical Pharmacist  Pharmacy Services  [phone]    Continue all meds under the continuation of care with the referring provider and clinical pharmacy team.    Verbal consent to manage patient's drug therapy was obtained from the patient and/or an individual authorized to act on behalf of a patient. They were informed they may decline to participate or withdraw from participation in pharmacy services at any time.

## 2025-03-01 ENCOUNTER — OFFICE VISIT (OUTPATIENT)
Dept: URGENT CARE | Age: 35
End: 2025-03-01
Payer: COMMERCIAL

## 2025-03-01 DIAGNOSIS — S29.012A STRAIN OF THORACIC BACK REGION: Primary | ICD-10-CM

## 2025-03-01 RX ORDER — CYCLOBENZAPRINE HCL 10 MG
5 TABLET ORAL 3 TIMES DAILY PRN
Qty: 20 TABLET | Refills: 0 | Status: SHIPPED | OUTPATIENT
Start: 2025-03-01 | End: 2025-03-11

## 2025-03-01 ASSESSMENT — ENCOUNTER SYMPTOMS
HEMATURIA: 0
DYSURIA: 0
NUMBNESS: 0
WEAKNESS: 0
CHILLS: 0
ABDOMINAL PAIN: 0
FEVER: 0
BACK PAIN: 1

## 2025-03-01 NOTE — PROGRESS NOTES
"Urgent Care Virtual Video Visit    Patient Location: Patient's home  Provider Location: [facility]    Video visit completed with realtime synchronous video/audio connection. Informed consent was obtained from the patient. Patient was made aware that my evaluation and diagnosis are limited due to the fact that we are not in the same room during the interview and that this is a virtual encounter that took place via videoconferencing. Patient verbalized understanding.     Reports she was seen for bilateral mid-low back pain  She was treated with medrol taper and methocarbamol. She states she has been taking them and it is helping, though still has pain. She states today she reached in the trunk of her car and notes her mid back felt an intense spasm and that it \"seized up\". She states the pain is hurting much worse. She denies any injury or falls that initially caused the pain.     DENIES: radiation of pain, numbness, tingling, muscle weakness.  Notes she has fibromyalgia and scoliosis (mild).  She states she is physical therapy for her back, though hasn't been in a few weeks due to her surgery for port placement. She has an appointment to go this coming week. She states she may have a spinal fusion surgery if she doesn't have improvement with PT.            Review of Systems   Constitutional:  Negative for chills and fever.   Gastrointestinal:  Negative for abdominal pain.   Genitourinary:  Negative for dysuria and hematuria.   Musculoskeletal:  Positive for back pain.   Neurological:  Negative for weakness and numbness.      Physical Exam  Constitutional:       Comments: Appears uncomfortable   Musculoskeletal:      Thoracic back: Decreased range of motion (pain worse with left sided twisting and bending.).      Lumbar back: Negative right straight leg raise test and negative left straight leg raise test.      Comments: Patient reports tightness and pain with self palpation of bilateral parathoracic muscles  "   Skin:     Comments: No rash, bruising or redness to back      Assessment/Plan    MDM- History and examination consistent with thoracic strain, no evidence of radiculopathy, acute cord compression, infection, or other vascular process at time. Though we did discuss the limitations of what we can assess for on telemedicine exam. She was given rx for cyclobenzaprine, which she feels has been more helpful for her in the past. She has previously tolerated with her cymbalta without history of serotonin syndrome. She was advised to use this and STOP methocarbamol. She was encourage ice/heat application, gentle stretching and massage. She was advised to schedule follow up with PT. We discussed to schedule in person visit with any lack of improvement or with any worsening of symptoms or new symptoms.  Patient verbalized understanding and agrees with plan.      Patient disposition: Home    Electronically signed by Allison Melendez PA-C  12:26 PM

## 2025-03-03 ENCOUNTER — PHARMACY VISIT (OUTPATIENT)
Dept: PHARMACY | Facility: CLINIC | Age: 35
End: 2025-03-03
Payer: COMMERCIAL

## 2025-03-03 ENCOUNTER — APPOINTMENT (OUTPATIENT)
Dept: NEUROSURGERY | Facility: CLINIC | Age: 35
End: 2025-03-03
Payer: COMMERCIAL

## 2025-03-03 DIAGNOSIS — M79.7 FIBROMYALGIA: Primary | ICD-10-CM

## 2025-03-03 RX ORDER — KETAMINE HCL IN NACL, ISO-OSM 100MG/10ML
SYRINGE (ML) INJECTION ONCE
Status: CANCELLED | OUTPATIENT
Start: 2025-03-04

## 2025-03-03 RX ORDER — KETOROLAC TROMETHAMINE 30 MG/ML
30 INJECTION, SOLUTION INTRAMUSCULAR; INTRAVENOUS ONCE
Status: CANCELLED | OUTPATIENT
Start: 2025-03-04 | End: 2025-03-04

## 2025-03-04 ENCOUNTER — INFUSION (OUTPATIENT)
Dept: INFUSION THERAPY | Facility: CLINIC | Age: 35
End: 2025-03-04
Payer: COMMERCIAL

## 2025-03-04 VITALS
SYSTOLIC BLOOD PRESSURE: 127 MMHG | HEART RATE: 98 BPM | RESPIRATION RATE: 19 BRPM | TEMPERATURE: 98.1 F | OXYGEN SATURATION: 96 % | DIASTOLIC BLOOD PRESSURE: 63 MMHG

## 2025-03-04 DIAGNOSIS — M79.7 FIBROMYALGIA: ICD-10-CM

## 2025-03-04 PROBLEM — E28.2 PCOS (POLYCYSTIC OVARIAN SYNDROME): Status: ACTIVE | Noted: 2025-03-04

## 2025-03-04 PROBLEM — G47.33 OSA (OBSTRUCTIVE SLEEP APNEA): Status: ACTIVE | Noted: 2024-10-09

## 2025-03-04 LAB
17OHP SERPL-MCNC: NORMAL NG/DL
PREGNANCY TEST URINE, POC: NEGATIVE
PROLACTIN SERPL-MCNC: 20.4 NG/ML
TESTOST FREE SERPL-MCNC: NORMAL PG/ML
TESTOST SERPL-MCNC: NORMAL NG/DL

## 2025-03-04 PROCEDURE — 2500000004 HC RX 250 GENERAL PHARMACY W/ HCPCS (ALT 636 FOR OP/ED): Performed by: NURSE PRACTITIONER

## 2025-03-04 PROCEDURE — 96368 THER/DIAG CONCURRENT INF: CPT | Mod: INF

## 2025-03-04 PROCEDURE — 96365 THER/PROPH/DIAG IV INF INIT: CPT | Mod: INF

## 2025-03-04 PROCEDURE — 81025 URINE PREGNANCY TEST: CPT

## 2025-03-04 PROCEDURE — 2500000004 HC RX 250 GENERAL PHARMACY W/ HCPCS (ALT 636 FOR OP/ED): Performed by: ANESTHESIOLOGY

## 2025-03-04 PROCEDURE — 96375 TX/PRO/DX INJ NEW DRUG ADDON: CPT | Mod: INF

## 2025-03-04 RX ORDER — CLOTRIMAZOLE 1 %
CREAM WITH APPLICATOR VAGINAL
COMMUNITY
Start: 2025-02-21

## 2025-03-04 RX ORDER — KETOROLAC TROMETHAMINE 30 MG/ML
30 INJECTION, SOLUTION INTRAMUSCULAR; INTRAVENOUS ONCE
Status: COMPLETED | OUTPATIENT
Start: 2025-03-04 | End: 2025-03-04

## 2025-03-04 RX ORDER — DIPHENHYDRAMINE HYDROCHLORIDE 50 MG/ML
50 INJECTION INTRAMUSCULAR; INTRAVENOUS AS NEEDED
OUTPATIENT
Start: 2025-03-18

## 2025-03-04 RX ORDER — KETAMINE HCL IN NACL, ISO-OSM 100MG/10ML
SYRINGE (ML) INJECTION ONCE
OUTPATIENT
Start: 2025-03-18

## 2025-03-04 RX ORDER — METOPROLOL TARTRATE 25 MG/1
25 TABLET, FILM COATED ORAL ONCE
OUTPATIENT
Start: 2025-03-18 | End: 2025-03-18

## 2025-03-04 RX ORDER — HYDROCORTISONE 25 MG/G
OINTMENT TOPICAL
COMMUNITY
Start: 2024-10-04

## 2025-03-04 RX ORDER — BUPROPION HYDROCHLORIDE 150 MG/1
TABLET ORAL
COMMUNITY
Start: 2024-12-23 | End: 2025-03-05 | Stop reason: SDUPTHER

## 2025-03-04 RX ORDER — NITROGLYCERIN 0.4 MG/1
0.4 TABLET SUBLINGUAL ONCE
OUTPATIENT
Start: 2025-03-18 | End: 2025-03-18

## 2025-03-04 RX ORDER — PRAZOSIN HYDROCHLORIDE 2 MG/1
2 CAPSULE ORAL NIGHTLY
COMMUNITY
Start: 2025-01-21

## 2025-03-04 RX ORDER — FAMOTIDINE 10 MG/ML
20 INJECTION, SOLUTION INTRAVENOUS ONCE AS NEEDED
OUTPATIENT
Start: 2025-03-18

## 2025-03-04 RX ORDER — ALBUTEROL SULFATE 0.83 MG/ML
3 SOLUTION RESPIRATORY (INHALATION) AS NEEDED
OUTPATIENT
Start: 2025-03-18

## 2025-03-04 RX ORDER — KETAMINE HCL IN NACL, ISO-OSM 100MG/10ML
SYRINGE (ML) INJECTION ONCE
Status: COMPLETED | OUTPATIENT
Start: 2025-03-04 | End: 2025-03-04

## 2025-03-04 RX ORDER — KETOCONAZOLE 20 MG/G
CREAM TOPICAL 2 TIMES DAILY
COMMUNITY
Start: 2024-10-04

## 2025-03-04 RX ORDER — DIPHENHYDRAMINE HYDROCHLORIDE 50 MG/ML
25 INJECTION INTRAMUSCULAR; INTRAVENOUS ONCE
OUTPATIENT
Start: 2025-03-18 | End: 2025-03-18

## 2025-03-04 RX ORDER — EPINEPHRINE 0.3 MG/.3ML
0.3 INJECTION SUBCUTANEOUS EVERY 5 MIN PRN
OUTPATIENT
Start: 2025-03-18

## 2025-03-04 RX ORDER — FLUCONAZOLE 150 MG/1
TABLET ORAL
COMMUNITY
Start: 2024-11-11

## 2025-03-04 RX ORDER — KETOROLAC TROMETHAMINE 30 MG/ML
30 INJECTION, SOLUTION INTRAMUSCULAR; INTRAVENOUS ONCE
OUTPATIENT
Start: 2025-03-18 | End: 2025-03-18

## 2025-03-04 RX ORDER — ONDANSETRON HYDROCHLORIDE 2 MG/ML
4 INJECTION, SOLUTION INTRAVENOUS ONCE
OUTPATIENT
Start: 2025-03-18 | End: 2025-03-18

## 2025-03-04 RX ORDER — ONDANSETRON HYDROCHLORIDE 2 MG/ML
4 INJECTION, SOLUTION INTRAVENOUS ONCE
Status: COMPLETED | OUTPATIENT
Start: 2025-03-04 | End: 2025-03-04

## 2025-03-04 RX ORDER — METOCLOPRAMIDE HYDROCHLORIDE 5 MG/ML
10 INJECTION INTRAMUSCULAR; INTRAVENOUS ONCE
OUTPATIENT
Start: 2025-03-18 | End: 2025-03-18

## 2025-03-04 RX ADMIN — ONDANSETRON 4 MG: 2 INJECTION INTRAMUSCULAR; INTRAVENOUS at 09:26

## 2025-03-04 RX ADMIN — KETOROLAC TROMETHAMINE 30 MG: 30 INJECTION, SOLUTION INTRAMUSCULAR at 09:26

## 2025-03-04 RX ADMIN — Medication: at 09:29

## 2025-03-04 RX ADMIN — HEPARIN 500 UNITS: 100 SYRINGE at 10:12

## 2025-03-04 RX ADMIN — PROPOFOL 100 MG: 10 INJECTION, EMULSION INTRAVENOUS at 09:29

## 2025-03-04 ASSESSMENT — PAIN - FUNCTIONAL ASSESSMENT
PAIN_FUNCTIONAL_ASSESSMENT: 0-10
PAIN_FUNCTIONAL_ASSESSMENT: 0-10

## 2025-03-04 ASSESSMENT — PAIN DESCRIPTION - DESCRIPTORS: DESCRIPTORS: ACHING;SORE

## 2025-03-04 ASSESSMENT — PAIN SCALES - GENERAL
PAINLEVEL_OUTOF10: 8
PAINLEVEL_OUTOF10: 8
PAINLEVEL_OUTOF10: 4

## 2025-03-04 NOTE — PROGRESS NOTES
S: Patient here for 29 opioid sparing pain infusion. Patient reports 50% reduction in pain after last infusion that lasted 1 week.    Purpose of pain infusion meds explained along with potential side effects.  Patient verbalized understanding.    B: Pain Issues. 8/10 generalized, back, knees, neck Is Patient breast feeding: No    A: Patient currently has pain described on flow sheet documentation. Designated  is [employer] @ [phone] Patient last ate solid food 4 hours ago, and had liquid 1 hours ago.    R: Plan; Obtain IV access, do patient risk assessment, and start opioid sparing infusion as ordered. Monitoring for S/S of adverse reactions.    Post infusion teaching provided. Patient verbalized understanding. VSS, Patient states pain is 4/10. Will assist patient to waiting car via wheelchair.

## 2025-03-04 NOTE — PATIENT INSTRUCTIONS
Today :We administered ketamine 30 mg-lidocaine 300 mg, propofol (Diprivan), ketorolac, and ondansetron.     For:   1. Fibromyalgia         Your next appointment is due in:  14 days        Please read the  Medication Guide that was given to you and reviewed during todays visit.     (Tell all doctors including dentists that you are taking this medication)     Go to the emergency room or call 911 if:  -You have signs of allergic reaction:   -Rash, hives, itching.   -Swollen, blistered, peeling skin.   -Swelling of face, lips, mouth, tongue or throat.   -Tightness of chest, trouble breathing, swallowing or talking     Call your doctor:  - If IV / injection site gets red, warm, swollen, itchy or leaks fluid or pus.     (Leave dressing on your IV site for at least 2 hours and keep area clean and dry  - If you get sick or have symptoms of infection or are not feeling well for any reason.    (Wash your hands often, stay away from people who are sick)  - If you have side effects from your medication that do not go away or are bothersome.     (Refer to the teaching your nurse gave you for side effects to call your doctor about)    - Common side effects may include:  stuffy nose, headache, feeling tired, muscle aches, upset stomach  - Before receiving any vaccines     - Call the [facility] at   If:  - You get sick, are on antibiotics, have had a recent vaccine, have surgery or dental work and your doctor wants your visit rescheduled.  - You need to cancel and reschedule your visit for any reason. Call at least 2 days before your visit if you need to cancel.   - Your insurance changes before your next visit.    (We will need to get approval from your new insurance. This can take up to two weeks.)     The [facility] is opened Monday thru Friday. We are closed on weekends and holidays.   Voice mail will take your call if the center is closed. If you leave a message please allow 24 hours for a  call back during weekdays. If you leave a message on a weekend/holiday, we will call you back the next business day.    A pharmacist is available Monday - Friday from 8:30AM to 3:30PM to help answer any questions you may have about your prescriptions(s). Please call pharmacy at:    [facility]: [phone]  [facility]: [phone]  [facility]: [phone]              [facility]      Pain Infusion Aftercare Instructions      1. It is normal to feel sedated, tired and low in energy after a pain infusion. DO NOT DRIVE, OPERATE ANY MACHINERY, OR MAKE ANY IMPORTANT DECISIONS FOR AT LEAST 24 HOURS AFTER THE INFUSION.     2. Call the pain center at [phone] with any problems, questions, or concerns.     3. Eat light after the infusion. If you feel queasy or sick to your stomach, laying down with your eyes closed may help. When you resume eating start with something mild like clear liquids, yogurt, applesauce, crackers, etc… Gradually advance to a regular diet.     4. Do not leave your house alone the evening of your pain infusion.     5. No alcohol or sedative medications, such as sleeping pills, for 24 hours after your pain infusion.     6. Resume all other prescribed medications unless directed otherwise by you physician.     7. If you have any medical emergencies, call 911 or go directly to the closest emergency room.

## 2025-03-05 ENCOUNTER — TREATMENT (OUTPATIENT)
Dept: PHYSICAL THERAPY | Facility: CLINIC | Age: 35
End: 2025-03-05
Payer: COMMERCIAL

## 2025-03-05 ENCOUNTER — APPOINTMENT (OUTPATIENT)
Dept: BEHAVIORAL HEALTH | Facility: CLINIC | Age: 35
End: 2025-03-05
Payer: COMMERCIAL

## 2025-03-05 DIAGNOSIS — F41.1 GENERALIZED ANXIETY DISORDER: ICD-10-CM

## 2025-03-05 DIAGNOSIS — R53.82 CHRONIC FATIGUE: ICD-10-CM

## 2025-03-05 DIAGNOSIS — F33.1 MODERATE EPISODE OF RECURRENT MAJOR DEPRESSIVE DISORDER: ICD-10-CM

## 2025-03-05 DIAGNOSIS — G89.29 CHRONIC LOW BACK PAIN WITH BILATERAL SCIATICA, UNSPECIFIED BACK PAIN LATERALITY: Primary | ICD-10-CM

## 2025-03-05 DIAGNOSIS — F43.10 PTSD (POST-TRAUMATIC STRESS DISORDER): ICD-10-CM

## 2025-03-05 DIAGNOSIS — G47.9 SLEEP DISTURBANCE: ICD-10-CM

## 2025-03-05 DIAGNOSIS — M54.41 CHRONIC LOW BACK PAIN WITH BILATERAL SCIATICA, UNSPECIFIED BACK PAIN LATERALITY: Primary | ICD-10-CM

## 2025-03-05 DIAGNOSIS — Z91.89 COMPLIANCE WITH MEDICATION REGIMEN: ICD-10-CM

## 2025-03-05 DIAGNOSIS — M54.42 CHRONIC LOW BACK PAIN WITH BILATERAL SCIATICA, UNSPECIFIED BACK PAIN LATERALITY: Primary | ICD-10-CM

## 2025-03-05 LAB
25(OH)D3+25(OH)D2 SERPL-MCNC: 88 NG/ML (ref 30–100)
BASOPHILS # BLD AUTO: 38 CELLS/UL (ref 0–200)
BASOPHILS NFR BLD AUTO: 0.4 %
CORTIS AM PEAK SERPL-MCNC: 16.9 MCG/DL
EOSINOPHIL # BLD AUTO: 508 CELLS/UL (ref 15–500)
EOSINOPHIL NFR BLD AUTO: 5.4 %
ERYTHROCYTE [DISTWIDTH] IN BLOOD BY AUTOMATED COUNT: 11.8 % (ref 11–15)
FERRITIN SERPL-MCNC: 113 NG/ML (ref 16–154)
HCT VFR BLD AUTO: 38.8 % (ref 35–45)
HGB BLD-MCNC: 12.3 G/DL (ref 11.7–15.5)
IRON SATN MFR SERPL: 32 % (CALC) (ref 16–45)
IRON SERPL-MCNC: 95 MCG/DL (ref 40–190)
LYMPHOCYTES # BLD AUTO: 3187 CELLS/UL (ref 850–3900)
LYMPHOCYTES NFR BLD AUTO: 33.9 %
MCH RBC QN AUTO: 32 PG (ref 27–33)
MCHC RBC AUTO-ENTMCNC: 31.7 G/DL (ref 32–36)
MCV RBC AUTO: 101 FL (ref 80–100)
MONOCYTES # BLD AUTO: 677 CELLS/UL (ref 200–950)
MONOCYTES NFR BLD AUTO: 7.2 %
NEUTROPHILS # BLD AUTO: 4991 CELLS/UL (ref 1500–7800)
NEUTROPHILS NFR BLD AUTO: 53.1 %
PLATELET # BLD AUTO: 293 THOUSAND/UL (ref 140–400)
PMV BLD REES-ECKER: 10.4 FL (ref 7.5–12.5)
RBC # BLD AUTO: 3.84 MILLION/UL (ref 3.8–5.1)
TIBC SERPL-MCNC: 298 MCG/DL (CALC) (ref 250–450)
VIT B12 SERPL-MCNC: 813 PG/ML (ref 200–1100)
WBC # BLD AUTO: 9.4 THOUSAND/UL (ref 3.8–10.8)

## 2025-03-05 PROCEDURE — 99205 OFFICE O/P NEW HI 60 MIN: CPT

## 2025-03-05 RX ORDER — BUPROPION HYDROCHLORIDE 150 MG/1
150 TABLET ORAL EVERY MORNING
Qty: 90 TABLET | Refills: 1 | Status: SHIPPED | OUTPATIENT
Start: 2025-03-05 | End: 2025-09-01

## 2025-03-05 RX ORDER — DULOXETIN HYDROCHLORIDE 60 MG/1
60 CAPSULE, DELAYED RELEASE ORAL DAILY
Qty: 90 CAPSULE | Refills: 1 | Status: SHIPPED | OUTPATIENT
Start: 2025-03-05 | End: 2025-09-01

## 2025-03-05 ASSESSMENT — COLUMBIA-SUICIDE SEVERITY RATING SCALE - C-SSRS
1. IN THE PAST MONTH, HAVE YOU WISHED YOU WERE DEAD OR WISHED YOU COULD GO TO SLEEP AND NOT WAKE UP?: NO
1. HAVE YOU WISHED YOU WERE DEAD OR WISHED YOU COULD GO TO SLEEP AND NOT WAKE UP?: YES

## 2025-03-05 ASSESSMENT — PATIENT HEALTH QUESTIONNAIRE - PHQ9
4. FEELING TIRED OR HAVING LITTLE ENERGY: NEARLY EVERY DAY
SUM OF ALL RESPONSES TO PHQ QUESTIONS 1-9: 13
9. THOUGHTS THAT YOU WOULD BE BETTER OFF DEAD, OR OF HURTING YOURSELF: NOT AT ALL
6. FEELING BAD ABOUT YOURSELF - OR THAT YOU ARE A FAILURE OR HAVE LET YOURSELF OR YOUR FAMILY DOWN: NOT AT ALL
2. FEELING DOWN, DEPRESSED OR HOPELESS: MORE THAN HALF THE DAYS
1. LITTLE INTEREST OR PLEASURE IN DOING THINGS: MORE THAN HALF THE DAYS
7. TROUBLE CONCENTRATING ON THINGS, SUCH AS READING THE NEWSPAPER OR WATCHING TELEVISION: MORE THAN HALF THE DAYS
SUM OF ALL RESPONSES TO PHQ9 QUESTIONS 1 & 2: 4
10. IF YOU CHECKED OFF ANY PROBLEMS, HOW DIFFICULT HAVE THESE PROBLEMS MADE IT FOR YOU TO DO YOUR WORK, TAKE CARE OF THINGS AT HOME, OR GET ALONG WITH OTHER PEOPLE: VERY DIFFICULT
10. IF YOU CHECKED OFF ANY PROBLEMS, HOW DIFFICULT HAVE THESE PROBLEMS MADE IT FOR YOU TO DO YOUR WORK, TAKE CARE OF THINGS AT HOME, OR GET ALONG WITH OTHER PEOPLE: SOMEWHAT DIFFICULT
5. POOR APPETITE OR OVEREATING: SEVERAL DAYS
3. TROUBLE FALLING OR STAYING ASLEEP: NEARLY EVERY DAY
8. MOVING OR SPEAKING SO SLOWLY THAT OTHER PEOPLE COULD HAVE NOTICED. OR THE OPPOSITE, BEING SO FIGETY OR RESTLESS THAT YOU HAVE BEEN MOVING AROUND A LOT MORE THAN USUAL: NOT AT ALL

## 2025-03-05 ASSESSMENT — ANXIETY QUESTIONNAIRES
5. BEING SO RESTLESS THAT IT IS HARD TO SIT STILL: SEVERAL DAYS
2. NOT BEING ABLE TO STOP OR CONTROL WORRYING: NEARLY EVERY DAY
GAD7 TOTAL SCORE: 14
4. TROUBLE RELAXING: MORE THAN HALF THE DAYS
6. BECOMING EASILY ANNOYED OR IRRITABLE: SEVERAL DAYS
1. FEELING NERVOUS, ANXIOUS, OR ON EDGE: NEARLY EVERY DAY
7. FEELING AFRAID AS IF SOMETHING AWFUL MIGHT HAPPEN: SEVERAL DAYS
3. WORRYING TOO MUCH ABOUT DIFFERENT THINGS: NEARLY EVERY DAY
IF YOU CHECKED OFF ANY PROBLEMS ON THIS QUESTIONNAIRE, HOW DIFFICULT HAVE THESE PROBLEMS MADE IT FOR YOU TO DO YOUR WORK, TAKE CARE OF THINGS AT HOME, OR GET ALONG WITH OTHER PEOPLE: VERY DIFFICULT

## 2025-03-05 NOTE — PROGRESS NOTES
Physical Therapy                 Therapy Communication Note    Patient Name: Sasha Longoria  MRN: [id number]  Department: [facility]  Today's Date: 3/5/2025     Discipline: Physical Therapy    Missed Visit Reason: Earlier appointment ran over/late. Unable to make this appointment.     Missed Time: Cancel

## 2025-03-05 NOTE — PROGRESS NOTES
"Sasha Longoria is a 34 y.o. female patient presenting for virtual NPV  Visit location: patient (Sasha, home), provider (STEPHANIE Raphael, virtual office)  Pt identify self by name, , and address    An interactive audio and video telecommunication system which permits real time communications between the patient (at the originating site) and provider (at the distant site) was utilized to provide this telehealth service.     Chief Complaint   Patient presents with    Anxiety    Depression    Panic Attack    Sleeping Problem    PTSD (Post-Traumatic Stress Disorder)      HPI    States she was referred by PCP, STEPHANIE Palmer for ongoing management of (dx with anxiety & depression - ), cPTSD ()/sleep disturbance (). States she previous saw a provider through [facility] for 4 yrs, was not happy with her care so requested to transfer to .     Initially dx with depression, then anxiety & depression - : \"lot of family issued, unstable environment at home, mentally ill family members and it was like that growing up, so at 19 went to get help because mom won't let me.     Sleep disturbance - 2016: \"traumatic event happened that started causing nightmares/night terrors, startling easily.\" Reports mild associated flashbacks. Complex PTSD was clinically dx in     States she had to take FMLA in  while on Zoloft d/t anxiety/depression    Currently prescribed: Wellbutrin  mg daily () (reduced from 300 mg d/t concerns of side effects of taking alongside other meds), Cymbalta 60 mg daily (reduced from 120 mg d/t side effects of sexual dysfunction related - symptoms are more manageable now). Have tried Effexor ineffective), Abilify (). Stopped Wellbutrin with Abilify around  because therapy was effective. States when she resumed care with psychology () & psychiatry (), was placed on Zoloft, became in " "effective overtime, Wellbutrin was re-added in 2023/2024. Have tried Trazodone (taken for several yrs - inconsistently effective), Mirtazapine (inconsistently helpful) - both ineffective. Was prescribed Prazosin 2 mg (stopped taking because it didn't help much). Vyvanse (small dose) - \"marginal improvement.\"     Trying trying to scale back with meds overall. States she has an appt with behavioral sleep medicine in 04/2025, may decide to take meds after.    States she has chronic medical conditions that are important: Fibromyalgia and CPOS, working with pain management and neuro. Has tried TMS  (completed in 08/2024) - helped with depression but not completely resolved. Continue to see counselor weekly outside of .     Main complain is fatigue, lethargy, brain fog from meds. Gets ketamine infusions q2wks for pain management and they do not affect mental health.     Reports difficulty staying awake especially when she has poor sleep quality caused by nightmares.       Current S/Sx:  -Mood swings/disorder or bipolar symptoms:   - mood swings - mild    Depression (single, recurrent, seasonal, specific intermittency):   -Other depressive sx/s: primarily significant fatigue fatigue/motivation/brain fog  -Fatigue/Energy: profound fatigue  -Motivation: poor   -Concentration: \"brain fog\"   -Feeling hope/help/worthless: sometimes feels helpless  -Sleep: sleeps vivid dreams overnight since 09/2024  -Appetite/Weight Changes: fluctuates appetite, no weight changes  - PHQ-9 (13)    SI/HI  -Reports passive hx of SI without intent/plan. Denies current suicidal intend/plan  -CSSRS Low risks    Weapon safety  -Guns/Weapons at home: denies    Psychosis/schizophrenia  - Psychosis: denies hallucinations/delusions, denies paranoia  - Intrusive thoughts: present    Anxiety: (generalized, social, agoraphobia, speaking, specific):   -Worry excessively: present/impactful - especially about relationships  -NINA-7 (14)    PTSD  - Flashbacks: " rare  -Nightmares: daily vivid dreams, nightmares - common  -Other symptoms: hx of night terrors (continue to occur 1-2 times/month), easy startling, fear being followed    OCD  - Obsessive/compulsive thoughts/acts: not assessed    ADHD  - states she was previously assessed by [facility] and  - found not to have ADHD. Foggy brain, poor concentration/fatigue/low motivation might be caused by over medication  - BAARS-IV Questionnaire: not administered     Panic attacks  -Onset: 2020 - where severe then, still get them now, but mild, less frequent/intense  -Duration: up to 1 hr  -Frequency: less frequent  -Associated s/sx: racing HR (up to 1 hr)  -Relieving factors: breathing techniques,   -Precipitating factors: anxiety around personal life  -Last one: a few days ago     HISTORY  PSYCH HISTORY  -Psych Hx: (dx with anxiety & depression - 2009), cPTSD (2020)/sleep disturbance (2016)  -Psych Hospitalization Hx: denies  -Suicide Attempt Hx: denies  -Self-Harm/Violence Hx: denies  -Current psych meds: Wellbutrin  mg daily (2009/2010) (reduced from 300 mg d/t concerns of side effects of taking alongside other meds), Cymbalta 60 mg daily (reduced from 120 mg d/t side effects of sexual dysfunction related - symptoms are more manageable now).  -Psych Med Hx: Vyvanse 20 (noticed minimal improvement), Strattera (25, 40, 80 mg - minimally beneficial). Have tried Effexor ineffective), Abilify (2009/2010). Stopped Wellbutrin with Abilify around 2010/2011 because therapy was effective. States when she resumed care with psychology (2020) & psychiatry (2021), was placed on Zoloft, became in effective overtime, Wellbutrin was re-added in 2023/2024. Have tried Trazodone (taken for several yrs - inconsistently effective), Mirtazapine (inconsistently helpful) - both ineffective. Was prescribed Prazosin 2 mg (stopped taking because it didn't help much). Just stopped Gabapentin (caused side effects)      SUBSTANCE USE HISTORY  -Substance Use Hx: denies  -ETOH: rarely  -Tobacco: denies  -Caffeine: coffee  -Substance Abuse Treatment Hx: denies     FAMILY HISTORY  -Family Psych Hx: all 6 siblings: depression, anxiety. Mom: depression. Father: depression/undx bipolar  -Family Suicide Hx: denies  -Family Substance Abuse Hx: mom's side: struggled with alcoholism, cannabis, narcotics, cocaine, father: abused narcotics, cocaine. Sister: struggled with heroin use     SOCIAL HISTORY  -Upbringing: Grew up with both parents. Has 6 siblings  -income: denies financial struggles  -safety: feels safe at home/generally  -Support system: average support system  -Trauma: Mixed childhood/adulthood, poor memory of possible childhood trauma. Denies physical trauma  -Education: bachelors  -Work: compliance office  -Marital Status: , has current partner  -Children: none  -Living situation: lives alone, partner around 1/2 time/week  -: denies  -Legal: denies      MEDICAL HISTORY  -PCP: DONALD Palmer-CNP  -TBI/head trauma/LOC/seizure hx: mild head injury in 2013, fell down stairs and hit head     REVIEW OF SYSTEMS  Review of Systems   Constitutional:         Fibromyalgia & PCOS, MIKAELA (prescribed CPAP- still finding the right type of mask)   All other systems reviewed and are negative.       PHYSICAL EXAM  Physical Exam  Psychiatric:         Attention and Perception: Attention and perception normal.         Mood and Affect: Mood is anxious and depressed. Affect is flat.         Speech: Speech normal.         Behavior: Behavior normal. Behavior is cooperative.         Thought Content: Thought content normal.         Cognition and Memory: Cognition and memory normal.         Judgment: Judgment normal.          IMPRESSION  Sasha Longoria is a 34 y.o. female patient presents for NPV today via virtual. Patient continue to meet criteria for moderate NINA, MDD, PTSD, sleep disturbance with nightmares/vivid  dreams/night terrors per subjective and objective assessments.  Patient was assessed for ADHD at 2 separate institutions and did not meet criteria.  She was tried on stimulant (Vyvanse)/nonstimulants (Strattera) without success (marginal improvement).  She was also tried and failed several antidepressants.  She is currently prescribed Cymbalta 60 mg and Wellbutrin , both medications where tapered down because of associated adverse reactions/side effects to current tolerable dose.  Patient continue to struggle with profound fatigue, low motivation, and brain fog, possibly associated with current medication regimen.  Patient has tried other therapies including TMS, ketamine infusions from pain management.  She concurrently struggles with fibromyalgia, was prescribed gabapentin, precipitated brain fog.  Reports mild less frequent/intense panic attacks, ongoing vivid dreams since September 2024 with occasional nightmares and night terrors about 2 times a month.  Reports mild mood swings.  Denies psychosis, but reports fear of being started associated paranoia.  Otherwise, denies SI/hallucinations/delusions/shira/hypomania.  Appetite fluctuates.  Denies substance use or significant side effects from current regimen.  Given previous complex treatment plan, discussed to complete a GeneSight test to narrow medication selection which could effectively manage symptoms, add modafinil to improve daytime fatigue/sleepiness.  CSA obtained today for modafinil, UDS pending.  Discussed to continue attending weekly counseling.  Will follow up with this provider in 4 weeks to continue monitoring, or sooner if needed.      Plan:     1. Reviewed diagnostic impression including subjective and objective data and provided education about NINA, MDD, PTSD, sleep disturbance, ADHD, panic attacks, bipolar disorder, and substance use/abuse, etiology, treatment recommendations including medication, therapy, course of treatment and  prognosis. Patient amenable to treatment plan.     2. Safety Assessment: History of passive thoughts without intent/plan (2020), but denies current thoughts of SI, plan/intent.  No h/o SA. RF include , unmarried/single, living alone/lack of social support, history of trauma/abuse, chronic medical illness, chronic pain, current psychiatric illness, feelings of hopelessness, and panic attacks. PF include strong coping skills, hopefulness/future orientation, and employment, engaged in care, future oriented, no guns.  Medium imminent risk     3. @  Problem List Items Addressed This Visit       PTSD (post-traumatic stress disorder)    Relevant Medications    DULoxetine (Cymbalta) 60 mg DR capsule    buPROPion XL (Wellbutrin XL) 150 mg 24 hr tablet    Generalized anxiety disorder    Relevant Medications    DULoxetine (Cymbalta) 60 mg DR capsule    Depression    Relevant Medications    DULoxetine (Cymbalta) 60 mg DR capsule    buPROPion XL (Wellbutrin XL) 150 mg 24 hr tablet    Other Relevant Orders    Drug Screen, Urine With Reflex to Confirmation     Other Visit Diagnoses       Sleep disturbance        With associated intermittent nightmares/night terrors    Chronic fatigue        Relevant Medications    buPROPion XL (Wellbutrin XL) 150 mg 24 hr tablet    Other Relevant Orders    Drug Screen, Urine With Reflex to Confirmation    Compliance with medication regimen        Relevant Orders    Drug Screen, Urine With Reflex to Confirmation           START modafinil  CONTINUE Wellbutrin  mg daily  CONTINUE Cymbalta 60 mg daily     Reviewed r/b/a, possible side effects of the medication. Client is aware about the benefit outweighs the risk.     4. INDIVIDUAL THERAPY: Private [facility] - [facility], weekly (~4yrs)     5. OARRS: last filled Gabapentin 300 mg on 12/12/2024 (270 caps x 90 days), Temazepam 7.5 mg on 11/11/2024      6. Labs reviewed    7. Last Urine Drug Screen  Date of Last Screen: None on file,  ordered    8. Controlled Substance Agreement:  Date of the Last Agreement: 3/5/2025  Reviewed Controlled Substance Agreement including but not limited to the benefits, risks, and alternatives to treatment with a Controlled Substance medication(s).     - Follow up with physical health providers as scheduled  - May follow up sooner if experiences worsening symptoms by calling  Psychiatry at [phone]  - Patient verbalized an understanding to call [facility] at [phone] ([facility]), [phone], or [phone]/go to the nearest emergency room if experiences thoughts of harm to self or others.       - Total time: 67 minutes via virtual

## 2025-03-06 ENCOUNTER — PATIENT MESSAGE (OUTPATIENT)
Dept: INTEGRATIVE MEDICINE | Facility: CLINIC | Age: 35
End: 2025-03-06
Payer: COMMERCIAL

## 2025-03-07 ENCOUNTER — OFFICE VISIT (OUTPATIENT)
Dept: URGENT CARE | Age: 35
End: 2025-03-07
Payer: COMMERCIAL

## 2025-03-07 ENCOUNTER — APPOINTMENT (OUTPATIENT)
Dept: PHYSICAL THERAPY | Facility: CLINIC | Age: 35
End: 2025-03-07
Payer: COMMERCIAL

## 2025-03-07 DIAGNOSIS — N89.8 VAGINAL IRRITATION: Primary | ICD-10-CM

## 2025-03-07 DIAGNOSIS — R30.0 DYSURIA: ICD-10-CM

## 2025-03-07 LAB
POC APPEARANCE, URINE: CLEAR
POC BILIRUBIN, URINE: NEGATIVE
POC BLOOD, URINE: ABNORMAL
POC COLOR, URINE: YELLOW
POC GLUCOSE, URINE: NEGATIVE MG/DL
POC KETONES, URINE: NEGATIVE MG/DL
POC LEUKOCYTES, URINE: NEGATIVE
POC NITRITE,URINE: NEGATIVE
POC PH, URINE: 7 PH
POC PROTEIN, URINE: NEGATIVE MG/DL
POC SPECIFIC GRAVITY, URINE: 1.01
POC UROBILINOGEN, URINE: 0.2 EU/DL
PREGNANCY TEST URINE, POC: NEGATIVE

## 2025-03-07 RX ORDER — FLUCONAZOLE 150 MG/1
TABLET ORAL
Qty: 2 TABLET | Refills: 0 | Status: SHIPPED | OUTPATIENT
Start: 2025-03-07

## 2025-03-07 ASSESSMENT — ENCOUNTER SYMPTOMS
CONSTITUTIONAL NEGATIVE: 1
ALLERGIC/IMMUNOLOGIC NEGATIVE: 1
DYSURIA: 1
GASTROINTESTINAL NEGATIVE: 1
HEMATOLOGIC/LYMPHATIC NEGATIVE: 1
FREQUENCY: 1
MUSCULOSKELETAL NEGATIVE: 1
PSYCHIATRIC NEGATIVE: 1
ENDOCRINE NEGATIVE: 1
CARDIOVASCULAR NEGATIVE: 1
EYES NEGATIVE: 1
NEUROLOGICAL NEGATIVE: 1
RESPIRATORY NEGATIVE: 1

## 2025-03-07 NOTE — PROGRESS NOTES
Urgent Care Virtual Video Visit    Patient Location: (Home)Subjective   Patient ID: Sasha Longoria is a 34 y.o. female. They present today with a chief complaint of No chief complaint on file..    History of Present Illness  Patient is a 35 y/o female presenting with x2 days of urinary frequency, urgency, burning, vaginal irritation. Patient reports completing Rx of Macrobid for suspected UTI x2 weeks prior however, urine C&S indicated no significant infection. Patient denies symptoms of hematuria, flank pain, abnormal vaginal discharge, odorous discharge, fever, chills, bodyaches, lethargy, weakness, chest pain/tightness/pressure, SOB, wheezing, N/V/D, ABD pain. No OTC medication reported to be taken.           Past Medical History  Allergies as of 03/07/2025    (No Known Allergies)       (Not in a hospital admission)       Past Medical History:   Diagnosis Date    Abnormal Pap smear of cervix     Anxiety     Arthritis ~ 2013    Chronic pain disorder     Depression     Eczema ~ 2005    Fibromyalgia, primary     GERD (gastroesophageal reflux disease) ~2018    Headache     Headache, tension-type     HPV (human papilloma virus) infection     Joint pain     Low back pain     Memory loss     Migraine     Neck pain     Neuromuscular disorder (Multi) 2020    Numbness     Obstructive sleep apnea     Polycystic ovary syndrome     Scoliosis ~2005    Visual impairment ~2000       Past Surgical History:   Procedure Laterality Date    OTHER SURGICAL HISTORY  09/25/2019    Tonsillectomy with adenoidectomy    OTHER SURGICAL HISTORY  04/26/2022    No history of surgery    TONSILLECTOMY          reports that she has never smoked. She has never used smokeless tobacco. She reports current alcohol use of about 1.0 standard drink of alcohol per week. She reports that she does not currently use drugs after having used the following drugs: Marijuana. Frequency: 4.00 times per week.    Review of Systems  Review of Systems    Constitutional: Negative.    HENT: Negative.     Eyes: Negative.    Respiratory: Negative.     Cardiovascular: Negative.    Gastrointestinal: Negative.    Endocrine: Negative.    Genitourinary:  Positive for dysuria, frequency and urgency.        Vaginal irritation   Musculoskeletal: Negative.    Skin: Negative.    Allergic/Immunologic: Negative.    Neurological: Negative.    Hematological: Negative.    Psychiatric/Behavioral: Negative.                                    Objective    There were no vitals filed for this visit.  No LMP recorded. (Menstrual status: IUD).    Physical Exam  Constitutional:       Comments: Patient A/O x4, LOC 5, calm and cooperative. Patient speaking in complete sentences with SOB or difficulty. Patient following commands appropriately. Patient subsequently presented to office to leave urine samples and was in no acute distress.    HENT:      Head: Normocephalic and atraumatic.      Nose: Nose normal.   Eyes:      Extraocular Movements: Extraocular movements intact.      Conjunctiva/sclera: Conjunctivae normal.      Pupils: Pupils are equal, round, and reactive to light.   Cardiovascular:      Rate and Rhythm: Normal rate and regular rhythm.      Pulses: Normal pulses.      Heart sounds: Normal heart sounds.   Pulmonary:      Effort: Pulmonary effort is normal.      Breath sounds: Normal breath sounds.   Abdominal:      General: Abdomen is flat.      Palpations: Abdomen is soft.   Genitourinary:     Comments: Negative CV tenderness to percussion bilaterally. UA +3 hematuria (patient spotting); remainder unremarkable. Negative urine HCG  Musculoskeletal:         General: Normal range of motion.      Cervical back: Neck supple.   Skin:     General: Skin is warm and dry.      Capillary Refill: Capillary refill takes less than 2 seconds.   Neurological:      General: No focal deficit present.      Mental Status: She is oriented to person, place, and time.   Psychiatric:         Mood and  Affect: Mood normal.         Behavior: Behavior normal.         Procedures    Point of Care Test & Imaging Results from this visit  Results for orders placed or performed in visit on 03/07/25   POCT UA Automated manually resulted   Result Value Ref Range    POC Color, Urine Yellow Straw, Yellow, Light-Yellow    POC Appearance, Urine Clear Clear    POC Glucose, Urine NEGATIVE NEGATIVE mg/dl    POC Bilirubin, Urine NEGATIVE NEGATIVE    POC Ketones, Urine NEGATIVE NEGATIVE mg/dl    POC Specific Gravity, Urine 1.010 1.005 - 1.035    POC Blood, Urine LARGE (3+) (A) NEGATIVE    POC PH, Urine 7.0 No Reference Range Established PH    POC Protein, Urine NEGATIVE NEGATIVE mg/dl    POC Urobilinogen, Urine 0.2 0.2, 1.0 EU/DL    Poc Nitrite, Urine NEGATIVE NEGATIVE    POC Leukocytes, Urine NEGATIVE NEGATIVE   POCT pregnancy, urine manually resulted   Result Value Ref Range    Preg Test, Ur Negative Negative      No results found.    Diagnostic study results (if any) were reviewed by STEPHANIE Rubio.    Assessment/Plan   Allergies, medications, history, and pertinent labs/EKGs/Imaging reviewed by STEPHANIE Rubio.     Medical Decision Making  -Patient c/o urinary frequency, burning, vaginal irritation x2 weeks. Patient completed course of Macrobid x2 weeks prior for suspected UTI however, results indicated mixed bacteria  -Patient reports symptoms appear to return the past two days  -Requesting testing for UTI, BV/Yeast; will send labs and treat if indicated  -Patient presented to office; UA unremarkable other than 3+ hematuria (patient is spotting). Negative urine HCG  -Will send Rx of Diflucan for suspected vaginal candida as patient reports frequent h/o candida overgrowth diagnosed by functional medicine MD  -Please follow-up with PCP/OBGYN at scheduled appt in May if symptoms persist/return    I have communicated my name and active licensure information to the patient. The patient's identity and physical location  were verified at the time of this visit. Either the patient or their legal representative were informed of the risks and benefits of, and alternatives to, treatment through a remote evaluation. The patient consented to proceed with the evaluation remotely. This remote visit was conducted using video and audio.     At time of discharge, patient was clinically well-appearing and appropriate for outpatient management. The patient/parent/guardian was educated regarding diagnosis, supportive care, OTC and Rx medications. The patient/parent/guardian was given the opportunity to ask questions prior to discharge. They verbalized understanding of discussion of treatment plan, expected course of illness and/or injury, indications on when to return to , when to seek further evaluation in ED/call 911, and the need to follow up with PCP and/or specialist as referred. Patient/parent/guardian was provided with work/school documentation if requested. Patient stable upon discharge.     Orders and Diagnoses  Diagnoses and all orders for this visit:  Vaginal irritation  -     Vaginitis Gram Stain For Bacterial Vaginosis + Yeast  -     POCT pregnancy, urine manually resulted  -     fluconazole (Diflucan) 150 mg tablet; Take 1 tablet (150mg) by mouth x1 dose for yeast infection. May repeat dose in 3 days if symptoms persist  Dysuria  -     POCT UA Automated manually resulted  -     Urine Culture  -     POCT pregnancy, urine manually resulted      Medical Admin Record      Patient disposition: Home/In-clinic at [place] office    Electronically signed by STEPHANIE Rubio  4:55 PM      Provider Location: [place] [facility]    Video visit completed with realtime synchronous video/audio connection. Informed consent was obtained from the patient. Patient was made aware that my evaluation and diagnosis are limited due to the fact that we are not in the same room during the interview and that this is a virtual encounter that took place  via videoconferencing. Patient verbalized understanding.     Patient disposition: Home    Electronically signed by STEPHANIE Rubio  4:55 PM

## 2025-03-08 DIAGNOSIS — F41.1 GENERALIZED ANXIETY DISORDER: ICD-10-CM

## 2025-03-08 RX ORDER — BUSPIRONE HYDROCHLORIDE 10 MG/1
10 TABLET ORAL 2 TIMES DAILY
Qty: 60 TABLET | Refills: 1 | Status: SHIPPED | OUTPATIENT
Start: 2025-03-08 | End: 2025-05-07

## 2025-03-09 LAB
BACTERIA UR CULT: NORMAL
BV SCORE VAG QL: NORMAL

## 2025-03-10 ENCOUNTER — APPOINTMENT (OUTPATIENT)
Dept: RADIOLOGY | Facility: CLINIC | Age: 35
End: 2025-03-10
Payer: COMMERCIAL

## 2025-03-11 ENCOUNTER — APPOINTMENT (OUTPATIENT)
Dept: INTEGRATIVE MEDICINE | Facility: CLINIC | Age: 35
End: 2025-03-11
Payer: COMMERCIAL

## 2025-03-11 ENCOUNTER — TREATMENT (OUTPATIENT)
Dept: PHYSICAL THERAPY | Facility: CLINIC | Age: 35
End: 2025-03-11
Payer: COMMERCIAL

## 2025-03-11 DIAGNOSIS — G89.29 CHRONIC LOW BACK PAIN WITH BILATERAL SCIATICA, UNSPECIFIED BACK PAIN LATERALITY: Primary | ICD-10-CM

## 2025-03-11 DIAGNOSIS — M54.41 CHRONIC LOW BACK PAIN WITH BILATERAL SCIATICA, UNSPECIFIED BACK PAIN LATERALITY: Primary | ICD-10-CM

## 2025-03-11 DIAGNOSIS — M54.42 CHRONIC LOW BACK PAIN WITH BILATERAL SCIATICA, UNSPECIFIED BACK PAIN LATERALITY: Primary | ICD-10-CM

## 2025-03-11 DIAGNOSIS — Q67.5 CONGENITAL SCOLIOSIS: ICD-10-CM

## 2025-03-11 NOTE — PROGRESS NOTES
"Physical Therapy                 Therapy Communication Note    Patient Name: Sasha Longoria  MRN: [id number]  Department: [facility]  Today's Date: 3/11/2025     Discipline: Physical Therapy    Missed Visit Reason: per Glent \"Haven't had enough sessions to warrant recheck. Need to rebook several appointments\"    Missed Time: Cancel    "

## 2025-03-12 ENCOUNTER — APPOINTMENT (OUTPATIENT)
Dept: NEUROSURGERY | Facility: CLINIC | Age: 35
End: 2025-03-12
Payer: COMMERCIAL

## 2025-03-13 ENCOUNTER — APPOINTMENT (OUTPATIENT)
Dept: PRIMARY CARE | Facility: CLINIC | Age: 35
End: 2025-03-13
Payer: COMMERCIAL

## 2025-03-13 ENCOUNTER — APPOINTMENT (OUTPATIENT)
Dept: PHYSICAL THERAPY | Facility: CLINIC | Age: 35
End: 2025-03-13
Payer: COMMERCIAL

## 2025-03-14 LAB
17OHP SERPL-MCNC: 48 NG/DL
PROLACTIN SERPL-MCNC: 20.4 NG/ML
TESTOST FREE SERPL-MCNC: 6.1 PG/ML (ref 0.1–6.4)
TESTOST SERPL-MCNC: 32 NG/DL (ref 2–45)

## 2025-03-18 ENCOUNTER — APPOINTMENT (OUTPATIENT)
Dept: NEUROLOGY | Facility: CLINIC | Age: 35
End: 2025-03-18
Payer: COMMERCIAL

## 2025-03-19 ASSESSMENT — ENCOUNTER SYMPTOMS
DYSURIA: 0
NAUSEA: 0
FEVER: 0
VOMITING: 0
SHORTNESS OF BREATH: 0
DIFFICULTY URINATING: 0
FATIGUE: 1
AGITATION: 0
DIARRHEA: 0
COLOR CHANGE: 0
DIZZINESS: 0

## 2025-03-19 NOTE — PROGRESS NOTES
Assessment/Plan   Today's presentation is consistent with fibromyalgia/myofascial pain syndrome alongside postural strain and somatic dysfunction contributing to her pain syndrome, also has been diagnosed with inflammatory arthritis (not rheumatoid), PCOS, and has had elevated ESR/CRP and been under rheumatologic care.  Complicating factors include chronicity of symptoms as well as body habitus.  We will implement a trial of care to include various manipulative techniques which will range from instrument assisted to diversified.  We will utilize soft tissue techniques such as active release technique to the cervical spine musculature.  We will closely monitor their response to care to determine if any changes need to occur, if advanced imaging is needed, or if referral to other providers is appropriate. She will also be receiving acupuncture and integrative medicine consultation which is appropriate when considering her overall presentation     Full spine EOS radiographs 2024 - 12 degree dextroconvex scoliosis from T11 to L3. Hyperkyphosis in TS and hyperlordosis in LS    Brain MRI 2024 - IMPRESSION:  1. No MR evidence of acute intracranial infarct, hemorrhage, mass, or  mass effect.  2. Nonspecific 6 mm white matter FLAIR signal hyperintensity adjacent  to the trigone of the right lateral ventricle, which may be  indicative of minimal infectious inflammatory change, migraine  disorder, demyelinating disease, or vasculitis for instance in the  appropriate clinical context. Consider follow-up exam if clinically  indicated.    LS radiographs 2024 - IMPRESSION:  No acute pathologic findings are identified.    CS radiographs 2024 - IMPRESSION:  No pathologic findings are identified.    TS radiographs 2024 - IMPRESSION:  No acute pathologic findings are identified.  Thoracic lumbar dextroscoliosis.  Lower thoracic mild spondylosis.    Visits this year: 4 (under new insurance)    Subjective   Patient reports frequent  moderate to severe upper back mid and lower back pain and stiffness.  Had a flareup last week that required a trip to urgent care.  Working with psychiatry to taper off a couple of medication she is taking that could be problematic.  She is also under a lot of stress as her cat had cancer required surgery and this was a large cost.  She was unable to get to our office and she also had an infection she has recovered from.  Endorses slight headache.  No radiating pain into the extremities at the moment    HPI - 10/05/23 (CR): the patient presents today per the referral of Dr. Suárez and pain management for evaluation and and management of chronic full spine complaints. She has in the past been diagnosed with fibromyalgia. She has dealt with pain for several years and has received chiropractic care in the past. She did have mixed results with chiropractic care in the past, reporting a variation of instrument assisted manipulation and manual manipulation. But she wished to attend chiropractic care again through a hospital-based provider. She is under the care of pain management and will be starting infusions next week. She is also on the wait list for infusions at [facility] if needed. Overall, her pain levels remain moderate to severe. Her pain is constant. Symptoms in the lower back and hips seem to be the area of most intensity. But she does continue to experience pain throughout the spine. She experiences paresthesias in the upper and lower extremities bilaterally. She has difficulty finding any comfortable positions     Review of Systems   Constitutional:  Positive for fatigue. Negative for fever.   Eyes:  Negative for visual disturbance.   Respiratory:  Negative for shortness of breath.    Cardiovascular:  Negative for chest pain.   Gastrointestinal:  Negative for diarrhea, nausea and vomiting.   Genitourinary:  Negative for difficulty urinating and dysuria.   Skin:  Negative for color change.    Neurological:  Negative for dizziness.   Psychiatric/Behavioral:  Negative for agitation.    All other systems reviewed and are negative.    Objective   Examination findings (e.g., palpation & ROM): Decreased C/S and L/S ROM with pain, hypertonic and tender cervical and lumbar erectors, BL upper trapezius  SLR BL 65    Segmental joint dysfunction was identified in the following areas using motion palpation and/or pain provocation assessment:  Cervical: 2  Thoracic: 7-9  Lumbopelvic: 1-3, BL SIJ      Physical Exam  Neurological:      General: No focal deficit present.      Mental Status: She is alert.      Motor: Motor function is intact.      Coordination: Coordination is intact.      Gait: Gait is intact.      Deep Tendon Reflexes: Reflexes are normal and symmetric.      Reflex Scores:       Tricep reflexes are 2+ on the right side and 2+ on the left side.       Bicep reflexes are 2+ on the right side and 2+ on the left side.       Brachioradialis reflexes are 2+ on the right side and 2+ on the left side.       Patellar reflexes are 2+ on the right side and 2+ on the left side.       Achilles reflexes are 2+ on the right side and 2+ on the left side.     Comments: Trace Jaylin's BL  9/3/24       Plan   Today's treatment:  SMT to regions of segmental dysfunction identified on exam, using age-appropriate force, and manual diversified technique.   Mobilization on CS  STM to patient tolerance to hypertonic paraspinal muscles, upper trapezius BL  Manual dynamic stretching of the bl gluteal mm & hamstrings  1:21 to 1:38 PM  Patient noted improved mobility and reduced pain post-treatment    Treatment Plan:   The patient and I discussed the risks and benefits of chiropractic care. Based on the patient's subjective complaints along with the examination findings, it is advised that a course of chiropractic treatment be initiated. The patient provided consent for care. The patient tolerated today's treatment with little or  no additional discomfort and was instructed to contact the office for questions or concerns. Will see patient once per week then every 2 weeks when symptoms become mild/manageable, further spaced apart contingent upon improvement.     This chart note was generated using dictation software, and as such, there may be typographical errors present. Abbreviations: Cervical spine (CS), cervical-thoracic (CT), Dry needling (DN), Flexion adduction internal rotation (FAIR), high velocity, low amplitude (HVLA), Lumbar spine (LS), Soft tissue manipulation (STM), spinal manipulative therapy (SMT), Straight leg raise (SLR), Thoracic spine (TS).

## 2025-03-20 ENCOUNTER — APPOINTMENT (OUTPATIENT)
Dept: INTEGRATIVE MEDICINE | Facility: CLINIC | Age: 35
End: 2025-03-20
Payer: COMMERCIAL

## 2025-03-20 DIAGNOSIS — M99.02 SOMATIC DYSFUNCTION OF THORACIC REGION: Primary | ICD-10-CM

## 2025-03-20 DIAGNOSIS — G89.29 CHRONIC LOW BACK PAIN WITH SCIATICA, SCIATICA LATERALITY UNSPECIFIED, UNSPECIFIED BACK PAIN LATERALITY: ICD-10-CM

## 2025-03-20 DIAGNOSIS — F43.10 PTSD (POST-TRAUMATIC STRESS DISORDER): ICD-10-CM

## 2025-03-20 DIAGNOSIS — F41.1 GENERALIZED ANXIETY DISORDER: ICD-10-CM

## 2025-03-20 DIAGNOSIS — M54.40 CHRONIC LOW BACK PAIN WITH SCIATICA, SCIATICA LATERALITY UNSPECIFIED, UNSPECIFIED BACK PAIN LATERALITY: ICD-10-CM

## 2025-03-20 DIAGNOSIS — M54.2 NECK PAIN: ICD-10-CM

## 2025-03-20 DIAGNOSIS — R53.82 CHRONIC FATIGUE: ICD-10-CM

## 2025-03-20 DIAGNOSIS — M99.03 SOMATIC DYSFUNCTION OF LUMBAR REGION: ICD-10-CM

## 2025-03-20 DIAGNOSIS — M79.10 MYALGIA: ICD-10-CM

## 2025-03-20 DIAGNOSIS — M99.05 SOMATIC DYSFUNCTION OF PELVIS REGION: ICD-10-CM

## 2025-03-20 RX ORDER — DULOXETIN HYDROCHLORIDE 30 MG/1
30 CAPSULE, DELAYED RELEASE ORAL DAILY
Qty: 14 CAPSULE | Refills: 0 | Status: SHIPPED | OUTPATIENT
Start: 2025-03-20 | End: 2025-04-03

## 2025-03-20 RX ORDER — DULOXETIN HYDROCHLORIDE 30 MG/1
30 CAPSULE, DELAYED RELEASE ORAL DAILY
Qty: 14 CAPSULE | Refills: 0 | Status: SHIPPED | OUTPATIENT
Start: 2025-03-20 | End: 2025-03-20

## 2025-03-20 NOTE — PROGRESS NOTES
Patient to be weaned off Wellbutrin and Cymbalta given Significant Gene-drug Interaction by [employer] assessment.  Will start patient on Viibryd and modafinil during next encounter on 4/2/2025.   UDS to be completed by patient prior

## 2025-03-21 ENCOUNTER — INFUSION (OUTPATIENT)
Dept: INFUSION THERAPY | Facility: CLINIC | Age: 35
End: 2025-03-21
Payer: COMMERCIAL

## 2025-03-21 VITALS
TEMPERATURE: 98.1 F | OXYGEN SATURATION: 98 % | DIASTOLIC BLOOD PRESSURE: 75 MMHG | HEART RATE: 89 BPM | SYSTOLIC BLOOD PRESSURE: 134 MMHG | RESPIRATION RATE: 18 BRPM

## 2025-03-21 DIAGNOSIS — M79.7 FIBROMYALGIA: Primary | ICD-10-CM

## 2025-03-21 LAB
AMPHETAMINES UR QL: NEGATIVE NG/ML
BARBITURATES UR QL: NEGATIVE NG/ML
BENZODIAZ UR QL: NEGATIVE NG/ML
BZE UR QL: NEGATIVE NG/ML
CREAT UR-MCNC: 238.5 MG/DL
METHADONE UR QL: NEGATIVE NG/ML
OPIATES UR QL: NEGATIVE NG/ML
OXIDANTS UR QL: NEGATIVE MCG/ML
OXYCODONE UR QL: NEGATIVE NG/ML
PCP UR QL: NEGATIVE NG/ML
PH UR: 6.3 [PH] (ref 4.5–9)
PREGNANCY TEST URINE, POC: NEGATIVE
QUEST NOTES AND COMMENTS: NORMAL
THC UR QL: NEGATIVE NG/ML

## 2025-03-21 PROCEDURE — 96368 THER/DIAG CONCURRENT INF: CPT | Mod: INF

## 2025-03-21 PROCEDURE — 81025 URINE PREGNANCY TEST: CPT

## 2025-03-21 PROCEDURE — 96365 THER/PROPH/DIAG IV INF INIT: CPT | Mod: INF

## 2025-03-21 PROCEDURE — 2500000004 HC RX 250 GENERAL PHARMACY W/ HCPCS (ALT 636 FOR OP/ED): Performed by: NURSE PRACTITIONER

## 2025-03-21 PROCEDURE — 96375 TX/PRO/DX INJ NEW DRUG ADDON: CPT | Mod: INF

## 2025-03-21 RX ORDER — METOPROLOL TARTRATE 25 MG/1
25 TABLET, FILM COATED ORAL ONCE
OUTPATIENT
Start: 2025-04-04 | End: 2025-04-04

## 2025-03-21 RX ORDER — EPINEPHRINE 0.3 MG/.3ML
0.3 INJECTION SUBCUTANEOUS EVERY 5 MIN PRN
OUTPATIENT
Start: 2025-04-04

## 2025-03-21 RX ORDER — NITROGLYCERIN 0.4 MG/1
0.4 TABLET SUBLINGUAL ONCE
OUTPATIENT
Start: 2025-04-04 | End: 2025-04-04

## 2025-03-21 RX ORDER — DIPHENHYDRAMINE HYDROCHLORIDE 50 MG/ML
50 INJECTION, SOLUTION INTRAMUSCULAR; INTRAVENOUS AS NEEDED
OUTPATIENT
Start: 2025-04-04

## 2025-03-21 RX ORDER — HEPARIN 100 UNIT/ML
5 SYRINGE INTRAVENOUS AS NEEDED
OUTPATIENT
Start: 2025-04-04

## 2025-03-21 RX ORDER — KETAMINE HCL IN NACL, ISO-OSM 100MG/10ML
SYRINGE (ML) INJECTION ONCE
Status: COMPLETED | OUTPATIENT
Start: 2025-03-21 | End: 2025-03-21

## 2025-03-21 RX ORDER — KETAMINE HCL IN NACL, ISO-OSM 100MG/10ML
SYRINGE (ML) INJECTION ONCE
OUTPATIENT
Start: 2025-04-04

## 2025-03-21 RX ORDER — KETOROLAC TROMETHAMINE 30 MG/ML
30 INJECTION, SOLUTION INTRAMUSCULAR; INTRAVENOUS ONCE
OUTPATIENT
Start: 2025-04-04 | End: 2025-04-04

## 2025-03-21 RX ORDER — DIPHENHYDRAMINE HYDROCHLORIDE 50 MG/ML
25 INJECTION, SOLUTION INTRAMUSCULAR; INTRAVENOUS ONCE
OUTPATIENT
Start: 2025-04-04 | End: 2025-04-04

## 2025-03-21 RX ORDER — KETOROLAC TROMETHAMINE 30 MG/ML
30 INJECTION, SOLUTION INTRAMUSCULAR; INTRAVENOUS ONCE
Status: COMPLETED | OUTPATIENT
Start: 2025-03-21 | End: 2025-03-21

## 2025-03-21 RX ORDER — FAMOTIDINE 10 MG/ML
20 INJECTION, SOLUTION INTRAVENOUS ONCE AS NEEDED
OUTPATIENT
Start: 2025-04-04

## 2025-03-21 RX ORDER — ALBUTEROL SULFATE 0.83 MG/ML
3 SOLUTION RESPIRATORY (INHALATION) AS NEEDED
OUTPATIENT
Start: 2025-04-04

## 2025-03-21 RX ORDER — METOCLOPRAMIDE HYDROCHLORIDE 5 MG/ML
10 INJECTION INTRAMUSCULAR; INTRAVENOUS ONCE
OUTPATIENT
Start: 2025-04-04 | End: 2025-04-04

## 2025-03-21 RX ORDER — HEPARIN 100 UNIT/ML
5 SYRINGE INTRAVENOUS AS NEEDED
Status: DISCONTINUED | OUTPATIENT
Start: 2025-03-21 | End: 2025-03-21 | Stop reason: HOSPADM

## 2025-03-21 RX ORDER — ONDANSETRON HYDROCHLORIDE 2 MG/ML
4 INJECTION, SOLUTION INTRAVENOUS ONCE
OUTPATIENT
Start: 2025-04-04 | End: 2025-04-04

## 2025-03-21 RX ADMIN — PROPOFOL 100 MG: 10 INJECTION, EMULSION INTRAVENOUS at 09:20

## 2025-03-21 RX ADMIN — Medication: at 09:21

## 2025-03-21 RX ADMIN — HEPARIN 500 UNITS: 100 SYRINGE at 09:59

## 2025-03-21 RX ADMIN — KETOROLAC TROMETHAMINE 30 MG: 30 INJECTION, SOLUTION INTRAMUSCULAR at 09:18

## 2025-03-21 ASSESSMENT — PAIN DESCRIPTION - DESCRIPTORS: DESCRIPTORS: ACHING;SORE

## 2025-03-21 ASSESSMENT — ENCOUNTER SYMPTOMS
DEPRESSION: 1
LOSS OF SENSATION IN FEET: 1
OCCASIONAL FEELINGS OF UNSTEADINESS: 0

## 2025-03-21 ASSESSMENT — PAIN SCALES - GENERAL
PAINLEVEL_OUTOF10: 3
PAINLEVEL_OUTOF10: 7

## 2025-03-21 ASSESSMENT — PAIN - FUNCTIONAL ASSESSMENT
PAIN_FUNCTIONAL_ASSESSMENT: 0-10
PAIN_FUNCTIONAL_ASSESSMENT: 0-10

## 2025-03-21 NOTE — PATIENT INSTRUCTIONS
Today :We administered heparin flush, ketamine 30 mg-lidocaine 300 mg, propofol (Diprivan), and ketorolac.     For:   1. Fibromyalgia               Please read the  Medication Guide that was given to you and reviewed during todays visit.     (Tell all doctors including dentists that you are taking this medication)     Go to the emergency room or call 911 if:  -You have signs of allergic reaction:   -Rash, hives, itching.   -Swollen, blistered, peeling skin.   -Swelling of face, lips, mouth, tongue or throat.   -Tightness of chest, trouble breathing, swallowing or talking     Call your doctor:  - If IV / injection site gets red, warm, swollen, itchy or leaks fluid or pus.     (Leave dressing on your IV site for at least 2 hours and keep area clean and dry  - If you get sick or have symptoms of infection or are not feeling well for any reason.    (Wash your hands often, stay away from people who are sick)  - If you have side effects from your medication that do not go away or are bothersome.     (Refer to the teaching your nurse gave you for side effects to call your doctor about)    - Common side effects may include:  stuffy nose, headache, feeling tired, muscle aches, upset stomach  - Before receiving any vaccines     - Call the [facility] at   If:  - You get sick, are on antibiotics, have had a recent vaccine, have surgery or dental work and your doctor wants your visit rescheduled.  - You need to cancel and reschedule your visit for any reason. Call at least 2 days before your visit if you need to cancel.   - Your insurance changes before your next visit.    (We will need to get approval from your new insurance. This can take up to two weeks.)     The [facility] is opened Monday thru Friday. We are closed on weekends and holidays.   Voice mail will take your call if the center is closed. If you leave a message please allow 24 hours for a call back during weekdays. If you leave a  message on a weekend/holiday, we will call you back the next business day.    A pharmacist is available Monday - Friday from 8:30AM to 3:30PM to help answer any questions you may have about your prescriptions(s). Please call pharmacy at:    [facility]: [phone]  [facility]: [phone]  [facility]: [phone]              [facility]      Pain Infusion Aftercare Instructions      1. It is normal to feel sedated, tired and low in energy after a pain infusion. DO NOT DRIVE, OPERATE ANY MACHINERY, OR MAKE ANY IMPORTANT DECISIONS FOR AT LEAST 24 HOURS AFTER THE INFUSION.     2. Call the pain center at [phone] with any problems, questions, or concerns.     3. Eat light after the infusion. If you feel queasy or sick to your stomach, laying down with your eyes closed may help. When you resume eating start with something mild like clear liquids, yogurt, applesauce, crackers, etc… Gradually advance to a regular diet.     4. Do not leave your house alone the evening of your pain infusion.     5. No alcohol or sedative medications, such as sleeping pills, for 24 hours after your pain infusion.     6. Resume all other prescribed medications unless directed otherwise by you physician.     7. If you have any medical emergencies, call 911 or go directly to the closest emergency room.

## 2025-03-21 NOTE — PROGRESS NOTES
S: Patient here for 30 opioid sparing pain infusion. Patient reports 65-75% reduction in pain after last infusion that lasted 1.5 weeks.    Purpose of pain infusion meds explained along with potential side effects.  Patient verbalized understanding.    B: Pain Issues 7/10 generalized-focussed in right leg and head. Is Patient breast feeding: ?    A: Patient currently has pain described on flow sheet documentation. Designated  is [employer] @ [phone]. Patient last ate solid food 12 hours ago, and had liquid 1 hours ago.    R: Plan; Obtain IV access, do patient risk assessment, and start opioid sparing infusion as ordered. Monitoring for S/S of adverse reactions.      Post infusion teaching provided. Patient verbalized understanding. VSS, Patient states pain is 3/10. Will assist patient to waiting car via wheelchair.

## 2025-03-25 ENCOUNTER — APPOINTMENT (OUTPATIENT)
Dept: INTEGRATIVE MEDICINE | Facility: CLINIC | Age: 35
End: 2025-03-25
Payer: COMMERCIAL

## 2025-03-25 ENCOUNTER — PATIENT MESSAGE (OUTPATIENT)
Dept: INTEGRATIVE MEDICINE | Facility: CLINIC | Age: 35
End: 2025-03-25

## 2025-03-25 ENCOUNTER — APPOINTMENT (OUTPATIENT)
Dept: PAIN MEDICINE | Facility: CLINIC | Age: 35
End: 2025-03-25
Payer: COMMERCIAL

## 2025-03-26 ENCOUNTER — SPECIALTY PHARMACY (OUTPATIENT)
Dept: PHARMACY | Facility: CLINIC | Age: 35
End: 2025-03-26

## 2025-03-26 PROCEDURE — RXMED WILLOW AMBULATORY MEDICATION CHARGE

## 2025-03-27 ENCOUNTER — APPOINTMENT (OUTPATIENT)
Dept: INTEGRATIVE MEDICINE | Facility: CLINIC | Age: 35
End: 2025-03-27
Payer: COMMERCIAL

## 2025-03-27 ENCOUNTER — PHARMACY VISIT (OUTPATIENT)
Dept: PHARMACY | Facility: CLINIC | Age: 35
End: 2025-03-27
Payer: COMMERCIAL

## 2025-03-28 ENCOUNTER — HOSPITAL ENCOUNTER (OUTPATIENT)
Dept: RADIOLOGY | Facility: CLINIC | Age: 35
End: 2025-03-28
Payer: COMMERCIAL

## 2025-03-28 RX ORDER — IMIQUIMOD 12.5 MG/.25G
CREAM TOPICAL
COMMUNITY
Start: 2025-03-12

## 2025-03-30 DIAGNOSIS — F41.1 GENERALIZED ANXIETY DISORDER: ICD-10-CM

## 2025-03-31 RX ORDER — BUSPIRONE HYDROCHLORIDE 10 MG/1
10 TABLET ORAL 2 TIMES DAILY
Qty: 180 TABLET | Refills: 1 | Status: SHIPPED | OUTPATIENT
Start: 2025-03-31

## 2025-03-31 ASSESSMENT — ENCOUNTER SYMPTOMS
VOMITING: 0
DIFFICULTY URINATING: 0
COLOR CHANGE: 0
DIARRHEA: 0
FATIGUE: 1
AGITATION: 0
DIZZINESS: 0
NAUSEA: 0
FEVER: 0
DYSURIA: 0
SHORTNESS OF BREATH: 0

## 2025-03-31 NOTE — PROGRESS NOTES
Assessment/Plan   Today's presentation is consistent with fibromyalgia/myofascial pain syndrome alongside postural strain and somatic dysfunction contributing to her pain syndrome, also has been diagnosed with inflammatory arthritis (not rheumatoid), PCOS, and has had elevated ESR/CRP and been under rheumatologic care.  Complicating factors include chronicity of symptoms as well as body habitus.  We will implement a trial of care to include various manipulative techniques which will range from instrument assisted to diversified.  We will utilize soft tissue techniques such as active release technique to the cervical spine musculature.  We will closely monitor their response to care to determine if any changes need to occur, if advanced imaging is needed, or if referral to other providers is appropriate. She will also be receiving acupuncture and integrative medicine consultation which is appropriate when considering her overall presentation     Full spine EOS radiographs 2024 - 12 degree dextroconvex scoliosis from T11 to L3. Hyperkyphosis in TS and hyperlordosis in LS    Brain MRI 2024 - IMPRESSION:  1. No MR evidence of acute intracranial infarct, hemorrhage, mass, or  mass effect.  2. Nonspecific 6 mm white matter FLAIR signal hyperintensity adjacent  to the trigone of the right lateral ventricle, which may be  indicative of minimal infectious inflammatory change, migraine  disorder, demyelinating disease, or vasculitis for instance in the  appropriate clinical context. Consider follow-up exam if clinically  indicated.    LS radiographs 2024 - IMPRESSION:  No acute pathologic findings are identified.    CS radiographs 2024 - IMPRESSION:  No pathologic findings are identified.    TS radiographs 2024 - IMPRESSION:  No acute pathologic findings are identified.  Thoracic lumbar dextroscoliosis.  Lower thoracic mild spondylosis.    Visits this year: 5 (under new insurance)    Subjective   Patient reports mild diffuse  headache and general feels achy and sore and stiff all over including the muscles and joints of the axial and peripheral skeleton.  Chief complaint at the moment is moderate to severe thoracolumbar pain and tightness as well as moderate neck pain and tightness.  He is tapering and/or discontinuing off medications under supervision of her other clinicians including anti-inflammatory that she was taking for years.  She is also under stress and very busy with work.  No specific trauma injury or radiating pain numbness or tingling in extremities    HPI - 10/05/23 (CR): the patient presents today per the referral of Dr. Suárez and pain management for evaluation and and management of chronic full spine complaints. She has in the past been diagnosed with fibromyalgia. She has dealt with pain for several years and has received chiropractic care in the past. She did have mixed results with chiropractic care in the past, reporting a variation of instrument assisted manipulation and manual manipulation. But she wished to attend chiropractic care again through a hospital-based provider. She is under the care of pain management and will be starting infusions next week. She is also on the wait list for infusions at [facility] if needed. Overall, her pain levels remain moderate to severe. Her pain is constant. Symptoms in the lower back and hips seem to be the area of most intensity. But she does continue to experience pain throughout the spine. She experiences paresthesias in the upper and lower extremities bilaterally. She has difficulty finding any comfortable positions     Review of Systems   Constitutional:  Positive for fatigue. Negative for fever.   Eyes:  Negative for visual disturbance.   Respiratory:  Negative for shortness of breath.    Cardiovascular:  Negative for chest pain.   Gastrointestinal:  Negative for diarrhea, nausea and vomiting.   Genitourinary:  Negative for difficulty urinating and dysuria.   Skin:   Negative for color change.   Neurological:  Negative for dizziness.   Psychiatric/Behavioral:  Negative for agitation.    All other systems reviewed and are negative.    Objective   Examination findings (e.g., palpation & ROM): Decreased C/S and L/S ROM with pain, hypertonic and tender cervical and lumbar erectors, BL upper trapezius  SLR BL 65    Segmental joint dysfunction was identified in the following areas using motion palpation and/or pain provocation assessment:  Cervical: 2  Thoracic: 7-9  Lumbopelvic: 1-3, BL SIJ      Physical Exam  Neurological:      General: No focal deficit present.      Mental Status: She is alert.      Motor: Motor function is intact.      Coordination: Coordination is intact.      Gait: Gait is intact.      Deep Tendon Reflexes: Reflexes are normal and symmetric.      Reflex Scores:       Tricep reflexes are 2+ on the right side and 2+ on the left side.       Bicep reflexes are 2+ on the right side and 2+ on the left side.       Brachioradialis reflexes are 2+ on the right side and 2+ on the left side.       Patellar reflexes are 2+ on the right side and 2+ on the left side.       Achilles reflexes are 2+ on the right side and 2+ on the left side.     Comments: Trace Jaylin's BL  9/3/24       Plan   Today's treatment:  SMT to regions of segmental dysfunction identified on exam, using age-appropriate force, and manual diversified technique.   Mobilization on CS  STM to patient tolerance to hypertonic paraspinal muscles, upper trapezius BL  Manual dynamic stretching of the bl gluteal mm & hamstrings  1:45 to 2 PM  Patient noted improved mobility and reduced pain post-treatment    Treatment Plan:   The patient and I discussed the risks and benefits of chiropractic care. Based on the patient's subjective complaints along with the examination findings, it is advised that a course of chiropractic treatment be initiated. The patient provided consent for care. The patient tolerated today's  treatment with little or no additional discomfort and was instructed to contact the office for questions or concerns. Will see patient once per week then every 2 weeks when symptoms become mild/manageable, further spaced apart contingent upon improvement.     This chart note was generated using dictation software, and as such, there may be typographical errors present. Abbreviations: Cervical spine (CS), cervical-thoracic (CT), Dry needling (DN), Flexion adduction internal rotation (FAIR), high velocity, low amplitude (HVLA), Lumbar spine (LS), Soft tissue manipulation (STM), spinal manipulative therapy (SMT), Straight leg raise (SLR), Thoracic spine (TS).

## 2025-04-01 ENCOUNTER — TELEPHONE (OUTPATIENT)
Dept: HEMATOLOGY/ONCOLOGY | Facility: CLINIC | Age: 35
End: 2025-04-01

## 2025-04-01 ENCOUNTER — APPOINTMENT (OUTPATIENT)
Dept: INTEGRATIVE MEDICINE | Facility: CLINIC | Age: 35
End: 2025-04-01
Payer: COMMERCIAL

## 2025-04-01 DIAGNOSIS — G89.29 CHRONIC LOW BACK PAIN WITH SCIATICA, SCIATICA LATERALITY UNSPECIFIED, UNSPECIFIED BACK PAIN LATERALITY: ICD-10-CM

## 2025-04-01 DIAGNOSIS — M99.02 SOMATIC DYSFUNCTION OF THORACIC REGION: Primary | ICD-10-CM

## 2025-04-01 DIAGNOSIS — M54.40 CHRONIC LOW BACK PAIN WITH SCIATICA, SCIATICA LATERALITY UNSPECIFIED, UNSPECIFIED BACK PAIN LATERALITY: ICD-10-CM

## 2025-04-01 DIAGNOSIS — M99.03 SOMATIC DYSFUNCTION OF LUMBAR REGION: ICD-10-CM

## 2025-04-01 DIAGNOSIS — M99.05 SOMATIC DYSFUNCTION OF PELVIS REGION: ICD-10-CM

## 2025-04-01 DIAGNOSIS — M54.2 NECK PAIN: ICD-10-CM

## 2025-04-01 DIAGNOSIS — M79.10 MYALGIA: ICD-10-CM

## 2025-04-01 DIAGNOSIS — M99.01 CERVICAL SEGMENT DYSFUNCTION: ICD-10-CM

## 2025-04-01 NOTE — TELEPHONE ENCOUNTER
I called patient and left a message reminding her of her upcoming appointment on 4-9-25. I gave her our office phone number if she can't make this appointment.

## 2025-04-02 ENCOUNTER — TELEPHONE (OUTPATIENT)
Dept: BEHAVIORAL HEALTH | Facility: CLINIC | Age: 35
End: 2025-04-02

## 2025-04-02 ENCOUNTER — APPOINTMENT (OUTPATIENT)
Dept: BEHAVIORAL HEALTH | Facility: CLINIC | Age: 35
End: 2025-04-02
Payer: COMMERCIAL

## 2025-04-02 DIAGNOSIS — F41.1 GENERALIZED ANXIETY DISORDER: ICD-10-CM

## 2025-04-02 DIAGNOSIS — G47.9 SLEEP DISTURBANCE: ICD-10-CM

## 2025-04-02 DIAGNOSIS — R53.82 CHRONIC FATIGUE: ICD-10-CM

## 2025-04-02 DIAGNOSIS — F43.10 PTSD (POST-TRAUMATIC STRESS DISORDER): ICD-10-CM

## 2025-04-02 DIAGNOSIS — F33.1 MODERATE EPISODE OF RECURRENT MAJOR DEPRESSIVE DISORDER: ICD-10-CM

## 2025-04-02 PROCEDURE — 99214 OFFICE O/P EST MOD 30 MIN: CPT

## 2025-04-02 RX ORDER — MODAFINIL 100 MG/1
100 TABLET ORAL DAILY
Qty: 30 TABLET | Refills: 1 | Status: SHIPPED | OUTPATIENT
Start: 2025-04-02 | End: 2025-06-01

## 2025-04-02 RX ORDER — VILAZODONE HYDROCHLORIDE 20 MG/1
20 TABLET ORAL DAILY
Qty: 30 TABLET | Refills: 1 | Status: SHIPPED | OUTPATIENT
Start: 2025-04-02

## 2025-04-02 NOTE — PROGRESS NOTES
"Sasha Longoria is a 34 y.o. female patient presenting for virtual FUV  Visit location: patient (Sasha, home), provider (STEPHANIE Raphael, virtual office)  Pt identify self by name, , and address    Chief Complaint   Patient presents with    Anxiety    Panic Attack    Depression    PTSD (Post-Traumatic Stress Disorder)    Sleeping Problem     Vivid dreams    Chronic fatigue    ADHD      HPI    2025  States she successfully weaned off Wellbutrin and Cymbalta and instructed following GeneSight test results. States she's also been weaning off other medications which she's on over several years. Reports significant sleep disturbance, only sleeping about 5 hrs/night with frequent disruptions and mostly tired during the day.  States she has tried ZzzQuil, but did precipitate vivid dreams, and melatonin makes her pretty drowsy morning.  Continue to struggle with anxiety, depression, mild inattentiveness, and moderate mood swings.  Denies SI/hallucinations/delusions/shira/hypomania.  Denies panic attacks.    Current S/Sx:  -Mood swings/disorder or bipolar symptoms:   - mood swings - mild    Depression (single, recurrent, seasonal, specific intermittency):   -Other depressive sx/s: Reports ongoing fatigue/motivation/brain fog  -Fatigue/Energy: Low  -Motivation: poor   -Concentration: Reports ongoing \"brain fog\"   -Feeling hope/help/worthless: sometimes feels helpless  -Sleep: sleeps vivid dreams overnight since 2024  -Appetite/Weight Changes: fluctuates appetite, no weight changes  - PHQ-9 (13)    SI/HI  -Reports passive hx of SI without intent/plan. Denies current suicidal intend/plan  -CSSRS Low risks    Weapon safety  -Guns/Weapons at home: denies    Psychosis/schizophrenia  - Psychosis: denies hallucinations/delusions, denies paranoia  - Intrusive thoughts: Denies    Anxiety: (generalized, social, agoraphobia, speaking, specific):   -Worry excessively: present/impactful - especially " about relationships  -NINA-7 (14)    PTSD  - Flashbacks: Denies  -Nightmares: daily vivid dreams, nightmares - common  -Other symptoms: hx of night terrors (continue to occur 1-2 times/month), denies night terrors since last visit    OCD  - Obsessive/compulsive thoughts/acts: not assessed    ADHD  - states she was previously assessed by [facility] and  - found not to have ADHD. Foggy brain, poor concentration/fatigue/low motivation might be caused by over medication  - BAARS-IV Questionnaire: not administered     Panic attacks  Denies panic attacks since last visit     HISTORY  PSYCH HISTORY  -Psych Hx: (dx with anxiety & depression - 2009), cPTSD (2020)/sleep disturbance (2016)  -Psych Hospitalization Hx: denies  -Suicide Attempt Hx: denies  -Self-Harm/Violence Hx: denies  -Current psych meds: Wellbutrin  mg daily (2009/2010) (reduced from 300 mg d/t concerns of side effects of taking alongside other meds), Cymbalta 60 mg daily (reduced from 120 mg d/t side effects of sexual dysfunction related - symptoms are more manageable now).  -Psych Med Hx: Vyvanse 20 (noticed minimal improvement), Strattera (25, 40, 80 mg - minimally beneficial). Have tried Effexor ineffective), Abilify (2009/2010). Stopped Wellbutrin with Abilify around 2010/2011 because therapy was effective. States when she resumed care with psychology (2020) & psychiatry (2021), was placed on Zoloft, became in effective overtime, Wellbutrin was re-added in 2023/2024. Have tried Trazodone (taken for several yrs - inconsistently effective), Mirtazapine (inconsistently helpful) - both ineffective. Was prescribed Prazosin 2 mg (stopped taking because it didn't help much). Just stopped Gabapentin (caused side effects). Weaned off Wellbutrin (2025), Cymbalta (2025) - GeneSight results (severe interaction)     SUBSTANCE USE HISTORY  -Substance Use Hx: denies  -ETOH: rarely  -Tobacco: denies  -Caffeine: coffee  -Substance Abuse  Treatment Hx: denies     FAMILY HISTORY  -Family Psych Hx: all 6 siblings: depression, anxiety. Mom: depression. Father: depression/undx bipolar  -Family Suicide Hx: denies  -Family Substance Abuse Hx: mom's side: struggled with alcoholism, cannabis, narcotics, cocaine, father: abused narcotics, cocaine. Sister: struggled with heroin use     SOCIAL HISTORY  -Upbringing: Grew up with both parents. Has 6 siblings  -income: denies financial struggles  -safety: feels safe at home/generally  -Support system: average support system  -Trauma: Mixed childhood/adulthood, poor memory of possible childhood trauma. Denies physical trauma  -Education: bachelors  -Work: compliance office  -Marital Status: , has current partner  -Children: none  -Living situation: lives alone, partner around 1/2 time/week  -: denies  -Legal: denies      MEDICAL HISTORY  -PCP: DONALD Palmer-CNP  -TBI/head trauma/LOC/seizure hx: mild head injury in 2013, fell down stairs and hit head     REVIEW OF SYSTEMS  Review of Systems   Constitutional:         Fibromyalgia & PCOS, MIKAELA (prescribed CPAP- still finding the right type of mask)   All other systems reviewed and are negative.       PHYSICAL EXAM  Physical Exam  Psychiatric:         Attention and Perception: Attention and perception normal.         Mood and Affect: Mood is anxious and depressed. Affect is flat.         Speech: Speech normal.         Behavior: Behavior normal. Behavior is cooperative.         Thought Content: Thought content normal.         Cognition and Memory: Cognition and memory normal.         Judgment: Judgment normal.          IMPRESSION  Sasha Longoria is a 34 y.o. female patient presents virtual [facility].  Patient completed a GeneSight test which indicates severe interactions with Wellbutrin and Cymbalta, successfully weaned off as instructed.  Continue to struggle with previously assessed symptoms including symptoms relating to anxiety,  depression, inattentiveness, and profound fatigue.  Also reports mood swings.  Denies panic attacks since last visit.  Notes she is going to see a behavioral health sleep specialist to continue to assess because of her frequent vivid dreams.  Denies flashbacks.  Denies SI/hallucinations/delusions/shira/hypomania.  Appetite fluctuates.  Denies substance use.  Discussed to start modafinil as previously assessed to improve daytime fatigue/sleepiness/inattentiveness, Viibryd for anxiety, depression, and PTSD related symptoms.  Will consider Lamictal during subsequent visits to improve mood swings.   Discussed to continue attending weekly counseling.  Will follow up with this provider in 3 weeks to continue monitoring, or sooner if needed.      Plan:     1. Reviewed diagnostic impression including subjective and objective data and provided education about NINA, MDD, PTSD, sleep disturbance, ADHD, panic attacks, bipolar disorder, and substance use/abuse, etiology, treatment recommendations including medication, therapy, course of treatment and prognosis. Patient amenable to treatment plan.     2. Safety Assessment: History of passive thoughts without intent/plan (2020), but denies current thoughts of SI, plan/intent.  No h/o SA. RF include , unmarried/single, living alone/lack of social support, history of trauma/abuse, chronic medical illness, chronic pain, current psychiatric illness, feelings of hopelessness, and panic attacks. PF include strong coping skills, hopefulness/future orientation, and employment, engaged in care, future oriented, no guns.  Medium imminent risk     3. @  Problem List Items Addressed This Visit       PTSD (post-traumatic stress disorder)    Relevant Medications    vilazodone (Viibryd) 20 mg tablet    Generalized anxiety disorder    Relevant Medications    vilazodone (Viibryd) 20 mg tablet    Depression    Relevant Medications    vilazodone (Viibryd) 20 mg tablet     Other Visit  Diagnoses       Chronic fatigue        Relevant Medications    modafinil (ProvigiL) 100 mg tablet    Sleep disturbance                 START modafinil 100 mg daily  START Viibryd 20 mg daily starting 4/9/25 (1 week after starting modafinil)     Reviewed r/b/a, possible side effects of the medication. Client is aware about the benefit outweighs the risk.     4. INDIVIDUAL THERAPY: Private [facility], weekly (~4yrs)     5. OARRS: last filled Gabapentin 300 mg on 12/12/2024 (270 caps x 90 days), Temazepam 7.5 mg on 11/11/2024      6. Labs reviewed    7. Last Urine Drug Screen  Date of Last Screen: 3/20/2025    8. Controlled Substance Agreement:  Date of the Last Agreement: 3/5/2025  Reviewed Controlled Substance Agreement including but not limited to the benefits, risks, and alternatives to treatment with a Controlled Substance medication(s).    9. Follow-up with this provider in 3 weeks.     10. -Total time: 27 minutes via virtual     - Follow up with physical health providers as scheduled  - May follow up sooner if experiences worsening symptoms by calling  Psychiatry at [phone]  - Patient verbalized an understanding to call Mobile Crisis at [phone] ([facility]), 211, or 763/go to the nearest emergency room if experiences thoughts of harm to self or others.

## 2025-04-02 NOTE — PROGRESS NOTES
PROVIDER:STEPHANIE Raphael   MEDICATION/DOSAGE:    modafinil (ProvigiL) 100 mg tablet     QTY/SUPPLY:  PRIOR AUTH COMPLETED VIA:epic  INSURANCE:   Selected coverage:[employer] ([employer])Total coverages:1 Demographics on file   VERENA WHALEN ([employer])  Covered: Retail, Mail OrderUnknown: Specialty, Long-Term Care                  Member ID: [id number] BIN: [id number]  : 1990   Group ID: [id number] PCN: [id number]  Legal sex: F   Group name: [id number]  Address: [address] [place] [address] [address] [place] [address]   Use As Primary Coverage      n for Denial (in whole or in part): ? The requested medication is covered for the diagnoses of excessive daytime sleepiness associated with narcolepsy, excessive daytime sleepiness associated with obstructive sleep apnea/hypopnea syndrome, excessive sleepiness associated with shift work sleep disorder, fatigue associated with multiple sclerosis (MS), excessive daytime sleepiness associated with myotonic dystrophy, excessive daytime sleepiness associated with Parkinson's disease (PD), adjunctive/augmentation treatment of depression in adults, and idiopathic hypersomnia.

## 2025-04-04 ENCOUNTER — INFUSION (OUTPATIENT)
Dept: INFUSION THERAPY | Facility: CLINIC | Age: 35
End: 2025-04-04
Payer: COMMERCIAL

## 2025-04-04 VITALS
HEART RATE: 93 BPM | SYSTOLIC BLOOD PRESSURE: 128 MMHG | DIASTOLIC BLOOD PRESSURE: 67 MMHG | RESPIRATION RATE: 14 BRPM | OXYGEN SATURATION: 96 % | TEMPERATURE: 97.9 F

## 2025-04-04 DIAGNOSIS — M79.7 FIBROMYALGIA: ICD-10-CM

## 2025-04-04 LAB — PREGNANCY TEST URINE, POC: NEGATIVE

## 2025-04-04 PROCEDURE — 96375 TX/PRO/DX INJ NEW DRUG ADDON: CPT | Mod: INF

## 2025-04-04 PROCEDURE — 96365 THER/PROPH/DIAG IV INF INIT: CPT | Mod: INF

## 2025-04-04 PROCEDURE — 2500000004 HC RX 250 GENERAL PHARMACY W/ HCPCS (ALT 636 FOR OP/ED): Performed by: NURSE PRACTITIONER

## 2025-04-04 PROCEDURE — 96368 THER/DIAG CONCURRENT INF: CPT | Mod: INF

## 2025-04-04 PROCEDURE — 81025 URINE PREGNANCY TEST: CPT

## 2025-04-04 RX ORDER — NITROGLYCERIN 0.4 MG/1
0.4 TABLET SUBLINGUAL ONCE
OUTPATIENT
Start: 2025-04-18 | End: 2025-04-18

## 2025-04-04 RX ORDER — EPINEPHRINE 0.3 MG/.3ML
0.3 INJECTION SUBCUTANEOUS EVERY 5 MIN PRN
Status: CANCELLED | OUTPATIENT
Start: 2025-04-18

## 2025-04-04 RX ORDER — KETAMINE HCL IN NACL, ISO-OSM 100MG/10ML
SYRINGE (ML) INJECTION ONCE
Status: CANCELLED | OUTPATIENT
Start: 2025-04-18

## 2025-04-04 RX ORDER — HEPARIN 100 UNIT/ML
5 SYRINGE INTRAVENOUS AS NEEDED
Status: DISCONTINUED | OUTPATIENT
Start: 2025-04-04 | End: 2025-04-04 | Stop reason: HOSPADM

## 2025-04-04 RX ORDER — METOCLOPRAMIDE HYDROCHLORIDE 5 MG/ML
10 INJECTION INTRAMUSCULAR; INTRAVENOUS ONCE
Status: CANCELLED | OUTPATIENT
Start: 2025-04-18 | End: 2025-04-18

## 2025-04-04 RX ORDER — DIPHENHYDRAMINE HYDROCHLORIDE 50 MG/ML
50 INJECTION, SOLUTION INTRAMUSCULAR; INTRAVENOUS AS NEEDED
OUTPATIENT
Start: 2025-04-18

## 2025-04-04 RX ORDER — EPINEPHRINE 0.3 MG/.3ML
0.3 INJECTION SUBCUTANEOUS EVERY 5 MIN PRN
OUTPATIENT
Start: 2025-04-18

## 2025-04-04 RX ORDER — KETAMINE HCL IN NACL, ISO-OSM 100MG/10ML
SYRINGE (ML) INJECTION ONCE
Status: COMPLETED | OUTPATIENT
Start: 2025-04-04 | End: 2025-04-04

## 2025-04-04 RX ORDER — DIPHENHYDRAMINE HYDROCHLORIDE 50 MG/ML
25 INJECTION, SOLUTION INTRAMUSCULAR; INTRAVENOUS ONCE
Status: CANCELLED | OUTPATIENT
Start: 2025-04-18 | End: 2025-04-18

## 2025-04-04 RX ORDER — ONDANSETRON HYDROCHLORIDE 2 MG/ML
4 INJECTION, SOLUTION INTRAVENOUS ONCE
Status: COMPLETED | OUTPATIENT
Start: 2025-04-04 | End: 2025-04-04

## 2025-04-04 RX ORDER — METOPROLOL TARTRATE 25 MG/1
25 TABLET, FILM COATED ORAL ONCE
OUTPATIENT
Start: 2025-04-18 | End: 2025-04-18

## 2025-04-04 RX ORDER — KETOROLAC TROMETHAMINE 30 MG/ML
30 INJECTION, SOLUTION INTRAMUSCULAR; INTRAVENOUS ONCE
Status: COMPLETED | OUTPATIENT
Start: 2025-04-04 | End: 2025-04-04

## 2025-04-04 RX ORDER — FAMOTIDINE 10 MG/ML
20 INJECTION, SOLUTION INTRAVENOUS ONCE AS NEEDED
Status: CANCELLED | OUTPATIENT
Start: 2025-04-18

## 2025-04-04 RX ORDER — DIPHENHYDRAMINE HYDROCHLORIDE 50 MG/ML
25 INJECTION, SOLUTION INTRAMUSCULAR; INTRAVENOUS ONCE
OUTPATIENT
Start: 2025-04-18 | End: 2025-04-18

## 2025-04-04 RX ORDER — ONDANSETRON HYDROCHLORIDE 2 MG/ML
4 INJECTION, SOLUTION INTRAVENOUS ONCE
OUTPATIENT
Start: 2025-04-18 | End: 2025-04-18

## 2025-04-04 RX ORDER — HEPARIN 100 UNIT/ML
5 SYRINGE INTRAVENOUS AS NEEDED
OUTPATIENT
Start: 2025-04-18

## 2025-04-04 RX ORDER — ALBUTEROL SULFATE 0.83 MG/ML
3 SOLUTION RESPIRATORY (INHALATION) AS NEEDED
Status: CANCELLED | OUTPATIENT
Start: 2025-04-18

## 2025-04-04 RX ORDER — FAMOTIDINE 10 MG/ML
20 INJECTION, SOLUTION INTRAVENOUS ONCE AS NEEDED
OUTPATIENT
Start: 2025-04-18

## 2025-04-04 RX ORDER — NITROGLYCERIN 0.4 MG/1
0.4 TABLET SUBLINGUAL ONCE
Status: CANCELLED | OUTPATIENT
Start: 2025-04-18 | End: 2025-04-18

## 2025-04-04 RX ORDER — METOPROLOL TARTRATE 25 MG/1
25 TABLET, FILM COATED ORAL ONCE
Status: CANCELLED | OUTPATIENT
Start: 2025-04-18 | End: 2025-04-18

## 2025-04-04 RX ORDER — KETOROLAC TROMETHAMINE 30 MG/ML
30 INJECTION, SOLUTION INTRAMUSCULAR; INTRAVENOUS ONCE
Status: CANCELLED | OUTPATIENT
Start: 2025-04-18 | End: 2025-04-18

## 2025-04-04 RX ORDER — METOCLOPRAMIDE HYDROCHLORIDE 5 MG/ML
10 INJECTION INTRAMUSCULAR; INTRAVENOUS ONCE
OUTPATIENT
Start: 2025-04-18 | End: 2025-04-18

## 2025-04-04 RX ORDER — ALBUTEROL SULFATE 0.83 MG/ML
3 SOLUTION RESPIRATORY (INHALATION) AS NEEDED
OUTPATIENT
Start: 2025-04-18

## 2025-04-04 RX ORDER — HEPARIN 100 UNIT/ML
5 SYRINGE INTRAVENOUS AS NEEDED
Status: CANCELLED | OUTPATIENT
Start: 2025-04-18

## 2025-04-04 RX ORDER — DIPHENHYDRAMINE HYDROCHLORIDE 50 MG/ML
50 INJECTION, SOLUTION INTRAMUSCULAR; INTRAVENOUS AS NEEDED
Status: CANCELLED | OUTPATIENT
Start: 2025-04-18

## 2025-04-04 RX ADMIN — Medication 100 MG: at 07:54

## 2025-04-04 RX ADMIN — ONDANSETRON 4 MG: 2 INJECTION INTRAMUSCULAR; INTRAVENOUS at 08:34

## 2025-04-04 RX ADMIN — KETOROLAC TROMETHAMINE 30 MG: 30 INJECTION, SOLUTION INTRAMUSCULAR at 07:52

## 2025-04-04 RX ADMIN — Medication: at 07:54

## 2025-04-04 RX ADMIN — HEPARIN 500 UNITS: 100 SYRINGE at 08:30

## 2025-04-04 ASSESSMENT — COLUMBIA-SUICIDE SEVERITY RATING SCALE - C-SSRS
2. HAVE YOU ACTUALLY HAD ANY THOUGHTS OF KILLING YOURSELF?: NO
6. HAVE YOU EVER DONE ANYTHING, STARTED TO DO ANYTHING, OR PREPARED TO DO ANYTHING TO END YOUR LIFE?: NO
1. IN THE PAST MONTH, HAVE YOU WISHED YOU WERE DEAD OR WISHED YOU COULD GO TO SLEEP AND NOT WAKE UP?: NO
6. HAVE YOU EVER DONE ANYTHING, STARTED TO DO ANYTHING, OR PREPARED TO DO ANYTHING TO END YOUR LIFE?: NO

## 2025-04-04 ASSESSMENT — PAIN SCALES - GENERAL
PAINLEVEL_OUTOF10: 9
PAINLEVEL_OUTOF10: 6

## 2025-04-04 ASSESSMENT — ENCOUNTER SYMPTOMS
DEPRESSION: 1
OCCASIONAL FEELINGS OF UNSTEADINESS: 0
LOSS OF SENSATION IN FEET: 1

## 2025-04-04 ASSESSMENT — PAIN - FUNCTIONAL ASSESSMENT
PAIN_FUNCTIONAL_ASSESSMENT: 0-10
PAIN_FUNCTIONAL_ASSESSMENT: 0-10

## 2025-04-04 ASSESSMENT — PAIN DESCRIPTION - DESCRIPTORS
DESCRIPTORS: ACHING;SORE
DESCRIPTORS: ACHING;SORE

## 2025-04-04 NOTE — PROGRESS NOTES
S: Patient here for 31st opioid sparing pain infusion. Patient reports 50% reduction in pain after last infusion that lasted 1 week.    Purpose of pain infusion meds explained along with potential side effects.  Patient verbalized understanding.    B: Pain Issues are 9/10 generalized pain, worst in her knees and back.   Is Patient breast feeding: no    A: Patient currently has pain described on flow sheet documentation. Designated  is [place]. Patient last ate solid food last evening, and had liquid 1 hour ago.    R: Plan; Obtain IV access, do patient risk assessment, and start opioid sparing infusion as ordered. Monitoring for S/S of adverse reactions.    Post infusion teaching provided. Patient verbalized understanding. VSS, Patient states pain is 6/10 in her knees, but other areas improved more. Will assist patient to waiting car via wheelchair.

## 2025-04-04 NOTE — PATIENT INSTRUCTIONS
Today :We administered ketamine 30 mg-lidocaine 300 mg, propofol (Diprivan), and ketorolac.     For:   1. Fibromyalgia         Your next appointment is due in:  2 weeks        Please read the  Medication Guide that was given to you and reviewed during todays visit.     (Tell all doctors including dentists that you are taking this medication)     Go to the emergency room or call 911 if:  -You have signs of allergic reaction:   -Rash, hives, itching.   -Swollen, blistered, peeling skin.   -Swelling of face, lips, mouth, tongue or throat.   -Tightness of chest, trouble breathing, swallowing or talking     Call your doctor:  - If IV / injection site gets red, warm, swollen, itchy or leaks fluid or pus.     (Leave dressing on your IV site for at least 2 hours and keep area clean and dry  - If you get sick or have symptoms of infection or are not feeling well for any reason.    (Wash your hands often, stay away from people who are sick)  - If you have side effects from your medication that do not go away or are bothersome.     (Refer to the teaching your nurse gave you for side effects to call your doctor about)    - Common side effects may include:  stuffy nose, headache, feeling tired, muscle aches, upset stomach  - Before receiving any vaccines     - Call the [facility] at   If:  - You get sick, are on antibiotics, have had a recent vaccine, have surgery or dental work and your doctor wants your visit rescheduled.  - You need to cancel and reschedule your visit for any reason. Call at least 2 days before your visit if you need to cancel.   - Your insurance changes before your next visit.    (We will need to get approval from your new insurance. This can take up to two weeks.)     The [facility] is opened Monday thru Friday. We are closed on weekends and holidays.   Voice mail will take your call if the center is closed. If you leave a message please allow 24 hours for a call back  during weekdays. If you leave a message on a weekend/holiday, we will call you back the next business day.    A pharmacist is available Monday - Friday from 8:30AM to 3:30PM to help answer any questions you may have about your prescriptions(s). Please call pharmacy at:    [facility]: [phone]  [facility]: [phone]  [facility]: [phone]              [facility]      Pain Infusion Aftercare Instructions      1. It is normal to feel sedated, tired and low in energy after a pain infusion. DO NOT DRIVE, OPERATE ANY MACHINERY, OR MAKE ANY IMPORTANT DECISIONS FOR AT LEAST 24 HOURS AFTER THE INFUSION.     2. Call the pain center at [phone] with any problems, questions, or concerns.     3. Eat light after the infusion. If you feel queasy or sick to your stomach, laying down with your eyes closed may help. When you resume eating start with something mild like clear liquids, yogurt, applesauce, crackers, etc… Gradually advance to a regular diet.     4. Do not leave your house alone the evening of your pain infusion.     5. No alcohol or sedative medications, such as sleeping pills, for 24 hours after your pain infusion.     6. Resume all other prescribed medications unless directed otherwise by you physician.     7. If you have any medical emergencies, call 911 or go directly to the closest emergency room.    Patient Instructions  IV Infusion  [facility]  1. A responsible adult must stay with you during your infusion and drive you home. If you do not have a responsible adult with transportation home, your appointment will be rescheduled. Patients must still have a responsible adult if they use Rideshare, Uber, or Lyft. Uber, Rideshare, or Lyft drivers do not qualify.   2. On the day of your infusion we ask that you please drink clear liquids until your appointment.  You should NOT eat food 4 hours before your infusion.    3. Take  all medications as prescribed before your infusion. Do not withhold blood pressure medication.   4. Patients who use a CPAP or BIPAP should bring it to the infusion appointment.   5. Children under 16 will not be permitted in the infusion clinic. Please do not bring young children or pets. For patients who must bring a service animal, paperwork will be required. NO EXCEPTIONS.  6.  All Women will be required to take a pregnancy test prior to the infusion unless they are above 55 years of age or have had a hysterectomy.   7. Patients who cancel their infusion should call the Infusion line at [phone] as soon as possible. We would appreciate a 48-hour notice. Please be on time for the appt. Patients who are more than 15 mins late will have to cancel and reschedule. Infusion appt times are minimal. Patients who do not show, cancel, or reschedule an appointment may have a long wait until we can get them back on the schedule.   8. We make every effort to schedule your infusions based on your physician's recommendations. However, factors will affect our infusion schedule and availability. We appreciate your understanding.  9. Appointments will only be scheduled for two appointments in the future.   10. No eating, drinking, or chewing gum during the infusion.   11. If you miss or cancel your infusion appointment it is YOUR responsibility to call us and reschedule.  Please call [phone] or [phone] to reschedule or cancel appointment

## 2025-04-07 ENCOUNTER — TELEPHONE (OUTPATIENT)
Dept: BEHAVIORAL HEALTH | Facility: CLINIC | Age: 35
End: 2025-04-07
Payer: COMMERCIAL

## 2025-04-07 DIAGNOSIS — R53.82 CHRONIC FATIGUE: ICD-10-CM

## 2025-04-07 DIAGNOSIS — G47.11 IDIOPATHIC HYPERSOMNIA: ICD-10-CM

## 2025-04-07 RX ORDER — MODAFINIL 100 MG/1
100 TABLET ORAL DAILY
Qty: 30 TABLET | Refills: 1 | Status: SHIPPED | OUTPATIENT
Start: 2025-04-07 | End: 2025-06-06

## 2025-04-07 NOTE — PROGRESS NOTES
PROVIDER:STEPHANIE Raphael   MEDICATION/DOSAGE:modafinil (ProvigiL) 100 mg tablet   QTY/SUPPLY:  PRIOR AUTH COMPLETED VIA:epic  INSURANCE:   Selected coverage:VERENA WHALEN [employer] ([employer])Total coverages:1 Demographics on file   VERENA WHALEN ([employer])  Covered: Retail, Mail OrderUnknown: Specialty, Long-Term Care                  Member ID: [id number] BIN: [id number]  : 1990   Group ID: [id number] PCN: [id number]  Legal sex: F   Group name: [facility]  Address: [address] [place] [address] [address] [place] [address]   Use As Primary Coverage        STATUS pending- PA resent with dx that is covered    Appeal supported: No   Note from payer: CaseId:[id number];Status:Approved;Review Type:Prior Auth;Coverage Start Date:2025;Coverage End Date:2026;   Payer: [employer] Patient's Payer Case ID: [id number]    [phone]

## 2025-04-08 ENCOUNTER — APPOINTMENT (OUTPATIENT)
Dept: NEUROLOGY | Facility: CLINIC | Age: 35
End: 2025-04-08
Payer: COMMERCIAL

## 2025-04-09 ENCOUNTER — APPOINTMENT (OUTPATIENT)
Dept: HEMATOLOGY/ONCOLOGY | Facility: CLINIC | Age: 35
End: 2025-04-09
Payer: COMMERCIAL

## 2025-04-10 DIAGNOSIS — G43.009 MIGRAINE WITHOUT AURA AND WITHOUT STATUS MIGRAINOSUS, NOT INTRACTABLE: ICD-10-CM

## 2025-04-10 RX ORDER — FREMANEZUMAB-VFRM 225 MG/1.5ML
225 INJECTION SUBCUTANEOUS
Qty: 1.5 ML | Refills: 2 | Status: SHIPPED | OUTPATIENT
Start: 2025-04-10

## 2025-04-14 ENCOUNTER — APPOINTMENT (OUTPATIENT)
Dept: RADIOLOGY | Facility: CLINIC | Age: 35
End: 2025-04-14
Payer: COMMERCIAL

## 2025-04-14 ASSESSMENT — ENCOUNTER SYMPTOMS
NAUSEA: 0
AGITATION: 0
DIZZINESS: 0
FATIGUE: 1
DIARRHEA: 0
DYSURIA: 0
SHORTNESS OF BREATH: 0
DIFFICULTY URINATING: 0
VOMITING: 0
COLOR CHANGE: 0
FEVER: 0

## 2025-04-14 NOTE — PROGRESS NOTES
Assessment/Plan   Today's presentation is consistent with fibromyalgia/myofascial pain syndrome alongside postural strain and somatic dysfunction contributing to her pain syndrome, also has been diagnosed with inflammatory arthritis (not rheumatoid), PCOS, and has had elevated ESR/CRP and been under rheumatologic care.  Complicating factors include chronicity of symptoms as well as body habitus.  We will implement a trial of care to include various manipulative techniques which will range from instrument assisted to diversified.  We will utilize soft tissue techniques such as active release technique to the cervical spine musculature.  We will closely monitor their response to care to determine if any changes need to occur, if advanced imaging is needed, or if referral to other providers is appropriate. She will also be receiving acupuncture and integrative medicine consultation which is appropriate when considering her overall presentation     Full spine EOS radiographs 2024 - 12 degree dextroconvex scoliosis from T11 to L3. Hyperkyphosis in TS and hyperlordosis in LS    Brain MRI 2024 - IMPRESSION:  1. No MR evidence of acute intracranial infarct, hemorrhage, mass, or  mass effect.  2. Nonspecific 6 mm white matter FLAIR signal hyperintensity adjacent  to the trigone of the right lateral ventricle, which may be  indicative of minimal infectious inflammatory change, migraine  disorder, demyelinating disease, or vasculitis for instance in the  appropriate clinical context. Consider follow-up exam if clinically  indicated.    LS radiographs 2024 - IMPRESSION:  No acute pathologic findings are identified.    CS radiographs 2024 - IMPRESSION:  No pathologic findings are identified.    TS radiographs 2024 - IMPRESSION:  No acute pathologic findings are identified.  Thoracic lumbar dextroscoliosis.  Lower thoracic mild spondylosis.    Visits this year: 6    Subjective   Patient notes moderate diffuse HA, moderate  constant mid to low back pain and neck pain and tightness locally. Continues tapering off omeprazole with some stomach discomfort, seeing various providers including psychiatry and feels benefit from modafinil, seeing rheumatologist and on lower dose of sulfasalazine. Notes some improvement in fatigue. Our treatments help alleviate spine pain then it gradually returns.    HPI - 10/05/23 (CR): the patient presents today per the referral of Dr. Suárez and pain management for evaluation and and management of chronic full spine complaints. She has in the past been diagnosed with fibromyalgia. She has dealt with pain for several years and has received chiropractic care in the past. She did have mixed results with chiropractic care in the past, reporting a variation of instrument assisted manipulation and manual manipulation. But she wished to attend chiropractic care again through a hospital-based provider. She is under the care of pain management and will be starting infusions next week. She is also on the wait list for infusions at [facility] if needed. Overall, her pain levels remain moderate to severe. Her pain is constant. Symptoms in the lower back and hips seem to be the area of most intensity. But she does continue to experience pain throughout the spine. She experiences paresthesias in the upper and lower extremities bilaterally. She has difficulty finding any comfortable positions     Review of Systems   Constitutional:  Positive for fatigue. Negative for fever.   Eyes:  Negative for visual disturbance.   Respiratory:  Negative for shortness of breath.    Cardiovascular:  Negative for chest pain.   Gastrointestinal:  Negative for diarrhea, nausea and vomiting.   Genitourinary:  Negative for difficulty urinating and dysuria.   Skin:  Negative for color change.   Neurological:  Negative for dizziness.   Psychiatric/Behavioral:  Negative for agitation.    All other systems reviewed and are  negative.    Objective   Examination findings (e.g., palpation & ROM): Decreased C/S and L/S ROM with pain, hypertonic and tender cervical and lumbar erectors, BL upper trapezius  SLR BL 65    Segmental joint dysfunction was identified in the following areas using motion palpation and/or pain provocation assessment:  Cervical: 2  Thoracic: 7-9  Lumbopelvic: 1-3, BL SIJ      Physical Exam  Neurological:      General: No focal deficit present.      Mental Status: She is alert.      Motor: Motor function is intact.      Coordination: Coordination is intact.      Gait: Gait is intact.      Deep Tendon Reflexes: Reflexes are normal and symmetric.      Reflex Scores:       Tricep reflexes are 2+ on the right side and 2+ on the left side.       Bicep reflexes are 2+ on the right side and 2+ on the left side.       Brachioradialis reflexes are 2+ on the right side and 2+ on the left side.       Patellar reflexes are 2+ on the right side and 2+ on the left side.       Achilles reflexes are 2+ on the right side and 2+ on the left side.     Comments: Trace Jaylin's BL  4/15/25       Plan   Today's treatment:  SMT to regions of segmental dysfunction identified on exam, using age-appropriate force, and manual diversified technique.   Mobilization on CS  STM to patient tolerance to hypertonic paraspinal muscles, upper trapezius BL  Manual dynamic stretching of the bl gluteal mm & hamstrings  10 to 10:18 AM  Patient noted improved mobility and reduced pain post-treatment    Treatment Plan:   The patient and I discussed the risks and benefits of chiropractic care. Based on the patient's subjective complaints along with the examination findings, it is advised that a course of chiropractic treatment be initiated. The patient provided consent for care. The patient tolerated today's treatment with little or no additional discomfort and was instructed to contact the office for questions or concerns. Will see patient once per week then  every 2 weeks when symptoms become mild/manageable, further spaced apart contingent upon improvement.     This chart note was generated using dictation software, and as such, there may be typographical errors present. Abbreviations: Cervical spine (CS), cervical-thoracic (CT), Dry needling (DN), Flexion adduction internal rotation (FAIR), high velocity, low amplitude (HVLA), Lumbar spine (LS), Soft tissue manipulation (STM), spinal manipulative therapy (SMT), Straight leg raise (SLR), Thoracic spine (TS).

## 2025-04-15 ENCOUNTER — APPOINTMENT (OUTPATIENT)
Dept: INTEGRATIVE MEDICINE | Facility: CLINIC | Age: 35
End: 2025-04-15
Payer: COMMERCIAL

## 2025-04-15 DIAGNOSIS — M99.05 SOMATIC DYSFUNCTION OF PELVIS REGION: ICD-10-CM

## 2025-04-15 DIAGNOSIS — M79.10 MYALGIA: ICD-10-CM

## 2025-04-15 DIAGNOSIS — M99.02 SOMATIC DYSFUNCTION OF THORACIC REGION: Primary | ICD-10-CM

## 2025-04-15 DIAGNOSIS — M99.03 SOMATIC DYSFUNCTION OF LUMBAR REGION: ICD-10-CM

## 2025-04-15 DIAGNOSIS — G89.29 CHRONIC BILATERAL LOW BACK PAIN WITHOUT SCIATICA: ICD-10-CM

## 2025-04-15 DIAGNOSIS — M99.01 CERVICAL SEGMENT DYSFUNCTION: ICD-10-CM

## 2025-04-15 DIAGNOSIS — M54.50 CHRONIC BILATERAL LOW BACK PAIN WITHOUT SCIATICA: ICD-10-CM

## 2025-04-15 DIAGNOSIS — M54.2 NECK PAIN: ICD-10-CM

## 2025-04-16 ENCOUNTER — DOCUMENTATION WITH CHARGES (OUTPATIENT)
Dept: PHYSICAL THERAPY | Facility: CLINIC | Age: 35
End: 2025-04-16
Payer: COMMERCIAL

## 2025-04-16 DIAGNOSIS — M54.41 CHRONIC LOW BACK PAIN WITH BILATERAL SCIATICA, UNSPECIFIED BACK PAIN LATERALITY: Primary | ICD-10-CM

## 2025-04-16 DIAGNOSIS — Q67.5 CONGENITAL SCOLIOSIS: ICD-10-CM

## 2025-04-16 DIAGNOSIS — G89.29 CHRONIC LOW BACK PAIN WITH BILATERAL SCIATICA, UNSPECIFIED BACK PAIN LATERALITY: Primary | ICD-10-CM

## 2025-04-16 DIAGNOSIS — M54.42 CHRONIC LOW BACK PAIN WITH BILATERAL SCIATICA, UNSPECIFIED BACK PAIN LATERALITY: Primary | ICD-10-CM

## 2025-04-18 ENCOUNTER — TELEMEDICINE (OUTPATIENT)
Dept: PAIN MEDICINE | Facility: CLINIC | Age: 35
End: 2025-04-18
Payer: COMMERCIAL

## 2025-04-18 DIAGNOSIS — M79.7 FIBROMYALGIA: Primary | ICD-10-CM

## 2025-04-18 DIAGNOSIS — M25.561 PAIN IN BOTH KNEES, UNSPECIFIED CHRONICITY: ICD-10-CM

## 2025-04-18 DIAGNOSIS — M25.562 PAIN IN BOTH KNEES, UNSPECIFIED CHRONICITY: ICD-10-CM

## 2025-04-18 PROCEDURE — 99213 OFFICE O/P EST LOW 20 MIN: CPT | Performed by: NURSE PRACTITIONER

## 2025-04-18 RX ORDER — KETOROLAC TROMETHAMINE 30 MG/ML
30 INJECTION, SOLUTION INTRAMUSCULAR; INTRAVENOUS ONCE
Status: CANCELLED | OUTPATIENT
Start: 2025-04-18 | End: 2025-04-18

## 2025-04-18 ASSESSMENT — ENCOUNTER SYMPTOMS
PALPITATIONS: 0
LOSS OF SENSATION IN FEET: 1
CHILLS: 0
AGITATION: 0
WHEEZING: 0
OCCASIONAL FEELINGS OF UNSTEADINESS: 0
CONSTIPATION: 0
NAUSEA: 0
SHORTNESS OF BREATH: 0
MYALGIAS: 1
HEADACHES: 0
DIZZINESS: 0
FATIGUE: 0
DIARRHEA: 0
DEPRESSION: 0
CONFUSION: 0
BACK PAIN: 1
CHEST TIGHTNESS: 0
FREQUENCY: 0

## 2025-04-18 ASSESSMENT — COLUMBIA-SUICIDE SEVERITY RATING SCALE - C-SSRS
6. HAVE YOU EVER DONE ANYTHING, STARTED TO DO ANYTHING, OR PREPARED TO DO ANYTHING TO END YOUR LIFE?: NO
2. HAVE YOU ACTUALLY HAD ANY THOUGHTS OF KILLING YOURSELF?: NO
1. IN THE PAST MONTH, HAVE YOU WISHED YOU WERE DEAD OR WISHED YOU COULD GO TO SLEEP AND NOT WAKE UP?: NO

## 2025-04-18 ASSESSMENT — PAIN SCALES - GENERAL: PAINLEVEL_OUTOF10: 6

## 2025-04-18 ASSESSMENT — PAIN - FUNCTIONAL ASSESSMENT: PAIN_FUNCTIONAL_ASSESSMENT: 0-10

## 2025-04-18 ASSESSMENT — PAIN DESCRIPTION - DESCRIPTORS: DESCRIPTORS: ACHING;SHOOTING

## 2025-04-18 NOTE — PROGRESS NOTES
Virtual or Telephone Consent    An interactive audio and video telecommunication system which permits real time communications between the patient (at the originating site) and provider (at the distant site) was utilized to provide this telehealth service.   Verbal consent was requested and obtained from Sasha Longoria on this date, 04/18/25 for a telehealth visit and the patient's location was confirmed at the time of the visit.    Chief Complain  Follow-up for widespread muscle and joint pain with lower back pain and bilateral knee pain    History Of Present Illness  Sasha Longoria is a 34 y.o. female here for widespread muscle and joint pain with lower back pain and bilateral knee pain. The patient rates the pain at 6 on a scale from 0-10.  The patient describes pain as aching, shooting.  The pain is worsened by bending forward, standing, walking, lifting, twisting, going up stairs, going down stairs, and squatting and is alleviated by medications Tylenol and IV infusion therapy, position change, sitting, and lying down.  Since the last visit the pain has improved.    The patient denies any fever, chills, weight loss, weakness, bladder/ bowel incontinence, history of cancer, history of IV drug abuse, recent trauma.     Prior office visit:  12/31/24  Sasha Longoria is a 34 y.o. female recall past medical history of obesity BMI 61, anxiety and depression, PTSD, migraines receives Botox injections, fibromyalgia on gabapentin 300 mg 3 times daily, who is being treated with IV infusion therapy every 2 weeks for chronic widespread body aches and pains that radiate to upper and lower extremities.  IV infusion therapy provides 60% relief fibromyalgia pain for roughly 7 to 10 days.  Of note she reports less frequent flareups and less severe flareups when she does have flareups.  She is very involved with functional medicine doctor who is a improving her gut health and assisting with weight loss.   She is currently on Zepbound.  She denies any new neurological or constitutional symptoms.  Given the relief the IV infusion therapy is providing of her fibromyalgia pain plan to continue with IV infusion therapy every 2 weeks with the goal of transition every 3 weeks.  All questions and concerns answered.  Plan to follow-up in 3 months or sooner if needed.     Portions of record reviewed for pertinent issues: active problem list, medication list, allergies, family history, social history, notes from last encounter, encounters, lab results, imaging and other available records.    I have personally reviewed the [facility] report for this patient. This report is scanned into the electronic medical record. I have considered the risks of abuse, dependence, addiction and diversion. It showed: Gabapentin from myself and Vyvanse trial from Dr. Carrizales was discontinued. Ambien and Eszopiclone from Dr. Guerra   Aberrant behavior: None    Past Medical History  She has a past medical history of Abnormal Pap smear of cervix, Anxiety, Arthritis (~ 2013), Chronic pain disorder, Depression, Eczema (~ 2005), Fibromyalgia, primary, GERD (gastroesophageal reflux disease) (~2018), Headache, Headache, tension-type, HPV (human papilloma virus) infection, Joint pain, Low back pain, Memory loss, Migraine, Neck pain, Neuromuscular disorder (Multi) (2020), Numbness, Obstructive sleep apnea, Polycystic ovary syndrome, Scoliosis (~2005), and Visual impairment (~2000).    Surgical History  She has a past surgical history that includes Other surgical history (09/25/2019); Other surgical history (04/26/2022); and Tonsillectomy.     Social History  She reports that she has never smoked. She has never used smokeless tobacco. She reports current alcohol use of about 1.0 standard drink of alcohol per week. She reports that she does not currently use drugs after having used the following drugs: Marijuana. Frequency: 4.00 times per week.    Family  History  Family History[1]     Allergies  Patient has no known allergies.    Review of Systems  Review of Systems   Constitutional:  Negative for chills and fatigue.   Respiratory:  Negative for chest tightness, shortness of breath and wheezing.    Cardiovascular:  Negative for chest pain and palpitations.   Gastrointestinal:  Negative for constipation, diarrhea and nausea.        On Zepbound currently lost 245 pounds.   Genitourinary:  Negative for frequency and urgency.   Musculoskeletal:  Positive for back pain and myalgias.        Widespread muscle and joint pain.  Lower back and knee pain worse.  Of note she was weaned off of sulfasalazine and was put back on it after checking labs, due to elevated CRP per rheumatology.  Right chest wall Medi port placed.     Neurological:  Negative for dizziness and headaches.   Psychiatric/Behavioral:  Negative for agitation, confusion and suicidal ideas.         Physical Exam  Physical Exam  Constitutional:       General: She is not in acute distress.     Appearance: Normal appearance. She is obese.   Neurological:      General: No focal deficit present.      Mental Status: She is alert and oriented to person, place, and time.      GCS: GCS eye subscore is 4. GCS verbal subscore is 5.   Psychiatric:         Attention and Perception: Attention and perception normal.         Mood and Affect: Mood normal.         Speech: Speech normal.         Behavior: Behavior normal.         Thought Content: Thought content normal.         Cognition and Memory: Cognition normal.         Judgment: Judgment normal.           Last Recorded Vitals  Virtual visit    Reviewed Images  10/14/2024 cervical spine x-ray  FINDINGS:  Cervical spine, four views  There is no fracture. There is no spondylolisthesis. There is no disc  space narrowing or osteophytosis. The prevertebral soft tissues are  within normal limits.      IMPRESSION:  No acute abnormality in the cervical spine     10/14/2024 x-ray  lumbar spine  FINDINGS:  Lumbar spine, four views      There is no fracture. There is no spondylolisthesis. There is no disc  space narrowing or osteophytosis. The prevertebral soft tissues are  within normal limits.      IMPRESSION:  Normal radiographs of the lumbar spine    Reviewed Labs  Lab Results   Component Value Date    GLUCOSE 74 11/07/2024    CALCIUM 8.8 11/07/2024     11/07/2024    K 4.4 11/07/2024    CO2 32 11/07/2024     11/07/2024    BUN 11 11/07/2024    CREATININE 0.82 11/07/2024         Assessment/Plan     Sasha Longoria is a 34 y.o. female recall past medical history of obesity BMI 61, anxiety and depression, PTSD, migraines receives Botox injections, fibromyalgia off gabapentin, who is being treated with IV infusion therapy every 2 weeks for chronic widespread body aches with lower back pain and bilateral knee pain.  IV infusion therapy provides 50% relief of fibromyalgia pain and neuropathic pain for roughly 7 to 10 days.  She does not endorse having more life stressors which is aggravated some of her symptoms.  She continues her weight loss journey on Zepbound currently lost over 245 pounds.  She continues to be proactive in improving her quality life and trying to wean off medications to reduce medication side effects.  She denies any new neurological or constitutional symptoms.  She denies any bowel or bladder incontinence or saddle paresthesia.  Given the relief the IV infusion therapy provides for widespread muscle, joint and neuropathic symptom relief plan to continue with IV infusion therapy every 2 weeks with the goal of transition every 3 weeks in the future.  Patient verbalized understand plan of care.  All questions and concerns answered.    Plan  At least 50% of the visit was involved in the discussion of the options for treatment. We discussed exercises, medication, interventional therapies and surgery. Healthy life style is essential with patient hard work to  achieve the wellness. In addition; discussion with the patient and/or family about any of the diagnostic results, impressions and/or recommended diagnostic studies, prognosis, risks and benefits of treatment options, instructions for treatment and/or follow-up, importance of compliance with chosen treatment options, risk-factor reduction, and patient/family education.   Continue self-directed physical therapy  Continue with weight loss, and setting goals to increase activity levels as tolerated.  Continue with IV infusion therapy for widespread muscle joint and neuropathic pain symptom management.  Healthy lifestyle and anti-inflammatory diet in addition to weight control discussed with the patient  Alternative chronic pain therapies was discussed, encouraged and information was handed  Return to Clinic 3 months or sooner if needed.     *Please note this report has been produced using speech recognition software and may contain errors related to that system including grammar, punctuation and spelling as well as words and phrases that may be inappropriate. If there are questions or concerns, please feel free to contact me to clarify.      I spent 29 minutes in the professional and overall care of this patient.       Damien Dos Santos, APRN-CNP            [1]   Family History  Problem Relation Name Age of Onset    Breast cancer Paternal Grandmother      Arthritis Mother Haylee     Depression Mother Haylee     Diabetes Mother Haylee     Heart disease Mother Haylee     Hypertension Mother Haylee     Learning disabilities Mother Haylee     Intellectual Disability Mother Haylee     Miscarriages / Stillbirths Mother Haylee      labor Mother Haylee     Depression Father Lowell     Diabetes Father Lowell     Drug abuse Father Lowell     Hypertension Father Lowell     Mental illness Father Lowell     Alcohol abuse Maternal Grandfather Tera     Depression Maternal Grandfather Tera     Drug abuse Maternal Grandfather Tera      Heart disease Maternal Grandfather Tera     Stroke Maternal Grandfather Tera     Cancer Maternal Grandmother Carri     Depression Maternal Grandmother Carri     Diabetes Maternal Grandmother Carri     Depression Paternal Grandfather Whit     Heart disease Paternal Grandfather Whit     Hypertension Paternal Grandfather Whit     Migraines Paternal Grandfather Whit     Cancer Sister Amina     Depression Sister Amina     Cancer Sister Cayla     Depression Sister Cayla     Drug abuse Sister Cayla     Hypertension Sister Cayla     Miscarriages / Stillbirths Sister Cayla     Depression Sister Kenzie     Depression Sister Maria L     Depression Brother Chris     Drug abuse Mother's Brother Tera     Learning disabilities Brother Crow     Intellectual Disability Brother Crow

## 2025-04-21 DIAGNOSIS — M79.7 FIBROMYALGIA: Primary | ICD-10-CM

## 2025-04-21 PROCEDURE — RXMED WILLOW AMBULATORY MEDICATION CHARGE

## 2025-04-21 RX ORDER — KETOROLAC TROMETHAMINE 30 MG/ML
30 INJECTION, SOLUTION INTRAMUSCULAR; INTRAVENOUS ONCE
OUTPATIENT
Start: 2025-04-23 | End: 2025-04-23

## 2025-04-21 RX ORDER — HEPARIN 100 UNIT/ML
5 SYRINGE INTRAVENOUS AS NEEDED
OUTPATIENT
Start: 2025-04-23

## 2025-04-23 ENCOUNTER — APPOINTMENT (OUTPATIENT)
Dept: INFUSION THERAPY | Facility: CLINIC | Age: 35
End: 2025-04-23
Payer: COMMERCIAL

## 2025-04-23 ENCOUNTER — APPOINTMENT (OUTPATIENT)
Dept: BEHAVIORAL HEALTH | Facility: CLINIC | Age: 35
End: 2025-04-23
Payer: COMMERCIAL

## 2025-04-23 DIAGNOSIS — G47.11 IDIOPATHIC HYPERSOMNIA: ICD-10-CM

## 2025-04-23 DIAGNOSIS — F43.10 PTSD (POST-TRAUMATIC STRESS DISORDER): ICD-10-CM

## 2025-04-23 DIAGNOSIS — F33.1 MODERATE EPISODE OF RECURRENT MAJOR DEPRESSIVE DISORDER: ICD-10-CM

## 2025-04-23 DIAGNOSIS — F41.1 GENERALIZED ANXIETY DISORDER: ICD-10-CM

## 2025-04-23 DIAGNOSIS — G47.9 SLEEP DISTURBANCE: ICD-10-CM

## 2025-04-23 PROCEDURE — 99214 OFFICE O/P EST MOD 30 MIN: CPT

## 2025-04-23 RX ORDER — MODAFINIL 200 MG/1
200 TABLET ORAL DAILY
Qty: 30 TABLET | Refills: 1 | Status: SHIPPED | OUTPATIENT
Start: 2025-04-28 | End: 2025-06-27

## 2025-04-23 RX ORDER — BUSPIRONE HYDROCHLORIDE 15 MG/1
15 TABLET ORAL 2 TIMES DAILY
Qty: 60 TABLET | Refills: 1 | Status: SHIPPED | OUTPATIENT
Start: 2025-04-23 | End: 2025-06-22

## 2025-04-23 RX ORDER — VILAZODONE HYDROCHLORIDE 40 MG/1
40 TABLET ORAL DAILY
Qty: 30 TABLET | Refills: 1 | Status: SHIPPED | OUTPATIENT
Start: 2025-04-23 | End: 2025-06-22

## 2025-04-23 ASSESSMENT — ENCOUNTER SYMPTOMS
DYSURIA: 0
VOMITING: 0
AGITATION: 0
FATIGUE: 1
FEVER: 0
DIFFICULTY URINATING: 0
COLOR CHANGE: 0
DIZZINESS: 0
NAUSEA: 0
DIARRHEA: 0
SHORTNESS OF BREATH: 0

## 2025-04-23 NOTE — PROGRESS NOTES
Assessment/Plan   Today's presentation is consistent with fibromyalgia/myofascial pain syndrome alongside postural strain and somatic dysfunction contributing to her pain syndrome, also has been diagnosed with inflammatory arthritis (not rheumatoid), PCOS, and has had elevated ESR/CRP and been under rheumatologic care.  Complicating factors include chronicity of symptoms as well as body habitus.  We will implement a trial of care to include various manipulative techniques which will range from instrument assisted to diversified.  We will utilize soft tissue techniques such as active release technique to the cervical spine musculature.  We will closely monitor their response to care to determine if any changes need to occur, if advanced imaging is needed, or if referral to other providers is appropriate. She will also be receiving acupuncture and integrative medicine consultation which is appropriate when considering her overall presentation     Full spine EOS radiographs 2024 - 12 degree dextroconvex scoliosis from T11 to L3. Hyperkyphosis in TS and hyperlordosis in LS    Brain MRI 2024 - IMPRESSION:  1. No MR evidence of acute intracranial infarct, hemorrhage, mass, or  mass effect.  2. Nonspecific 6 mm white matter FLAIR signal hyperintensity adjacent  to the trigone of the right lateral ventricle, which may be  indicative of minimal infectious inflammatory change, migraine  disorder, demyelinating disease, or vasculitis for instance in the  appropriate clinical context. Consider follow-up exam if clinically  indicated.    LS radiographs 2024 - IMPRESSION:  No acute pathologic findings are identified.    CS radiographs 2024 - IMPRESSION:  No pathologic findings are identified.    TS radiographs 2024 - IMPRESSION:   No acute pathologic findings are identified.  Thoracic lumbar dextroscoliosis.  Lower thoracic mild spondylosis.    Visits this year: 7    Subjective   Patient endorses moderate constant neck, low back  pain and moderate headache. No radiating symptoms into extremities. Dose of sulfasalazine increased back a bit, now taking half of what she was typically -- thru follow-up with rheumatology. Some stress apart from health -- issues with cat's / pet's health. Notes tightness/pressure in neck. Has upcoming massage    HPI - 10/05/23 (CR): the patient presents today per the referral of Dr. Suárez and pain management for evaluation and and management of chronic full spine complaints. She has in the past been diagnosed with fibromyalgia. She has dealt with pain for several years and has received chiropractic care in the past. She did have mixed results with chiropractic care in the past, reporting a variation of instrument assisted manipulation and manual manipulation. But she wished to attend chiropractic care again through a hospital-based provider. She is under the care of pain management and will be starting infusions next week. She is also on the wait list for infusions at [facility] if needed. Overall, her pain levels remain moderate to severe. Her pain is constant. Symptoms in the lower back and hips seem to be the area of most intensity. But she does continue to experience pain throughout the spine. She experiences paresthesias in the upper and lower extremities bilaterally. She has difficulty finding any comfortable positions     Review of Systems   Constitutional:  Positive for fatigue. Negative for fever.   Eyes:  Negative for visual disturbance.   Respiratory:  Negative for shortness of breath.    Cardiovascular:  Negative for chest pain.   Gastrointestinal:  Negative for diarrhea, nausea and vomiting.   Genitourinary:  Negative for difficulty urinating and dysuria.   Skin:  Negative for color change.   Neurological:  Negative for dizziness.   Psychiatric/Behavioral:  Negative for agitation.    All other systems reviewed and are negative.    Objective   Examination findings (e.g., palpation & ROM):  Decreased C/S and L/S ROM with pain, hypertonic and tender cervical and lumbar erectors, BL upper trapezius  SLR BL 70    Segmental joint dysfunction was identified in the following areas using motion palpation and/or pain provocation assessment:  Cervical: 2  Thoracic: 7-10  Lumbopelvic: 1-3, BL SIJ      Physical Exam  Neurological:      General: No focal deficit present.      Mental Status: She is alert.      Motor: Motor function is intact.      Coordination: Coordination is intact.      Gait: Gait is intact.      Deep Tendon Reflexes: Reflexes are normal and symmetric.      Reflex Scores:       Tricep reflexes are 2+ on the right side and 2+ on the left side.       Bicep reflexes are 2+ on the right side and 2+ on the left side.       Brachioradialis reflexes are 2+ on the right side and 2+ on the left side.       Patellar reflexes are 2+ on the right side and 2+ on the left side.       Achilles reflexes are 2+ on the right side and 2+ on the left side.     Comments: Trace Jaylin's BL  4/15/25       Plan   Today's treatment:  SMT to regions of segmental dysfunction identified on exam, using age-appropriate force, and manual diversified technique.   Mobilization on CS  STM to patient tolerance to hypertonic paraspinal muscles, upper trapezius BL  Manual dynamic stretching of the bl gluteal mm & hamstrings  1:41 to 1:57 PM  Patient noted improved mobility and reduced pain post-treatment    Treatment Plan:   The patient and I discussed the risks and benefits of chiropractic care. Based on the patient's subjective complaints along with the examination findings, it is advised that a course of chiropractic treatment be initiated. The patient provided consent for care. The patient tolerated today's treatment with little or no additional discomfort and was instructed to contact the office for questions or concerns. Will see patient once per week then every 2 weeks when symptoms become mild/manageable, further spaced  apart contingent upon improvement.     This chart note was generated using dictation software, and as such, there may be typographical errors present. Abbreviations: Cervical spine (CS), cervical-thoracic (CT), Dry needling (DN), Flexion adduction internal rotation (FAIR), high velocity, low amplitude (HVLA), Lumbar spine (LS), Soft tissue manipulation (STM), spinal manipulative therapy (SMT), Straight leg raise (SLR), Thoracic spine (TS).

## 2025-04-23 NOTE — PROGRESS NOTES
"Sasha Longoria is a 34 y.o. female patient presenting for a(an) virtual [facility]UV  Visit location: patient (Sasha Longoria, home), provider (LAINEY RaphaelCNP, virtual office)  Pt identify self by name, , and address    Chief Complaint   Patient presents with    Anxiety    Depression    Follow-up    Med Management    Sleeping Problem     Idiopathic hypersomnia, vivid dreams    PTSD (Post-Traumatic Stress Disorder)     HPI    2025  \"Haven't noticed change since starting the Viibryd. Modafinil was helpful during the first couple of days and energy increased but effectiveness has since decreased.\"   Denies any noticeable side effects from the meds.   Anxiety \"is improved a little since staring Buspar.\"  Reports stress in personal life, but handling it a little bit better d/t administration of Buspar  Adherent to medication: Buspar 10 mg BID, Viibryd 20 daily, Modafinil 100 mg daily  Depression: not noticed improvement but reports significant extra stress in personal life  Denies substance use  Denies panic attacks or mood swings  Denies SI/HI/AVH/delusions/shira/hypomania  Sleep - \"intermittent, hard time falling, but able to stay asleep\"  Appetite - normal  Denies nightmares but endorse frequent vivid dreams and flashbacks. Previously tried Prazosin 2 mg with previous psych provider, stopped after starting therapy with behavioral sleep medicine specialist at [facility], stopped Prazosin without allowing full chance to work.    Current S/Sx:  -Mood swings/disorder or bipolar symptoms:   - stable     Depression (single, recurrent, seasonal, specific intermittency):   -Other depressive sx/s: improving fatigue/motivation/brain fog  -Fatigue/Energy: improving  -Motivation: improving   -Concentration: improving \"brain fog\"   -Feeling hope/help/worthless: denies  -Sleep: sleeps vivid dreams overnight since 2024  -Appetite/Weight Changes: fluctuates appetite, no weight changes  - PHQ-9 " (13)    SI/HI  -Reports passive hx of SI without intent/plan. Denies current suicidal intend/plan  -CSSRS Low risks    Weapon safety  -Guns/Weapons at home: denies    Psychosis/schizophrenia  - Psychosis: denies hallucinations/delusions, denies paranoia  - Intrusive thoughts: Denies    Anxiety: (generalized, social, agoraphobia, speaking, specific):   -Worry excessively: present/impactful - especially about relationships  -NINA-7 (14)    PTSD  - Flashbacks: Denies  -Nightmares: daily vivid dreams, nightmares - common  -Other symptoms: hx of night terrors (continue to occur 1-2 times/month), denies night terrors since last visit    OCD  - Obsessive/compulsive thoughts/acts: not assessed    ADHD  - states she was previously assessed by [facility] and  - found not to have ADHD. Foggy brain, poor concentration/fatigue/low motivation might be caused by over medication  - BAARS-IV Questionnaire: not administered     Panic attacks  Denies panic attacks since last visit     HISTORY  PSYCH HISTORY  -Psych Hx: (dx with anxiety & depression - 2009), cPTSD (2020)/sleep disturbance (2016)  -Psych Hospitalization Hx: denies  -Suicide Attempt Hx: denies  -Self-Harm/Violence Hx: denies  -Current psych meds: Wellbutrin  mg daily (2009/2010) (reduced from 300 mg d/t concerns of side effects of taking alongside other meds), Cymbalta 60 mg daily (reduced from 120 mg d/t side effects of sexual dysfunction related - symptoms are more manageable now).  -Psych Med Hx: Vyvanse 20 (noticed minimal improvement), Strattera (25, 40, 80 mg - minimally beneficial). Have tried Effexor ineffective), Abilify (2009/2010). Stopped Wellbutrin with Abilify around 2010/2011 because therapy was effective. States when she resumed care with psychology (2020) & psychiatry (2021), was placed on Zoloft, became in effective overtime, Wellbutrin was re-added in 2023/2024. Have tried Trazodone (taken for several yrs - inconsistently  effective), Mirtazapine (inconsistently helpful) - both ineffective. Was prescribed Prazosin 2 mg (stopped taking because it didn't help much). Just stopped Gabapentin (caused side effects). Weaned off Wellbutrin (2025), Cymbalta (2025) - [facility] results (severe interaction)     SUBSTANCE USE HISTORY  -Substance Use Hx: denies  -ETOH: rarely  -Tobacco: denies  -Caffeine: coffee  -Substance Abuse Treatment Hx: denies     FAMILY HISTORY  -Family Psych Hx: all 6 siblings: depression, anxiety. Mom: depression. Father: depression/undx bipolar  -Family Suicide Hx: denies  -Family Substance Abuse Hx: mom's side: struggled with alcoholism, cannabis, narcotics, cocaine, father: abused narcotics, cocaine. Sister: struggled with heroin use     SOCIAL HISTORY  -Upbringing: Grew up with both parents. Has 6 siblings  -income: denies financial struggles  -safety: feels safe at home/generally  -Support system: average support system  -Trauma: Mixed childhood/adulthood, poor memory of possible childhood trauma. Denies physical trauma  -Education: bachelors  -Work: compliance office  -Marital Status: , has current partner  -Children: none  -Living situation: lives alone, partner around 1/2 time/week  -: denies  -Legal: denies      MEDICAL HISTORY  -PCP:   -TBI/head trauma/LOC/seizure hx: mild head injury in 2013, fell down stairs and hit head     REVIEW OF SYSTEMS  Review of Systems   Constitutional:         Fibromyalgia & PCOS, MIKAELA (prescribed CPAP- still finding the right type of mask)   All other systems reviewed and are negative.       PHYSICAL EXAM  Physical Exam  Psychiatric:         Attention and Perception: Attention and perception normal.         Mood and Affect: Mood is anxious and depressed. Affect is flat.         Speech: Speech normal.         Behavior: Behavior normal. Behavior is cooperative.         Thought Content: Thought content normal.         Cognition and Memory: Cognition and memory normal.          Judgment: Judgment normal.          IMPRESSION  Sasha Longoria is a 34 y.o. female patient presents virtual [facility].  Patient reports mild improvement with symptoms relating to idiopathic hypersomnia, energy, motivation, and concentration since starting modafinil, but effectiveness decreased after the first few days of taking.  Has not noticed much improvement with depressive symptoms since starting Viibryd, but reports noticeable improvement with anxiety on buspirone.  Also reports personal life stress that seems to be precipitating symptoms.  Otherwise, denies mood swings or panic attacks.  Reports ongoing vivid dreams and flashbacks but denies nightmares.  Was briefly prescribed prazosin by previous psych provider, but stopped taking after engagement with behavioral sleep therapist, willing to retry prazosin.  Appetite is normal.  Denies substance use.  Denies any noticeable side effects from medications.  Denies SI/HI/AVH/delusions/shira/hypomania. Discussed to increase modafinil 200 mg daily to further improve idiopathic hypersomnia/fatigue/inattentiveness, increase Viibryd for anxiety, depression, and PTSD related symptoms, increase buspirone for anxiety.  Discussed to continue attending weekly counseling.  Will follow up with this provider in 3 weeks to continue monitoring, or sooner if needed.      Plan:     1. Reviewed diagnostic impression including subjective and objective data and provided education about NINA, MDD, PTSD, sleep disturbance, ADHD, panic attacks, bipolar disorder, and substance use/abuse, etiology, treatment recommendations including medication, therapy, course of treatment and prognosis. Patient amenable to treatment plan.     2. Safety Assessment: History of passive thoughts without intent/plan (2020), but denies current thoughts of SI, plan/intent.  No h/o SA. RF include , unmarried/single, living alone/lack of social support, history of trauma/abuse, chronic medical  illness, chronic pain, current psychiatric illness, feelings of hopelessness, and panic attacks. PF include strong coping skills, hopefulness/future orientation, and employment, engaged in care, future oriented, no guns.  Medium imminent risk     3. @  Problem List Items Addressed This Visit       PTSD (post-traumatic stress disorder)    Relevant Medications    vilazodone (Viibryd) 40 mg tablet    Generalized anxiety disorder    Relevant Medications    busPIRone (Buspar) 15 mg tablet    Depression    Relevant Medications    vilazodone (Viibryd) 40 mg tablet    Idiopathic hypersomnia    Relevant Medications    modafinil (Provigil) 200 mg tablet (Start on 4/28/2025)     Other Visit Diagnoses         Sleep disturbance        Relevant Medications    modafinil (Provigil) 200 mg tablet (Start on 4/28/2025)          INCREASE modafinil 200 mg daily   INCREASE Viibryd 40 mg daily   INCREASE Buspirone 15 mg BID  PENDING Prazosin 1 mg daily     Reviewed r/b/a, possible side effects of the medication. Client is aware about the benefit outweighs the risk.     4. INDIVIDUAL THERAPY: [facility], weekly (~4yrs)     5. OARRS: last filled Modafinil on 4/7/25, Gabapentin 300 mg on 12/12/2024 (270 caps x 90 days), Temazepam 7.5 mg on 11/11/2024      6. Labs reviewed    7. Last Urine Drug Screen  Date of Last Screen: 3/20/2025    8. Controlled Substance Agreement:  Date of the Last Agreement: 3/5/2025  Reviewed Controlled Substance Agreement including but not limited to the benefits, risks, and alternatives to treatment with a Controlled Substance medication(s).    9. Follow-up with this provider in 3 weeks.     10. -Total time: 29 minutes via virtual     - Follow up with physical health providers as scheduled  - May follow up sooner if experiences worsening symptoms by calling  Psychiatry at [phone]  - Patient verbalized an understanding to call [facility] at [phone] ([facility]), 211, or  911/go to the nearest emergency room if experiences thoughts of harm to self or others.

## 2025-04-24 ENCOUNTER — PHARMACY VISIT (OUTPATIENT)
Dept: PHARMACY | Facility: CLINIC | Age: 35
End: 2025-04-24
Payer: COMMERCIAL

## 2025-04-24 ENCOUNTER — APPOINTMENT (OUTPATIENT)
Dept: INTEGRATIVE MEDICINE | Facility: CLINIC | Age: 35
End: 2025-04-24
Payer: COMMERCIAL

## 2025-04-24 DIAGNOSIS — M99.01 CERVICAL SEGMENT DYSFUNCTION: ICD-10-CM

## 2025-04-24 DIAGNOSIS — G89.29 CHRONIC BILATERAL LOW BACK PAIN WITHOUT SCIATICA: ICD-10-CM

## 2025-04-24 DIAGNOSIS — M54.50 CHRONIC BILATERAL LOW BACK PAIN WITHOUT SCIATICA: ICD-10-CM

## 2025-04-24 DIAGNOSIS — M99.03 SOMATIC DYSFUNCTION OF LUMBAR REGION: ICD-10-CM

## 2025-04-24 DIAGNOSIS — M99.05 SOMATIC DYSFUNCTION OF PELVIS REGION: ICD-10-CM

## 2025-04-24 DIAGNOSIS — M54.40 CHRONIC LOW BACK PAIN WITH SCIATICA, SCIATICA LATERALITY UNSPECIFIED, UNSPECIFIED BACK PAIN LATERALITY: ICD-10-CM

## 2025-04-24 DIAGNOSIS — M54.2 NECK PAIN: ICD-10-CM

## 2025-04-24 DIAGNOSIS — M99.02 SOMATIC DYSFUNCTION OF THORACIC REGION: Primary | ICD-10-CM

## 2025-04-24 DIAGNOSIS — G89.29 CHRONIC LOW BACK PAIN WITH SCIATICA, SCIATICA LATERALITY UNSPECIFIED, UNSPECIFIED BACK PAIN LATERALITY: ICD-10-CM

## 2025-04-24 DIAGNOSIS — M79.10 MYALGIA: ICD-10-CM

## 2025-04-25 ENCOUNTER — APPOINTMENT (OUTPATIENT)
Dept: PHARMACY | Facility: HOSPITAL | Age: 35
End: 2025-04-25
Payer: COMMERCIAL

## 2025-04-28 DIAGNOSIS — G47.11 IDIOPATHIC HYPERSOMNIA: ICD-10-CM

## 2025-04-28 DIAGNOSIS — G47.9 SLEEP DISTURBANCE: ICD-10-CM

## 2025-04-28 RX ORDER — MODAFINIL 200 MG/1
200 TABLET ORAL DAILY
Qty: 30 TABLET | Refills: 1 | Status: SHIPPED | OUTPATIENT
Start: 2025-04-28 | End: 2025-06-27

## 2025-04-29 ENCOUNTER — APPOINTMENT (OUTPATIENT)
Dept: NEUROSURGERY | Facility: CLINIC | Age: 35
End: 2025-04-29
Payer: COMMERCIAL

## 2025-04-29 ENCOUNTER — APPOINTMENT (OUTPATIENT)
Dept: INTEGRATIVE MEDICINE | Facility: CLINIC | Age: 35
End: 2025-04-29
Payer: COMMERCIAL

## 2025-04-30 ENCOUNTER — APPOINTMENT (OUTPATIENT)
Dept: INTEGRATIVE MEDICINE | Facility: CLINIC | Age: 35
End: 2025-04-30
Payer: COMMERCIAL

## 2025-04-30 ASSESSMENT — ENCOUNTER SYMPTOMS
AGITATION: 0
DIARRHEA: 0
DYSURIA: 0
DIZZINESS: 0
FEVER: 0
VOMITING: 0
NAUSEA: 0
SHORTNESS OF BREATH: 0
DIFFICULTY URINATING: 0
FATIGUE: 1
COLOR CHANGE: 0

## 2025-04-30 NOTE — PROGRESS NOTES
Assessment/Plan   Today's presentation is consistent with fibromyalgia/myofascial pain syndrome alongside postural strain and somatic dysfunction contributing to her pain syndrome, also has been diagnosed with inflammatory arthritis (not rheumatoid), PCOS, and has had elevated ESR/CRP and been under rheumatologic care.  Complicating factors include chronicity of symptoms as well as body habitus.  We will implement a trial of care to include various manipulative techniques which will range from instrument assisted to diversified.  We will utilize soft tissue techniques such as active release technique to the cervical spine musculature.  We will closely monitor their response to care to determine if any changes need to occur, if advanced imaging is needed, or if referral to other providers is appropriate. She will also be receiving acupuncture and integrative medicine consultation which is appropriate when considering her overall presentation     Full spine EOS radiographs 2024 - 12 degree dextroconvex scoliosis from T11 to L3. Hyperkyphosis in TS and hyperlordosis in LS    Brain MRI 2024 - IMPRESSION:  1. No MR evidence of acute intracranial infarct, hemorrhage, mass, or  mass effect.  2. Nonspecific 6 mm white matter FLAIR signal hyperintensity adjacent  to the trigone of the right lateral ventricle, which may be  indicative of minimal infectious inflammatory change, migraine  disorder, demyelinating disease, or vasculitis for instance in the  appropriate clinical context. Consider follow-up exam if clinically  indicated.    LS radiographs 2024 - IMPRESSION:  No acute pathologic findings are identified.    CS radiographs 2024 - IMPRESSION:  No pathologic findings are identified.    TS radiographs 2024 - IMPRESSION:   No acute pathologic findings are identified.  Thoracic lumbar dextroscoliosis.  Lower thoracic mild spondylosis.    Visits this year: 9    Subjective   Patient endorses moderate constant neck, low back  pain and moderate headache. No radiating symptoms into extremities. Dose of sulfasalazine increased back a bit, now taking half of what she was typically -- thru follow-up with rheumatology. Some stress apart from health -- issues with cat's / pet's health. Notes tightness/pressure in neck. Has upcoming massage    HPI - 10/05/23 (CR): the patient presents today per the referral of Dr. Suárez and pain management for evaluation and and management of chronic full spine complaints. She has in the past been diagnosed with fibromyalgia. She has dealt with pain for several years and has received chiropractic care in the past. She did have mixed results with chiropractic care in the past, reporting a variation of instrument assisted manipulation and manual manipulation. But she wished to attend chiropractic care again through a hospital-based provider. She is under the care of pain management and will be starting infusions next week. She is also on the wait list for infusions at [facility] if needed. Overall, her pain levels remain moderate to severe. Her pain is constant. Symptoms in the lower back and hips seem to be the area of most intensity. But she does continue to experience pain throughout the spine. She experiences paresthesias in the upper and lower extremities bilaterally. She has difficulty finding any comfortable positions     Review of Systems   Constitutional:  Positive for fatigue. Negative for fever.   Eyes:  Negative for visual disturbance.   Respiratory:  Negative for shortness of breath.    Cardiovascular:  Negative for chest pain.   Gastrointestinal:  Negative for diarrhea, nausea and vomiting.   Genitourinary:  Negative for difficulty urinating and dysuria.   Skin:  Negative for color change.   Neurological:  Negative for dizziness.   Psychiatric/Behavioral:  Negative for agitation.    All other systems reviewed and are negative.    Objective   Examination findings (e.g., palpation & ROM):  Decreased C/S and L/S ROM with pain, hypertonic and tender cervical and lumbar erectors, BL upper trapezius  SLR BL 70    Segmental joint dysfunction was identified in the following areas using motion palpation and/or pain provocation assessment:  Cervical: 2  Thoracic: 7-10  Lumbopelvic: 1-3, BL SIJ      Physical Exam  Neurological:      General: No focal deficit present.      Mental Status: She is alert.      Motor: Motor function is intact.      Coordination: Coordination is intact.      Gait: Gait is intact.      Deep Tendon Reflexes: Reflexes are normal and symmetric.      Reflex Scores:       Tricep reflexes are 2+ on the right side and 2+ on the left side.       Bicep reflexes are 2+ on the right side and 2+ on the left side.       Brachioradialis reflexes are 2+ on the right side and 2+ on the left side.       Patellar reflexes are 2+ on the right side and 2+ on the left side.       Achilles reflexes are 2+ on the right side and 2+ on the left side.     Comments: Trace Jaylin's BL  4/15/25       Plan   Today's treatment:  SMT to regions of segmental dysfunction identified on exam, using age-appropriate force, and manual diversified technique.   Mobilization on CS  STM to patient tolerance to hypertonic paraspinal muscles, upper trapezius BL  Manual dynamic stretching of the bl gluteal mm & hamstrings  1:41 to 1:57 PM  Patient noted improved mobility and reduced pain post-treatment    Treatment Plan:   The patient and I discussed the risks and benefits of chiropractic care. Based on the patient's subjective complaints along with the examination findings, it is advised that a course of chiropractic treatment be initiated. The patient provided consent for care. The patient tolerated today's treatment with little or no additional discomfort and was instructed to contact the office for questions or concerns. Will see patient once per week then every 2 weeks when symptoms become mild/manageable, further spaced  apart contingent upon improvement.     This chart note was generated using dictation software, and as such, there may be typographical errors present. Abbreviations: Cervical spine (CS), cervical-thoracic (CT), Dry needling (DN), Flexion adduction internal rotation (FAIR), high velocity, low amplitude (HVLA), Lumbar spine (LS), Soft tissue manipulation (STM), spinal manipulative therapy (SMT), Straight leg raise (SLR), Thoracic spine (TS).

## 2025-05-01 ENCOUNTER — APPOINTMENT (OUTPATIENT)
Dept: PRIMARY CARE | Facility: CLINIC | Age: 35
End: 2025-05-01
Payer: COMMERCIAL

## 2025-05-01 ENCOUNTER — APPOINTMENT (OUTPATIENT)
Dept: INTEGRATIVE MEDICINE | Facility: CLINIC | Age: 35
End: 2025-05-01
Payer: COMMERCIAL

## 2025-05-01 VITALS
BODY MASS INDEX: 46.82 KG/M2 | DIASTOLIC BLOOD PRESSURE: 84 MMHG | HEART RATE: 103 BPM | RESPIRATION RATE: 18 BRPM | HEIGHT: 61 IN | WEIGHT: 248 LBS | TEMPERATURE: 97.7 F | SYSTOLIC BLOOD PRESSURE: 140 MMHG | OXYGEN SATURATION: 96 %

## 2025-05-01 DIAGNOSIS — F41.1 GENERALIZED ANXIETY DISORDER: ICD-10-CM

## 2025-05-01 DIAGNOSIS — Z00.00 ANNUAL PHYSICAL EXAM: Primary | ICD-10-CM

## 2025-05-01 DIAGNOSIS — M54.2 NECK PAIN: ICD-10-CM

## 2025-05-01 DIAGNOSIS — M99.02 SOMATIC DYSFUNCTION OF THORACIC REGION: Primary | ICD-10-CM

## 2025-05-01 DIAGNOSIS — M54.50 CHRONIC BILATERAL LOW BACK PAIN WITHOUT SCIATICA: ICD-10-CM

## 2025-05-01 DIAGNOSIS — M99.03 SOMATIC DYSFUNCTION OF LUMBAR REGION: ICD-10-CM

## 2025-05-01 DIAGNOSIS — M99.05 SOMATIC DYSFUNCTION OF PELVIS REGION: ICD-10-CM

## 2025-05-01 DIAGNOSIS — G89.29 CHRONIC BILATERAL LOW BACK PAIN WITHOUT SCIATICA: ICD-10-CM

## 2025-05-01 DIAGNOSIS — F43.10 PTSD (POST-TRAUMATIC STRESS DISORDER): ICD-10-CM

## 2025-05-01 DIAGNOSIS — M99.01 CERVICAL SEGMENT DYSFUNCTION: ICD-10-CM

## 2025-05-01 DIAGNOSIS — M79.7 FIBROMYALGIA: ICD-10-CM

## 2025-05-01 DIAGNOSIS — G47.33 OSA ON CPAP: ICD-10-CM

## 2025-05-01 PROCEDURE — 99395 PREV VISIT EST AGE 18-39: CPT | Performed by: NURSE PRACTITIONER

## 2025-05-01 PROCEDURE — 1036F TOBACCO NON-USER: CPT | Performed by: NURSE PRACTITIONER

## 2025-05-01 PROCEDURE — 3008F BODY MASS INDEX DOCD: CPT | Performed by: NURSE PRACTITIONER

## 2025-05-01 RX ORDER — SULFASALAZINE 500 MG/1
1500 TABLET, DELAYED RELEASE ORAL DAILY
COMMUNITY

## 2025-05-01 ASSESSMENT — ENCOUNTER SYMPTOMS
WOUND: 0
WEAKNESS: 0
SLEEP DISTURBANCE: 1
HEMATOLOGIC/LYMPHATIC NEGATIVE: 1
NERVOUS/ANXIOUS: 0
BACK PAIN: 1
CARDIOVASCULAR NEGATIVE: 1
ENDOCRINE NEGATIVE: 1
MYALGIAS: 1
CONFUSION: 0
DIZZINESS: 0
GASTROINTESTINAL NEGATIVE: 1
CONSTITUTIONAL NEGATIVE: 1
ARTHRALGIAS: 1
LIGHT-HEADEDNESS: 0
ALLERGIC/IMMUNOLOGIC NEGATIVE: 1
RESPIRATORY NEGATIVE: 1
EYES NEGATIVE: 1
FACIAL ASYMMETRY: 0
NEUROLOGICAL NEGATIVE: 1
POLYPHAGIA: 0

## 2025-05-01 ASSESSMENT — PROMIS GLOBAL HEALTH SCALE
RATE_GENERAL_HEALTH: FAIR
RATE_SOCIAL_SATISFACTION: FAIR
RATE_AVERAGE_FATIGUE: SEVERE
RATE_AVERAGE_PAIN: 7
RATE_QUALITY_OF_LIFE: FAIR
EMOTIONAL_PROBLEMS: OFTEN
CARRYOUT_SOCIAL_ACTIVITIES: GOOD
RATE_PHYSICAL_HEALTH: FAIR
CARRYOUT_PHYSICAL_ACTIVITIES: MODERATELY
RATE_MENTAL_HEALTH: GOOD

## 2025-05-01 ASSESSMENT — PAIN SCALES - GENERAL: PAINLEVEL_OUTOF10: 0-NO PAIN

## 2025-05-01 ASSESSMENT — PATIENT HEALTH QUESTIONNAIRE - PHQ9
1. LITTLE INTEREST OR PLEASURE IN DOING THINGS: NOT AT ALL
SUM OF ALL RESPONSES TO PHQ9 QUESTIONS 1 AND 2: 0
2. FEELING DOWN, DEPRESSED OR HOPELESS: NOT AT ALL

## 2025-05-01 NOTE — PROGRESS NOTES
"Subjective   Patient ID: Sasha Longoria is a 34 y.o. female who presents for Annual Exam (Patient here for annual physical exam today).    HPI  Sasha is here for annual  exam   On zepbound for weight loss   Doing well has lost > 100 since prior to starting   She is not tolerating her Cpap well   Following with sleep medicine.   Does report chronic insomnia due PTSD   Moving bowel ad bladder ok;   Has chronic eczema in vaginal wall; will FU with GYN   And now worsening \" period cramps\" does not get period due IUD   Had colonoscopy FU 10 yrs    Has chronic GERD: off PPI and doing ok   Has chronic back pain saw neurology: needs PT unable to complete due appointments     Following with Psychiatric for anxiety/ depression had GENE sight testing   Follows with therapy well     Review of Systems   Constitutional: Negative.    HENT: Negative.     Eyes: Negative.    Respiratory: Negative.     Cardiovascular: Negative.    Gastrointestinal: Negative.    Endocrine: Negative.  Negative for polyphagia.   Genitourinary: Negative.    Musculoskeletal:  Positive for arthralgias, back pain and myalgias.   Skin: Negative.  Negative for pallor, rash and wound.   Allergic/Immunologic: Negative.    Neurological: Negative.  Negative for dizziness, facial asymmetry, weakness and light-headedness.   Hematological: Negative.    Psychiatric/Behavioral:  Positive for sleep disturbance. Negative for confusion and suicidal ideas. The patient is not nervous/anxious.    All other systems reviewed and are negative.      Objective   /84   Pulse 103   Temp 36.5 °C (97.7 °F)   Resp 18   Ht 1.549 m (5' 1\")   Wt 112 kg (248 lb)   SpO2 96%   BMI 46.86 kg/m²     Physical Exam    Assessment/Plan   1. Annual physical exam (Primary)    2. Generalized anxiety disorder  Continue meds   And FU with therapy     3. PTSD (post-traumatic stress disorder)  Continue meds   And FU with therapy     4. Fibromyalgia  Consider with [facility]     5. MIKAELA " on CPAP  Continue with sleep medicine

## 2025-05-02 ENCOUNTER — HOSPITAL ENCOUNTER (OUTPATIENT)
Dept: RADIOLOGY | Facility: CLINIC | Age: 35
Discharge: HOME | End: 2025-05-02
Payer: COMMERCIAL

## 2025-05-02 DIAGNOSIS — R93.0 ABNORMAL MRI OF HEAD: ICD-10-CM

## 2025-05-02 PROCEDURE — 70553 MRI BRAIN STEM W/O & W/DYE: CPT

## 2025-05-02 PROCEDURE — A9575 INJ GADOTERATE MEGLUMI 0.1ML: HCPCS | Mod: JZ | Performed by: NURSE PRACTITIONER

## 2025-05-02 PROCEDURE — 2550000001 HC RX 255 CONTRASTS: Mod: JZ | Performed by: NURSE PRACTITIONER

## 2025-05-02 RX ORDER — GADOTERATE MEGLUMINE 376.9 MG/ML
20 INJECTION INTRAVENOUS
Status: COMPLETED | OUTPATIENT
Start: 2025-05-02 | End: 2025-05-02

## 2025-05-02 RX ADMIN — GADOTERATE MEGLUMINE 20 ML: 376.9 INJECTION INTRAVENOUS at 13:46

## 2025-05-06 ENCOUNTER — APPOINTMENT (OUTPATIENT)
Dept: OBSTETRICS AND GYNECOLOGY | Facility: CLINIC | Age: 35
End: 2025-05-06
Payer: COMMERCIAL

## 2025-05-06 DIAGNOSIS — M79.7 FIBROMYALGIA: Primary | ICD-10-CM

## 2025-05-06 RX ORDER — KETOROLAC TROMETHAMINE 30 MG/ML
30 INJECTION, SOLUTION INTRAMUSCULAR; INTRAVENOUS ONCE
Status: CANCELLED | OUTPATIENT
Start: 2025-05-08 | End: 2025-05-08

## 2025-05-06 RX ORDER — HEPARIN 100 UNIT/ML
5 SYRINGE INTRAVENOUS AS NEEDED
Status: CANCELLED | OUTPATIENT
Start: 2025-05-08

## 2025-05-06 ASSESSMENT — ENCOUNTER SYMPTOMS
ANOREXIA: 0
NAUSEA: 1
DIARRHEA: 0
HEADACHES: 1
DYSURIA: 0
ARTHRALGIAS: 1
VOMITING: 0
MYALGIAS: 1
BELCHING: 1
FEVER: 0
HEMATURIA: 0
FREQUENCY: 1
FLATUS: 1
HEMATOCHEZIA: 0
ABDOMINAL PAIN: 1
CONSTIPATION: 1
WEIGHT LOSS: 0

## 2025-05-06 ASSESSMENT — PATIENT GLOBAL ASSESSMENT (PGA): WHAT IS THE PGA: PATIENT GLOBAL ASSESSMENT:  2 - MILD

## 2025-05-06 ASSESSMENT — DERMATOLOGY QUALITY OF LIFE (QOL) ASSESSMENT
RATE HOW BOTHERED YOU ARE BY EFFECTS OF YOUR SKIN PROBLEMS ON YOUR ACTIVITIES (EG, GOING OUT, ACCOMPLISHING WHAT YOU WANT, WORK ACTIVITIES OR YOUR RELATIONSHIPS WITH OTHERS): 1
RATE HOW EMOTIONALLY BOTHERED YOU ARE BY YOUR SKIN PROBLEM (FOR EXAMPLE, WORRY, EMBARRASSMENT, FRUSTRATION): 5
WHAT SINGLE SKIN CONDITION LISTED BELOW IS THE PATIENT ANSWERING THE QUALITY-OF-LIFE ASSESSMENT QUESTIONS ABOUT: NONE OF THE ABOVE
RATE HOW BOTHERED YOU ARE BY SYMPTOMS OF YOUR SKIN PROBLEM (EG, ITCHING, STINGING BURNING, HURTING OR SKIN IRRITATION): 0 - NEVER BOTHERED

## 2025-05-07 ENCOUNTER — TELEMEDICINE (OUTPATIENT)
Dept: NEUROLOGY | Facility: CLINIC | Age: 35
End: 2025-05-07
Payer: COMMERCIAL

## 2025-05-07 DIAGNOSIS — G43.009 MIGRAINE WITHOUT AURA AND WITHOUT STATUS MIGRAINOSUS, NOT INTRACTABLE: Primary | ICD-10-CM

## 2025-05-07 PROCEDURE — 99214 OFFICE O/P EST MOD 30 MIN: CPT | Performed by: NURSE PRACTITIONER

## 2025-05-07 RX ORDER — FREMANEZUMAB-VFRM 225 MG/1.5ML
225 INJECTION SUBCUTANEOUS
Qty: 1.5 ML | Refills: 2 | Status: SHIPPED | OUTPATIENT
Start: 2025-05-07

## 2025-05-07 NOTE — PROGRESS NOTES
Virtual or Telephone Consent    An interactive audio and video telecommunication system which permits real time communications between the patient (at the originating site) and provider (at the distant site) was utilized to provide this telehealth service.   Verbal consent was requested and obtained from Sasha Longoria on this date, 05/07/25 for a telehealth visit and the patient's location was confirmed at the time of the visit.      Subjective   HPI  Sasha Longoria is a 34 y.o. year old female who presents with chief complaint Migraine without aura and without status migrainosus, not intractable [G43.009]    Sasha is experiencing nearly daily headache activity.  She reports having increased stress levels.  Whether its good stress or negative stress is still triggers migraine activity.  She is working with her therapist and psychiatrist and has undergone several medication changes.  Continues to get ketamine infusions for her fibromyalgia.  Patient unsure of efficacy of Ajovy because of all the other medication changes she has had.  She is behind on her Botox due to the inability to keep up with all of her different appointments.  She does have a Botox appointment scheduled for next month.  That will hopefully get us back on track.  The patient denies the presence of any associated double vision, speech problems, confusion, facial or extremity weakness or numbness or problems with coordination. Denies other neurological history of seizures, stroke, or TIA.        Current/ past HA treatments:  Preventive:  Topiramate  Propranolol  Gabapentin  Duloxetine  Amitriptyline  Ajovy  Ketamine    Rescue:  Acetaminophen  Ibuprofen    Current Medications[1]    RX Allergies[2]    Medical History[3]  Surgical History[4]  Family History[5]  Social History     Tobacco Use    Smoking status: Never    Smokeless tobacco: Never   Substance Use Topics    Alcohol use: Yes     Alcohol/week: 1.0 standard drink of alcohol      Types: 1 Standard drinks or equivalent per week     Comment: Only drink socially and not on every social occasion.     Social History     Substance and Sexual Activity   Drug Use Not Currently    Frequency: 4.0 times per week    Types: Marijuana    Comment: I try thc in different forms for pain.        ROS  As noted in HPI, otherwise all other systems have been reviewed are negative for complaint.     Objective   General Appearance: Sasha is well-developed, well-nourished, 34 y.o. year old female, in no acute distress. Makes good eye contact, is alert, interactive, and cooperative. Demonstrates recent & remote memory recall. Subjective information consistent with objective assessment.       Lab Results   Component Value Date    WBC 9.4 03/04/2025    RBC 3.84 03/04/2025    HGB 12.3 03/04/2025    HCT 38.8 03/04/2025     03/04/2025     11/07/2024    K 4.4 11/07/2024     11/07/2024    BUN 11 11/07/2024    CREATININE 0.82 11/07/2024    EGFR >90 11/07/2024    CALCIUM 8.8 11/07/2024    ALKPHOS 77 11/07/2024    AST 12 11/07/2024    ALT 16 11/07/2024    QUTUAMPB69 813 03/04/2025    VITD25 88 03/04/2025    HGBA1C 4.6 11/07/2024    LDLCALC 82 11/07/2024    CHOL 139 11/07/2024    HDL 40.4 11/07/2024    TRIG 82 11/07/2024    TSH 2.42 11/07/2024        RECORDS REVIEW:  Previous records (provider notes, laboratory reports, and imaging studies were reviewed and summarized).    Assessment & Plan  Migraine without aura and without status migrainosus, not intractable              ASSESSMENT/PLAN:  Continue Ajovy for preventative treatment.  Resume Botox for preventative treatment.  Follow-up after adjustments have been completed with her other medications.        Farida Freeman, APRN-CNP         [1]   Current Outpatient Medications:     alpha lipoic acid 600 mg capsule, Take by mouth. 1 capsule daily after meal 1 week Then 2 capsules for 1 week Then 3 capsules for 1 week Then 4 capsules daily., Disp: , Rfl:      busPIRone (Buspar) 15 mg tablet, Take 1 tablet (15 mg) by mouth 2 times a day., Disp: 60 tablet, Rfl: 1    cholecalciferol (Vitamin D-3) 25 MCG (1000 UT) capsule, Take 2 capsules (50 mcg) by mouth once daily., Disp: , Rfl:     coenzyme Q-10 (Co Q-10) 200 mg capsule, Take 1 capsule (200 mg) by mouth 3 times a day., Disp: , Rfl:     dihydroergotamine (Trudhesa) 0.725 mg/pump act. (4 mg/mL) nasal spray, Administer 1 spray into each nostril every 1 hour if needed for migraine. Dose may be repeated in 1 hour after the first dose. No more than 2 doses within 24 hours or 3 doses within 7 days., Disp: 6 each, Rfl: 3    ferrous sulfate, 325 mg ferrous sulfate, tablet, Take 1 tablet (325 mg) by mouth once daily with breakfast., Disp: 48 tablet, Rfl: 3    fremanezumab (Ajovy Autoinjector) 225 mg/1.5 mL auto-injector, Inject 1 Pen (225 mg) under the skin every 28 (twenty-eight) days., Disp: 1.5 mL, Rfl: 2    Gas Relief, simethicone, 80 mg chewable tablet, CHEW 1 TABLET (80 MG) EVERY 6 HOURS IF NEEDED FOR FLATULENCE., Disp: , Rfl:     hydrocortisone 2.5 % ointment, APPLY TO AFFECTED AREAS UNDER THE BREAST TWICE DAILY M-F. TAKE WEEKENDS OFF., Disp: , Rfl:     imiquimod (Aldara) 5 % cream, APPLY A THIN LAYER TO THE AFFECTED AREAS OF THE BODY 3 NIGHTS PER WEEK. WASH HANDS AFTER EACH USE., Disp: , Rfl:     ketoconazole (NIZOral) 2 % shampoo, Lather onto scalp and let sit for 5 mins before rinsing once daily until improvement.  May decrease to once-twice weekly for maintenance., Disp: , Rfl:     levonorgestrel (Mirena) 21 mcg/24 hours (8 yrs) 52 mg IUD, by intrauterine route., Disp: , Rfl:     MAGNESIUM GLYCINATE ORAL, Take 1 capsule by mouth once daily., Disp: , Rfl:     modafinil (Provigil) 200 mg tablet, Take 1 tablet (200 mg) by mouth once daily., Disp: 30 tablet, Rfl: 1    norethindrone (Aygestin) 5 mg tablet, Take 1 tablet (5 mg) by mouth once daily., Disp: 90 tablet, Rfl: 3    ondansetron (Zofran) 4 mg tablet, Take 2 tablets  (8 mg) by mouth every 8 hours if needed for nausea or vomiting., Disp: 20 tablet, Rfl: 1    sulfaSALAzine (Azulfidine) 500 mg DR tablet, Take 3 tablets (1,500 mg) by mouth once daily. Do not crush, chew, or split., Disp: , Rfl:     tirzepatide, weight loss, (Zepbound) 7.5 mg/0.5 mL injection, Inject 7.5 mg under the skin every 7 days., Disp: 12 each, Rfl: 3    vilazodone (Viibryd) 40 mg tablet, Take 1 tablet (40 mg) by mouth once daily., Disp: 30 tablet, Rfl: 1    Current Facility-Administered Medications:     heparin flush 100 unit/mL syringe 500 Units, 500 Units, intra-catheter, PRN, Dre Justice MD, 500 Units at 25 1012    heparin lock flush (porcine) 10 unit/mL injection 10 Units, 10 Units, intra-catheter, Once, Dre Justice MD  [2] No Known Allergies  [3]   Past Medical History:  Diagnosis Date    Abnormal Pap smear of cervix     Anxiety     Arthritis ~     Chronic pain disorder     Depression     Eczema ~     Fibromyalgia, primary     GERD (gastroesophageal reflux disease) ~    Headache     Headache, tension-type     HPV (human papilloma virus) infection     Joint pain     Low back pain     Memory loss     Migraine     Neck pain     Neuromuscular disorder (Multi)     Numbness     Obstructive sleep apnea     Polycystic ovary syndrome     Scoliosis ~    Visual impairment ~   [4]   Past Surgical History:  Procedure Laterality Date    OTHER SURGICAL HISTORY  2019    Tonsillectomy with adenoidectomy    OTHER SURGICAL HISTORY  2022    No history of surgery    TONSILLECTOMY     [5]   Family History  Problem Relation Name Age of Onset    Breast cancer Paternal Grandmother      Arthritis Mother Haylee     Depression Mother Haylee     Diabetes Mother Haylee     Heart disease Mother Haylee     Hypertension Mother Haylee     Learning disabilities Mother Haylee     Intellectual Disability Mother Haylee     Miscarriages / Stillbirths Mother Haylee      labor Mother Haylee      Depression Father Lowell     Diabetes Father Lowell     Drug abuse Father Lowell     Hypertension Father Lowell     Mental illness Father Lowell     Alcohol abuse Maternal Grandfather Tera     Depression Maternal Grandfather Tera     Drug abuse Maternal Grandfather Tera     Heart disease Maternal Grandfather Tera     Stroke Maternal Grandfather Tera     Cancer Maternal Grandmother Carri     Depression Maternal Grandmother Carri     Diabetes Maternal Grandmother Carri     Depression Paternal Grandfather Whit     Heart disease Paternal Grandfather Whit     Hypertension Paternal Grandfather Whit     Migraines Paternal Grandfather Whit     Cancer Sister Amina     Depression Sister Amina     Cancer Sister Cayla     Depression Sister Cayla     Drug abuse Sister Cayla     Hypertension Sister Cayla     Miscarriages / Stillbirths Sister Cayla     Depression Sister Kenzie     Depression Sister Marai L     Depression Brother Chris     Drug abuse Mother's Brother Tera     Learning disabilities Brother Crow     Intellectual Disability Brother Crow

## 2025-05-08 ENCOUNTER — INFUSION (OUTPATIENT)
Dept: INFUSION THERAPY | Facility: CLINIC | Age: 35
End: 2025-05-08
Payer: COMMERCIAL

## 2025-05-08 ENCOUNTER — TELEPHONE (OUTPATIENT)
Dept: INFUSION THERAPY | Facility: CLINIC | Age: 35
End: 2025-05-08

## 2025-05-08 ENCOUNTER — SPECIALTY PHARMACY (OUTPATIENT)
Dept: PHARMACY | Facility: CLINIC | Age: 35
End: 2025-05-08

## 2025-05-08 VITALS
HEART RATE: 90 BPM | RESPIRATION RATE: 16 BRPM | DIASTOLIC BLOOD PRESSURE: 75 MMHG | TEMPERATURE: 97.9 F | OXYGEN SATURATION: 97 % | SYSTOLIC BLOOD PRESSURE: 133 MMHG

## 2025-05-08 DIAGNOSIS — M79.7 FIBROMYALGIA: ICD-10-CM

## 2025-05-08 LAB — PREGNANCY TEST URINE, POC: NEGATIVE

## 2025-05-08 PROCEDURE — 96365 THER/PROPH/DIAG IV INF INIT: CPT | Mod: INF

## 2025-05-08 PROCEDURE — 2500000004 HC RX 250 GENERAL PHARMACY W/ HCPCS (ALT 636 FOR OP/ED): Performed by: NURSE PRACTITIONER

## 2025-05-08 PROCEDURE — 96375 TX/PRO/DX INJ NEW DRUG ADDON: CPT | Mod: INF

## 2025-05-08 PROCEDURE — 96368 THER/DIAG CONCURRENT INF: CPT | Mod: INF

## 2025-05-08 PROCEDURE — 81025 URINE PREGNANCY TEST: CPT

## 2025-05-08 RX ORDER — NITROGLYCERIN 0.4 MG/1
0.4 TABLET SUBLINGUAL ONCE
OUTPATIENT
Start: 2025-05-22 | End: 2025-05-22

## 2025-05-08 RX ORDER — FAMOTIDINE 10 MG/ML
20 INJECTION, SOLUTION INTRAVENOUS ONCE AS NEEDED
OUTPATIENT
Start: 2025-05-22

## 2025-05-08 RX ORDER — ONDANSETRON HYDROCHLORIDE 2 MG/ML
4 INJECTION, SOLUTION INTRAVENOUS ONCE
Status: COMPLETED | OUTPATIENT
Start: 2025-05-08 | End: 2025-05-08

## 2025-05-08 RX ORDER — HEPARIN 100 UNIT/ML
5 SYRINGE INTRAVENOUS AS NEEDED
Status: DISCONTINUED | OUTPATIENT
Start: 2025-05-08 | End: 2025-05-08 | Stop reason: HOSPADM

## 2025-05-08 RX ORDER — ALBUTEROL SULFATE 0.83 MG/ML
3 SOLUTION RESPIRATORY (INHALATION) AS NEEDED
OUTPATIENT
Start: 2025-05-22

## 2025-05-08 RX ORDER — EPINEPHRINE 0.3 MG/.3ML
0.3 INJECTION SUBCUTANEOUS EVERY 5 MIN PRN
OUTPATIENT
Start: 2025-05-22

## 2025-05-08 RX ORDER — METOPROLOL TARTRATE 25 MG/1
25 TABLET, FILM COATED ORAL ONCE
OUTPATIENT
Start: 2025-05-22 | End: 2025-05-22

## 2025-05-08 RX ORDER — HEPARIN 100 UNIT/ML
5 SYRINGE INTRAVENOUS AS NEEDED
OUTPATIENT
Start: 2025-05-22

## 2025-05-08 RX ORDER — METOCLOPRAMIDE HYDROCHLORIDE 5 MG/ML
10 INJECTION INTRAMUSCULAR; INTRAVENOUS ONCE
OUTPATIENT
Start: 2025-05-22 | End: 2025-05-22

## 2025-05-08 RX ORDER — DIPHENHYDRAMINE HYDROCHLORIDE 50 MG/ML
50 INJECTION, SOLUTION INTRAMUSCULAR; INTRAVENOUS AS NEEDED
OUTPATIENT
Start: 2025-05-22

## 2025-05-08 RX ORDER — DIPHENHYDRAMINE HYDROCHLORIDE 50 MG/ML
25 INJECTION, SOLUTION INTRAMUSCULAR; INTRAVENOUS ONCE
OUTPATIENT
Start: 2025-05-22 | End: 2025-05-22

## 2025-05-08 RX ORDER — KETOROLAC TROMETHAMINE 30 MG/ML
30 INJECTION, SOLUTION INTRAMUSCULAR; INTRAVENOUS ONCE
Status: COMPLETED | OUTPATIENT
Start: 2025-05-08 | End: 2025-05-08

## 2025-05-08 RX ORDER — KETOROLAC TROMETHAMINE 30 MG/ML
30 INJECTION, SOLUTION INTRAMUSCULAR; INTRAVENOUS ONCE
OUTPATIENT
Start: 2025-05-22 | End: 2025-05-22

## 2025-05-08 RX ORDER — ONDANSETRON HYDROCHLORIDE 2 MG/ML
4 INJECTION, SOLUTION INTRAVENOUS ONCE
OUTPATIENT
Start: 2025-05-22 | End: 2025-05-22

## 2025-05-08 RX ADMIN — KETOROLAC TROMETHAMINE 30 MG: 30 INJECTION, SOLUTION INTRAMUSCULAR at 08:19

## 2025-05-08 RX ADMIN — Medication: at 08:19

## 2025-05-08 RX ADMIN — PROPOFOL 100 MG: 10 INJECTION, EMULSION INTRAVENOUS at 08:19

## 2025-05-08 RX ADMIN — HEPARIN 500 UNITS: 100 SYRINGE at 09:22

## 2025-05-08 RX ADMIN — ONDANSETRON 4 MG: 2 INJECTION INTRAMUSCULAR; INTRAVENOUS at 08:49

## 2025-05-08 ASSESSMENT — PAIN SCALES - GENERAL
PAINLEVEL_OUTOF10: 0 - NO PAIN
PAINLEVEL_OUTOF10: 9

## 2025-05-08 ASSESSMENT — PATIENT HEALTH QUESTIONNAIRE - PHQ9
10. IF YOU CHECKED OFF ANY PROBLEMS, HOW DIFFICULT HAVE THESE PROBLEMS MADE IT FOR YOU TO DO YOUR WORK, TAKE CARE OF THINGS AT HOME, OR GET ALONG WITH OTHER PEOPLE: SOMEWHAT DIFFICULT
SUM OF ALL RESPONSES TO PHQ9 QUESTIONS 1 AND 2: 1
1. LITTLE INTEREST OR PLEASURE IN DOING THINGS: SEVERAL DAYS
2. FEELING DOWN, DEPRESSED OR HOPELESS: NOT AT ALL

## 2025-05-08 ASSESSMENT — PAIN DESCRIPTION - DESCRIPTORS: DESCRIPTORS: ACHING

## 2025-05-08 ASSESSMENT — ENCOUNTER SYMPTOMS
OCCASIONAL FEELINGS OF UNSTEADINESS: 0
LOSS OF SENSATION IN FEET: 1
DEPRESSION: 0

## 2025-05-08 ASSESSMENT — PAIN - FUNCTIONAL ASSESSMENT: PAIN_FUNCTIONAL_ASSESSMENT: 0-10

## 2025-05-08 NOTE — PATIENT INSTRUCTIONS
[facility]      Pain Infusion Aftercare Instructions      1. It is normal to feel sedated, tired and low in energy after a pain infusion. DO NOT DRIVE, OPERATE ANY MACHINERY, OR MAKE ANY IMPORTANT DECISIONS FOR AT LEAST 24 HOURS AFTER THE INFUSION.     2. Call the pain center at [phone] with any problems, questions, or concerns.     3. Eat light after the infusion. If you feel queasy or sick to your stomach, laying down with your eyes closed may help. When you resume eating start with something mild like clear liquids, yogurt, applesauce, crackers, etc… Gradually advance to a regular diet.     4. Do not leave your house alone the evening of your pain infusion.     5. No alcohol or sedative medications, such as sleeping pills, for 24 hours after your pain infusion.     6. Resume all other prescribed medications unless directed otherwise by you physician.     7. If you have any medical emergencies, call 911 or go directly to the closest emergency room.Today :We administered (ketamine 30 mg, lidocaine (Xylocaine) 300 mg in sodium chloride 0.9% 518 mL IV), propofol (Diprivan) 100 mg in dextrose 5% 25 mL IV, ketorolac, and ondansetron.     For:   1. Fibromyalgia         Your next appointment is due in:  see printed appointment sheet        Please read the  Medication Guide that was given to you and reviewed during todays visit.     (Tell all doctors including dentists that you are taking this medication)     Go to the emergency room or call 911 if:  -You have signs of allergic reaction:   -Rash, hives, itching.   -Swollen, blistered, peeling skin.   -Swelling of face, lips, mouth, tongue or throat.   -Tightness of chest, trouble breathing, swallowing or talking     Call your doctor:  - If IV / injection site gets red, warm, swollen, itchy or leaks fluid or pus.     (Leave dressing on your IV site for at least 2 hours and keep area clean and dry  - If you get sick or have symptoms of  infection or are not feeling well for any reason.    (Wash your hands often, stay away from people who are sick)  - If you have side effects from your medication that do not go away or are bothersome.     (Refer to the teaching your nurse gave you for side effects to call your doctor about)    - Common side effects may include:  stuffy nose, headache, feeling tired, muscle aches, upset stomach  - Before receiving any vaccines     - Call [facility] at   If:  - You get sick, are on antibiotics, have had a recent vaccine, have surgery or dental work and your doctor wants your visit rescheduled.  - You need to cancel and reschedule your visit for any reason. Call at least 2 days before your visit if you need to cancel.   - Your insurance changes before your next visit.    (We will need to get approval from your new insurance. This can take up to two weeks.)     [facility] is opened Monday thru Friday. We are closed on weekends and holidays.   Voice mail will take your call if the center is closed. If you leave a message please allow 24 hours for a call back during weekdays. If you leave a message on a weekend/holiday, we will call you back the next business day.    A pharmacist is available Monday - Friday from 8:30AM to 3:30PM to help answer any questions you may have about your prescriptions(s). Please call pharmacy at:    [facility]: [phone]  [facility]: [phone]  [facility]: [phone]        Patient Instructions  IV Infusion  [facility]      1. A responsible adult must stay with you during your infusion and drive you home. If you do not have a responsible adult with transportation home, your appointment will be rescheduled. Patients must still have a responsible adult if they use [employer]ides[employer], Uber, or Lyft. Uber, Rides[employer], or Lyft drivers do not qualify.   2. On the day of your infusion we ask that you please  drink clear liquids until your appointment.  You should NOT eat food 4 hours before your infusion.    3. Take all medications as prescribed before your infusion. Do not withhold blood pressure medication.   4. Patients who use a CPAP or BIPAP should bring it to the infusion appointment.   5. Children under 16 will not be permitted in the infusion clinic. Please do not bring young children or pets. For patients who must bring a service animal, paperwork will be required. NO EXCEPTIONS.  6.  All Women will be required to take a pregnancy test prior to the infusion unless they are above 55 years of age or have had a hysterectomy.   7. Patients who cancel their infusion should call the Infusion line at [phone] as soon as possible. We would appreciate a 48-hour notice. Please be on time for the appt. Patients who are more than 15 mins late will have to cancel and reschedule. Infusion appt times are minimal. Patients who do not show, cancel, or reschedule an appointment may have a long wait until we can get them back on the schedule.   8. We make every effort to schedule your infusions based on your physician's recommendations. However, factors will affect our infusion schedule and availability. We appreciate your understanding.  9. Appointments will only be scheduled for two appointments in the future.   10. No eating, drinking, or chewing gum during the infusion.   11. If you miss or cancel your infusion appointment it is YOUR responsibility to call us and reschedule.  Please call [phone] or [phone] to reschedule or cancel appointment

## 2025-05-08 NOTE — PROGRESS NOTES
S: Patient here for #31 opioid sparing pain infusion. Patient reports 50-60% reduction in pain after last infusion that lasted 1.5 weeks.    Purpose of pain infusion meds explained along with potential side effects.  Patient verbalized understanding.    B: Pain Issues. Is Patient breast feeding: No      Is Patient receiving any other form Ketamine from any other facility/ outside clinic ?: No  A: Patient currently has pain described on flow sheet documentation. Designated  is Adrian Connor. Patient last ate solid food >14 hours ago, and had liquid >4 hours ago.    R: Plan; Obtain IV access, do patient risk assessment, and start opioid sparing infusion as ordered. Monitoring for S/S of adverse reactions.

## 2025-05-08 NOTE — PROGRESS NOTES
Post infusion teaching provided. Patient verbalized understanding. VSS, Patient states pain is zero.    Patient tolerated pain infusion well without difficulty except for nausea relieved with PRN Zofran.

## 2025-05-08 NOTE — PROGRESS NOTES
"[facility] Clinical Note  Patient Reassessment     Introduction  Sasha Longoria is a 34 y.o. female who is on the specialty pharmacy service for management of: Migraine Core.      [facility] supplied medication: Ajovy    Duration of therapy: Maintenance    The most recent encounter visit with the referring prescriber STPEHANIE Matt on 05/07/2025 was reviewed.  Pharmacy will continue to collaborate in the care of this patient with the referring prescriber.    Discussion  Sasha was contacted on 5/8/2025 at 2:22 PM for a pharmacy visit with encounter number [id number] from:   [facility]  [address] [place] [id number]  Dept: [phone]  Dept Fax: [phone]  Sasha consented to a/an Telephone visit, which was performed.    Efficacy  Patient has developed new symptoms of condition: No  Patient/caregiver feels medication is affecting the disease state: Yes    Goals  Provided education on goals and possible outcomes of therapy:  Adherence with therapy  Timely completion of appropriate labs  Timely and appropriate follow up with provider  Identify and address medication interactions with presciption medications, OTC medications and supplements  Optimize or maintain quality of life  Neurology- Migraine: 50% reduction in migraine days each month from baseline  Decreased use of \"prn\" agents to treat migraine headaches  Improvement in MIDAS score from baseline  Patient has documented target(s) for goals of therapy: Yes    Tolerance  Patient has experienced side effects from this medication: No  Changes to current therapy regimen: No    The follow-up timeline was discussed. Every person responds to and reacts to therapy differently. Patient should be assessed for efficacy and tolerability in approximately: 3 months    Adherence  Patient Information  Informant: Self (Patient)  Demonstrates Understanding of Importance of Adherence: Yes  Does " the patient have any barriers to self-administration (including physical and mental?): No  Barriers to Self-Administration: none  Adherence Tools Used: Alarm  Medication Information  Medication: fremanezumab-vfrm (Ajovy Autoinjector)  Patient Reported Missed Doses in the Last 4 Weeks: 0  Estimated Medication Adherence Level: Good  Adherence Estimation Source: Claims history  Barriers to Adherence: No Problems identified  What concerns do you have regarding your medications?: none   The importance of adherence was discussed and patient/caregiver was advised to take the medication as prescribed by their provider. Encouraged patient/caregiver to call physician's office or specialty pharmacy if they have a question regarding a missed dose.    General Assessment  Changes to home medications, OTCs or supplements: No  Current Medications[1]  Reported new allergies: No  Reported new medical conditions: No  Additional monitoring reviewed: Neurology - Migraine - There are no routine laboratory monitoring parameters for this medication  Is laboratory follow up needed? No    Advised to contact the pharmacy if there are any changes to the patient's medication list, including prescriptions, OTC medications, herbal products, or supplements.    Impression/Plan  This patient has not been identified as high risk due to Lack of high risk qualifiers.  The following action was taken: N/A.    QOL/Patient Satisfaction  Rate your quality of life on scale of 1-10: 7  Rate your satisfaction with  Specialty Pharmacy on scale of 1-10: 9    Provided contact information ([phone]) for [facility] and reviewed dispensing process, refill timeline and patient management follow up. Confirmed understanding of education conducted during assessment. All questions and concerns were addressed and patient/caregiver was encouraged to reach out for additional questions or concerns.    Based on the patient's diagnosis,  medication list, progress towards goals, adherence, tolerance, and medication list, medication remains appropriate: Therapy remains appropriate (I attest)    Mendy Escobar, PharmD        [1]   Current Outpatient Medications   Medication Sig Dispense Refill    alpha lipoic acid 600 mg capsule Take by mouth. 1 capsule daily after meal 1 week  Then 2 capsules for 1 week  Then 3 capsules for 1 week  Then 4 capsules daily.      busPIRone (Buspar) 15 mg tablet Take 1 tablet (15 mg) by mouth 2 times a day. 60 tablet 1    cholecalciferol (Vitamin D-3) 25 MCG (1000 UT) capsule Take 2 capsules (50 mcg) by mouth once daily.      coenzyme Q-10 (Co Q-10) 200 mg capsule Take 1 capsule (200 mg) by mouth 3 times a day.      dihydroergotamine (Trudhesa) 0.725 mg/pump act. (4 mg/mL) nasal spray Administer 1 spray into each nostril every 1 hour if needed for migraine. Dose may be repeated in 1 hour after the first dose. No more than 2 doses within 24 hours or 3 doses within 7 days. 6 each 3    ferrous sulfate, 325 mg ferrous sulfate, tablet Take 1 tablet (325 mg) by mouth once daily with breakfast. 48 tablet 3    fremanezumab (Ajovy Autoinjector) 225 mg/1.5 mL auto-injector Inject 1 Pen (225 mg) under the skin every 28 (twenty-eight) days. 1.5 mL 2    Gas Relief, simethicone, 80 mg chewable tablet CHEW 1 TABLET (80 MG) EVERY 6 HOURS IF NEEDED FOR FLATULENCE.      hydrocortisone 2.5 % ointment APPLY TO AFFECTED AREAS UNDER THE BREAST TWICE DAILY M-F. TAKE WEEKENDS OFF.      imiquimod (Aldara) 5 % cream APPLY A THIN LAYER TO THE AFFECTED AREAS OF THE BODY 3 NIGHTS PER WEEK. WASH HANDS AFTER EACH USE.      ketoconazole (NIZOral) 2 % shampoo Lather onto scalp and let sit for 5 mins before rinsing once daily until improvement.  May decrease to once-twice weekly for maintenance.      levonorgestrel (Mirena) 21 mcg/24 hours (8 yrs) 52 mg IUD by intrauterine route.      MAGNESIUM GLYCINATE ORAL Take 1 capsule by mouth once daily.       modafinil (Provigil) 200 mg tablet Take 1 tablet (200 mg) by mouth once daily. 30 tablet 1    norethindrone (Aygestin) 5 mg tablet Take 1 tablet (5 mg) by mouth once daily. 90 tablet 3    ondansetron (Zofran) 4 mg tablet Take 2 tablets (8 mg) by mouth every 8 hours if needed for nausea or vomiting. 20 tablet 1    sulfaSALAzine (Azulfidine) 500 mg DR tablet Take 3 tablets (1,500 mg) by mouth once daily. Do not crush, chew, or split.      tirzepatide, weight loss, (Zepbound) 7.5 mg/0.5 mL injection Inject 7.5 mg under the skin every 7 days. 12 each 3    vilazodone (Viibryd) 40 mg tablet Take 1 tablet (40 mg) by mouth once daily. 30 tablet 1     Current Facility-Administered Medications   Medication Dose Route Frequency Provider Last Rate Last Admin    heparin flush 100 unit/mL syringe 500 Units  500 Units intra-catheter PRN Dre Justice MD   500 Units at 03/04/25 1012    heparin lock flush (porcine) 10 unit/mL injection 10 Units  10 Units intra-catheter Once Dre Justice MD

## 2025-05-12 ENCOUNTER — APPOINTMENT (OUTPATIENT)
Dept: DERMATOLOGY | Facility: CLINIC | Age: 35
End: 2025-05-12
Payer: COMMERCIAL

## 2025-05-12 NOTE — PROGRESS NOTES
"  Patient ID: Sasha Longoria is a 34 y.o. female who presents for No chief complaint on file..    Referring Provider: STEPHANIE Noriega    Pt was referred for weight managment, insulin resistance, interested in Continuous glucose monitor   Last visit with referring provider: 3/1/24    Subjective     Preferred Pharmacy:    [facility] - [place], [place] - [address]  [address] [facility] [address] [facility] [address]  Phone: [phone] Fax: [phone]    [facility]/pharmacy #[id number] - [place], [place] - [address] AT [address]  [address] [address]  Phone: [phone] Fax: [phone]     Specialty Pharmacy  [address] [address]  Phone: [phone] Fax: [phone]      Adherence:   Do you have copays on your medications? Yes  Do you have trouble affording your current medications? No, but in \"limbo\" was laid off, still looking for a job. Has previous employers insurance currently through [employer] (has ~2 more months remaining).   How many doses of medication did you miss in the last 7 days? 1 dose, morning most often, so many pills, gets distracted and forgets to come back to take all meds.     Subjective/Objective:      Patient identified goal:   Increase endurance (VO2 Max is \"terrible\")  Energy to do ADL, regaining independence  More active, without needing a week of recovery time  More comfortable physically  \"A few years ago\" was at 147 lbs and felt good, today desires to be at a size that is healthy and manageable to maintain.     Onset:   When fibromyalgia was triggered, fall of 2020.   Lifetime of chronic stress and trauma. Traumatic event occurred at this time.   Used to be active, exercising 6 days/week, noticed she couldn't do what she usually could with weight training.     Past success:   X 2 years exercised 6 d/week with an active rest day along with caloric restriction.     Support network: " "  Partner  Therapist  Friends    How long implementing diet/lifestyle change for weight loss:   3 months this time, previously x 2 years.     Disordered eating patterns:   Binge eating disorder without purging - not current- when seeking comfort    Concurrent chronic conditions:   Lab Results   Component Value Date    CHHDL 3.4 11/07/2024    TRIG 82 11/07/2024       Prediabetes/Diabetes? Insulin resistance (TAURUS-IR 4.4)    Pertinent PMH Review:   PMH of Pancreatitis: No  PMH of Gallbladder disease: No  PMH of Delayed Gastric Emptying:  Unsure, hx of stomach cramps/pains/gassy/bloating  PMH of MTC: No  PMH of Retinopathy: No, visits eye doctor regularly because of \"potential retinal detachment in one eye\"   PMH of Urinary Tract Infections: No  PMH of Suicidal Ideation: Yes, past.   Female on oral contraceptive? IUD    Migraines? Yes, 12-13 years ago.   Allergies? Hives/Rash with intensive immune response, has had immune panels that came back negative. Sensitive skin, dermatitis.     Anxiety? Yes    Thyroid health:   Lab Results   Component Value Date    TSH 2.42 11/07/2024        Medications potentially contributing to weight gain:   Antipsychotics/Mood stabilizers/Antiepileptics/Nerve pain: gabapentin,      Medications tried/stopped for weight management:     None, past provider tried Wegovy but insurance denied because also on topiramate.     HPI    Objective     There were no vitals taken for this visit.     Labs  Lab Results   Component Value Date    BILITOT 0.4 11/07/2024    CALCIUM 8.8 11/07/2024    CO2 32 11/07/2024     11/07/2024    CREATININE 0.82 11/07/2024    GLUCOSE 74 11/07/2024    ALKPHOS 77 11/07/2024    K 4.4 11/07/2024    PROT 6.0 (L) 11/07/2024     11/07/2024    AST 12 11/07/2024    ALT 16 11/07/2024    BUN 11 11/07/2024    ANIONGAP 10 11/07/2024    ALBUMIN 4.0 11/07/2024     Lab Results   Component Value Date    TRIG 82 11/07/2024    CHOL 139 11/07/2024    LDLCALC 82 11/07/2024    HDL " 40.4 11/07/2024     Lab Results   Component Value Date    HGBA1C 4.6 11/07/2024    HGBA1C 5.1 02/29/2024    HGBA1C 5.1 02/20/2024     The ASCVD Risk score (Yesenia JACKSON, et al., 2019) failed to calculate for the following reasons:    The 2019 ASCVD risk score is only valid for ages 40 to 79  Lab Results   Component Value Date    VITD25 88 03/04/2025       Current Outpatient Medications on File Prior to Visit   Medication Sig Dispense Refill    alpha lipoic acid 600 mg capsule Take by mouth. 1 capsule daily after meal 1 week  Then 2 capsules for 1 week  Then 3 capsules for 1 week  Then 4 capsules daily.      busPIRone (Buspar) 15 mg tablet Take 1 tablet (15 mg) by mouth 2 times a day. 60 tablet 1    cholecalciferol (Vitamin D-3) 25 MCG (1000 UT) capsule Take 2 capsules (50 mcg) by mouth once daily.      coenzyme Q-10 (Co Q-10) 200 mg capsule Take 1 capsule (200 mg) by mouth 3 times a day.      dihydroergotamine (Trudhesa) 0.725 mg/pump act. (4 mg/mL) nasal spray Administer 1 spray into each nostril every 1 hour if needed for migraine. Dose may be repeated in 1 hour after the first dose. No more than 2 doses within 24 hours or 3 doses within 7 days. 6 each 3    ferrous sulfate, 325 mg ferrous sulfate, tablet Take 1 tablet (325 mg) by mouth once daily with breakfast. 48 tablet 3    fremanezumab (Ajovy Autoinjector) 225 mg/1.5 mL auto-injector Inject 1 Pen (225 mg) under the skin every 28 (twenty-eight) days. 1.5 mL 2    Gas Relief, simethicone, 80 mg chewable tablet CHEW 1 TABLET (80 MG) EVERY 6 HOURS IF NEEDED FOR FLATULENCE.      hydrocortisone 2.5 % ointment APPLY TO AFFECTED AREAS UNDER THE BREAST TWICE DAILY M-F. TAKE WEEKENDS OFF.      imiquimod (Aldara) 5 % cream APPLY A THIN LAYER TO THE AFFECTED AREAS OF THE BODY 3 NIGHTS PER WEEK. WASH HANDS AFTER EACH USE.      ketoconazole (NIZOral) 2 % shampoo Lather onto scalp and let sit for 5 mins before rinsing once daily until improvement.  May decrease to once-twice  weekly for maintenance.      levonorgestrel (Mirena) 21 mcg/24 hours (8 yrs) 52 mg IUD by intrauterine route.      MAGNESIUM GLYCINATE ORAL Take 1 capsule by mouth once daily.      modafinil (Provigil) 200 mg tablet Take 1 tablet (200 mg) by mouth once daily. 30 tablet 1    norethindrone (Aygestin) 5 mg tablet Take 1 tablet (5 mg) by mouth once daily. 90 tablet 3    ondansetron (Zofran) 4 mg tablet Take 2 tablets (8 mg) by mouth every 8 hours if needed for nausea or vomiting. 20 tablet 1    sulfaSALAzine (Azulfidine) 500 mg DR tablet Take 3 tablets (1,500 mg) by mouth once daily. Do not crush, chew, or split.      tirzepatide, weight loss, (Zepbound) 7.5 mg/0.5 mL injection Inject 7.5 mg under the skin every 7 days. 12 each 3    vilazodone (Viibryd) 40 mg tablet Take 1 tablet (40 mg) by mouth once daily. 30 tablet 1    [DISCONTINUED] fremanezumab (Ajovy Autoinjector) 225 mg/1.5 mL auto-injector Inject 1 Pen (225 mg) under the skin every 28 (twenty-eight) days. 1.5 mL 2     Current Facility-Administered Medications on File Prior to Visit   Medication Dose Route Frequency Provider Last Rate Last Admin    heparin flush 100 unit/mL syringe 500 Units  500 Units intra-catheter PRN Dre Justice MD   500 Units at 03/04/25 1012    heparin lock flush (porcine) 10 unit/mL injection 10 Units  10 Units intra-catheter Once Dre Justice MD            Medication and allergy reconciliation completed     Drug Interactions       Assessment/Plan   Zepbound  Tolerating 7.5 mg dose well, has been on x 2 weeks  Mild transient nausea  Had been at 245 to 250 lbs  Since starting 7.5mg has been between 241-244 lbs  Continues with insurance run weight loss program  Regular weigh ins with their provided scale and completes weekly lessons  Has been helping her partner move and went through a big change in medication which caused substantial pain, but has improved since then. Trying to find lowest effective doses of medications.   Hasn't  been doing strength training otherwise  Recommend In Body Scan to establish baseline to help with ensuring we maintain muscle mass  Continue to prioritize protein (20-30 grams at each meal)  Patient will email prior to needing a refill to determine is she would like to stay at current dose or increase.      Working with Wendy Aguilar DC for Functional Medicine  Currently taking many supplements       CONTINUE  Zepbound 7.5mg once weekly  **Update [facility] pharmacy is out of network, patient would prefer rx is sent to [facility].     Follow-up: 07/23/25 10am     Time spent with pt: Total length of time 15 (minutes) of the encounter and more than 50% was spent counseling the patient.      Briseida Polanco, Pharm.D, [facility], [facility]  Clinical Pharmacist  Pharmacy Services  [phone]    Continue all meds under the continuation of care with the referring provider and clinical pharmacy team.    Verbal consent to manage patient's drug therapy was obtained from the patient and/or an individual authorized to act on behalf of a patient. They were informed they may decline to participate or withdraw from participation in pharmacy services at any time.

## 2025-05-13 ENCOUNTER — APPOINTMENT (OUTPATIENT)
Dept: PHARMACY | Facility: HOSPITAL | Age: 35
End: 2025-05-13
Payer: COMMERCIAL

## 2025-05-13 DIAGNOSIS — E88.819 INSULIN RESISTANCE: ICD-10-CM

## 2025-05-13 RX ORDER — SULFASALAZINE 500 MG/1
1500 TABLET, DELAYED RELEASE ORAL 2 TIMES DAILY
COMMUNITY
Start: 2025-04-21 | End: 2025-07-20

## 2025-05-14 ENCOUNTER — APPOINTMENT (OUTPATIENT)
Dept: BEHAVIORAL HEALTH | Facility: CLINIC | Age: 35
End: 2025-05-14
Payer: COMMERCIAL

## 2025-05-14 DIAGNOSIS — Z91.89 AT RISK FOR NAUSEA: ICD-10-CM

## 2025-05-14 DIAGNOSIS — G47.11 IDIOPATHIC HYPERSOMNIA: ICD-10-CM

## 2025-05-14 DIAGNOSIS — R53.82 CHRONIC FATIGUE: ICD-10-CM

## 2025-05-14 DIAGNOSIS — F51.5 NIGHTMARES ASSOCIATED WITH CHRONIC POST-TRAUMATIC STRESS DISORDER: ICD-10-CM

## 2025-05-14 DIAGNOSIS — M54.41 ACUTE BILATERAL LOW BACK PAIN WITH BILATERAL SCIATICA: Primary | ICD-10-CM

## 2025-05-14 DIAGNOSIS — F43.12 NIGHTMARES ASSOCIATED WITH CHRONIC POST-TRAUMATIC STRESS DISORDER: ICD-10-CM

## 2025-05-14 DIAGNOSIS — F41.1 GENERALIZED ANXIETY DISORDER: ICD-10-CM

## 2025-05-14 DIAGNOSIS — M54.42 ACUTE BILATERAL LOW BACK PAIN WITH BILATERAL SCIATICA: Primary | ICD-10-CM

## 2025-05-14 DIAGNOSIS — F43.10 PTSD (POST-TRAUMATIC STRESS DISORDER): ICD-10-CM

## 2025-05-14 DIAGNOSIS — F33.1 MODERATE EPISODE OF RECURRENT MAJOR DEPRESSIVE DISORDER: ICD-10-CM

## 2025-05-14 PROCEDURE — 99214 OFFICE O/P EST MOD 30 MIN: CPT

## 2025-05-14 RX ORDER — VILAZODONE HYDROCHLORIDE 40 MG/1
40 TABLET ORAL DAILY
Qty: 30 TABLET | Refills: 1 | Status: SHIPPED | OUTPATIENT
Start: 2025-05-14 | End: 2025-07-13

## 2025-05-14 RX ORDER — BUSPIRONE HYDROCHLORIDE 30 MG/1
30 TABLET ORAL 2 TIMES DAILY
Qty: 60 TABLET | Refills: 1 | Status: SHIPPED | OUTPATIENT
Start: 2025-05-14 | End: 2025-07-13

## 2025-05-14 RX ORDER — ARMODAFINIL 200 MG/1
200 TABLET ORAL DAILY
Qty: 30 TABLET | Refills: 1 | Status: SHIPPED | OUTPATIENT
Start: 2025-05-14 | End: 2025-07-13

## 2025-05-14 RX ORDER — PRAZOSIN HYDROCHLORIDE 1 MG/1
1 CAPSULE ORAL NIGHTLY
Qty: 30 CAPSULE | Refills: 1 | Status: SHIPPED | OUTPATIENT
Start: 2025-05-14 | End: 2025-07-13

## 2025-05-14 RX ORDER — CYCLOBENZAPRINE HCL 5 MG
5 TABLET ORAL 3 TIMES DAILY PRN
Qty: 30 TABLET | Refills: 0 | Status: SHIPPED | OUTPATIENT
Start: 2025-05-14 | End: 2025-07-13

## 2025-05-14 NOTE — PROGRESS NOTES
"Sasha Longoria is a 34 y.o. female patient presenting for a(an) virtual FUV  Visit location: patient (Sasha Longoria, home), provider (LAINEY RaphaelCNP, virtual office)  Pt identify self by name, , and address    Chief Complaint   Patient presents with    Anxiety    Depression    PTSD (Post-Traumatic Stress Disorder)    Sleeping Problem    Follow-up    Med Management     HPI    \"Things have been pretty busy/stressful, but I've been doing okay.\"  \"Haven't really noticed any noticeable differences with he medication maybe because I'm pretty stress. I did  the increase dose and taking it because haven't noticed differences with either one.\"  Denies any noticeable side effects from the meds.   Denies flashbacks except occasionally when receiving ketamine infusion for pain from pain management.  However, endorse ongoing struggle with intermittent nightmares, minimal improvement since last visit  Denies panic attacks or mood swings  Denies substance use  Denies SI/HI/AVH/delusions/shira/hypomania  Appetite is normal    Current S/Sx:  -Mood swings/disorder or bipolar symptoms:   - stable     Depression (single, recurrent, seasonal, specific intermittency):   -Other depressive sx/s: improving fatigue/motivation/brain fog  -Fatigue/Energy: improving  -Motivation: improving   -Concentration: improving \"brain fog\"   -Feeling hope/help/worthless: denies  -Sleep: sleeps vivid dreams overnight since 2024  -Appetite/Weight Changes: fluctuates appetite, no weight changes  - PHQ-9 (13)    SI/HI  -Reports passive hx of SI without intent/plan. Denies current suicidal intend/plan  -CSSRS Low risks    Weapon safety  -Guns/Weapons at home: denies    Psychosis/schizophrenia  - Psychosis: denies hallucinations/delusions, denies paranoia  - Intrusive thoughts: Denies    Anxiety: (generalized, social, agoraphobia, speaking, specific):   -Worry excessively: present/impactful - especially about " relationships  -NINA-7 (14)    PTSD  - Flashbacks: Denies  -Nightmares: daily vivid dreams, nightmares - common  -Other symptoms: hx of night terrors (continue to occur 1-2 times/month), denies night terrors since last visit    OCD  - Obsessive/compulsive thoughts/acts: not assessed    ADHD  - states she was previously assessed by [facility] and  - found not to have ADHD. Foggy brain, poor concentration/fatigue/low motivation might be caused by over medication  - BAARS-IV Questionnaire: not administered     Panic attacks  Denies panic attacks since last visit     HISTORY  PSYCH HISTORY  -Psych Hx: (dx with anxiety & depression - 2009), cPTSD (2020)/sleep disturbance (2016)  -Psych Hospitalization Hx: denies  -Suicide Attempt Hx: denies  -Self-Harm/Violence Hx: denies  -Current psych meds: Wellbutrin  mg daily (2009/2010) (reduced from 300 mg d/t concerns of side effects of taking alongside other meds), Cymbalta 60 mg daily (reduced from 120 mg d/t side effects of sexual dysfunction related - symptoms are more manageable now).  -Psych Med Hx: Vyvanse 20 (noticed minimal improvement), Strattera (25, 40, 80 mg - minimally beneficial). Have tried Effexor ineffective), Abilify (2009/2010). Stopped Wellbutrin with Abilify around 2010/2011 because therapy was effective. States when she resumed care with psychology (2020) & psychiatry (2021), was placed on Zoloft, became in effective overtime, Wellbutrin was re-added in 2023/2024. Have tried Trazodone (taken for several yrs - inconsistently effective), Mirtazapine (inconsistently helpful) - both ineffective. Was prescribed Prazosin 2 mg (stopped taking because it didn't help much). Just stopped Gabapentin (caused side effects). Weaned off Wellbutrin (2025), Cymbalta (2025) - [facility] results (severe interaction)     SUBSTANCE USE HISTORY  -Substance Use Hx: denies  -ETOH: rarely  -Tobacco: denies  -Caffeine: coffee  -Substance Abuse Treatment  Hx: denies     FAMILY HISTORY  -Family Psych Hx: all 6 siblings: depression, anxiety. Mom: depression. Father: depression/undx bipolar  -Family Suicide Hx: denies  -Family Substance Abuse Hx: mom's side: struggled with alcoholism, cannabis, narcotics, cocaine, father: abused narcotics, cocaine. Sister: struggled with heroin use     SOCIAL HISTORY  -Upbringing: Grew up with both parents. Has 6 siblings  -income: denies financial struggles  -safety: feels safe at home/generally  -Support system: average support system  -Trauma: Mixed childhood/adulthood, poor memory of possible childhood trauma. Denies physical trauma  -Education: bachelors  -Work: compliance office  -Marital Status: , has current partner  -Children: none  -Living situation: lives alone, partner around 1/2 time/week  -: denies  -Legal: denies      MEDICAL HISTORY  -PCP: DONALD Palmer-CNP  -TBI/head trauma/LOC/seizure hx: mild head injury in 2013, fell down stairs and hit head     REVIEW OF SYSTEMS  Review of Systems   Constitutional:         Fibromyalgia & PCOS, MIKAELA (prescribed CPAP- still finding the right type of mask)   All other systems reviewed and are negative.       PHYSICAL EXAM  Physical Exam  Psychiatric:         Attention and Perception: Attention and perception normal.         Mood and Affect: Mood is anxious and depressed. Affect is flat.         Speech: Speech normal.         Behavior: Behavior normal. Behavior is cooperative.         Thought Content: Thought content normal.         Cognition and Memory: Cognition and memory normal.         Judgment: Judgment normal.          IMPRESSION  Sasha Longoria is a 34 y.o. female patient presents virtual [facility].  Reports a lot of stress in personal life that may be impacting/masking symptom improvement.  Patient reports improvement to symptoms relating to idiopathic hypersomnia, energy, motivation, and concentration since starting modafinil remains mild despite  last medication dose adjustment.  Denies any noticeable changes since last Viibryd dose adjustment.  Anxiety remains elevated on buspirone 50 mg twice daily.  Reports ongoing nightmares but denies flashbacks. Appetite is normal.  Denies substance use.  Denies any noticeable side effects from medications.  Denies SI/HI/AVH/delusions/shira/hypomania. Discussed to switch from modafinil 200 mg daily to armodafinil 200 mg daily for improved idiopathic hypersomnia/fatigue/inattention management, continue Viibryd for anxiety, depression, and PTSD related symptoms, further increase buspirone for anxiety, start prazosin for nightmares.  Discussed to continue attending weekly counseling.  Will follow up with this provider in 4 weeks to continue monitoring, or sooner if needed.      Plan:     1. Reviewed diagnostic impression including subjective and objective data and provided education about NINA, MDD, PTSD, sleep disturbance, ADHD, panic attacks, bipolar disorder, and substance use/abuse, etiology, treatment recommendations including medication, therapy, course of treatment and prognosis. Patient amenable to treatment plan.     2. Safety Assessment: History of passive thoughts without intent/plan (2020), but denies current thoughts of SI, plan/intent.  No h/o SA. RF include , unmarried/single, living alone/lack of social support, history of trauma/abuse, chronic medical illness, chronic pain, current psychiatric illness, feelings of hopelessness, and panic attacks. PF include strong coping skills, hopefulness/future orientation, and employment, engaged in care, future oriented, no guns.  Medium imminent risk     3. @  Problem List Items Addressed This Visit       PTSD (post-traumatic stress disorder)    Relevant Medications    prazosin (Minipress) 1 mg capsule    vilazodone (Viibryd) 40 mg tablet    Generalized anxiety disorder    Relevant Medications    busPIRone (Buspar) 30 mg tablet    Depression    Relevant  Medications    vilazodone (Viibryd) 40 mg tablet    Idiopathic hypersomnia    Relevant Medications    armodafinil (Nuvigil) 200 mg tablet     Other Visit Diagnoses         Chronic fatigue        Relevant Medications    armodafinil (Nuvigil) 200 mg tablet      Nightmares associated with chronic post-traumatic stress disorder        Relevant Medications    prazosin (Minipress) 1 mg capsule          START Armodafinil 200 mg daily (formulation change)  STOP modafinil 200 mg daily   CONTINUE Viibryd 40 mg daily   INCREASE Buspirone 30 mg BID  START Prazosin 1 mg daily     Reviewed r/b/a, possible side effects of the medication. Client is aware about the benefit outweighs the risk.     4. INDIVIDUAL THERAPY: [facility], weekly (~4yrs)     5. OARRS: last filled Modafinil on 04/28/2025 , Gabapentin 300 mg on 12/12/2024 (270 caps x 90 days), Temazepam 7.5 mg on 11/11/2024      6. Labs reviewed    7. Last Urine Drug Screen  Date of Last Screen: 3/20/2025    8. Controlled Substance Agreement:  Date of the Last Agreement: 3/5/2025  Reviewed Controlled Substance Agreement including but not limited to the benefits, risks, and alternatives to treatment with a Controlled Substance medication(s).    9. Follow-up with this provider in 3 weeks.     10. -Total time: 22 minutes via virtual     - Follow up with physical health providers as scheduled  - May follow up sooner if experiences worsening symptoms by calling  Psychiatry at [phone]  - Patient verbalized an understanding to call [facility] at [phone] ([facility]), 211, or [phone]/go to the nearest emergency room if experiences thoughts of harm to self or others.

## 2025-05-15 RX ORDER — ONDANSETRON 4 MG/1
8 TABLET, FILM COATED ORAL EVERY 8 HOURS PRN
Qty: 20 TABLET | Refills: 1 | Status: SHIPPED | OUTPATIENT
Start: 2025-05-15

## 2025-05-16 ENCOUNTER — APPOINTMENT (OUTPATIENT)
Dept: OBSTETRICS AND GYNECOLOGY | Facility: CLINIC | Age: 35
End: 2025-05-16
Payer: COMMERCIAL

## 2025-05-20 ENCOUNTER — SPECIALTY PHARMACY (OUTPATIENT)
Dept: PHARMACY | Facility: CLINIC | Age: 35
End: 2025-05-20

## 2025-05-20 PROCEDURE — RXMED WILLOW AMBULATORY MEDICATION CHARGE

## 2025-05-23 ENCOUNTER — PHARMACY VISIT (OUTPATIENT)
Dept: PHARMACY | Facility: CLINIC | Age: 35
End: 2025-05-23
Payer: COMMERCIAL

## 2025-06-03 ENCOUNTER — APPOINTMENT (OUTPATIENT)
Dept: NEUROLOGY | Facility: CLINIC | Age: 35
End: 2025-06-03
Payer: COMMERCIAL

## 2025-06-03 VITALS
SYSTOLIC BLOOD PRESSURE: 104 MMHG | HEIGHT: 61 IN | DIASTOLIC BLOOD PRESSURE: 84 MMHG | HEART RATE: 98 BPM | TEMPERATURE: 97.2 F | RESPIRATION RATE: 16 BRPM | WEIGHT: 232 LBS | BODY MASS INDEX: 43.8 KG/M2

## 2025-06-03 DIAGNOSIS — G43.119 INTRACTABLE MIGRAINE WITH AURA WITHOUT STATUS MIGRAINOSUS: Primary | ICD-10-CM

## 2025-06-03 PROCEDURE — 64615 CHEMODENERV MUSC MIGRAINE: CPT | Performed by: NURSE PRACTITIONER

## 2025-06-03 ASSESSMENT — PAIN SCALES - GENERAL: PAINLEVEL_OUTOF10: 7

## 2025-06-03 ASSESSMENT — PATIENT HEALTH QUESTIONNAIRE - PHQ9
SUM OF ALL RESPONSES TO PHQ9 QUESTIONS 1 AND 2: 0
2. FEELING DOWN, DEPRESSED OR HOPELESS: NOT AT ALL
1. LITTLE INTEREST OR PLEASURE IN DOING THINGS: NOT AT ALL

## 2025-06-03 NOTE — PROGRESS NOTES
Patient ID: Sasha Longoria is a 34 y.o. female.    Head/Face/Jaw Botulinum Injection    Date/Time: 6/3/2025 3:42 PM    Performed by: STEPHANIE Matt  Authorized by: STEPHANIE Matt      Consent:      Consent obtained:  Verbal     Consent given by:  Patient    Procedure details:      EMG used?  No     Electrical stimulation used?  No     Diluted by:  Preservative free saline     Total units available:  200       Right frontalis:  10 units divided amongst 2 site(s)     Left frontalis:  10 units divided amongst 2 site(s)     Right :  5 units divided amongst 1 site(s)     Left :  5 units divided amongst 1 site(s)     Procerus (midline):  5 units divided amongst 1 site(s)     Right occipitalis:  15 units divided amongst 3 site(s)     Left occipitalis:  15 units divided amongst 3 site(s)     Right cervical paraspinal:  10 units divided amongst 2 site(s)     Left cervical paraspinal:  10 units divided amongst 2 site(s)     Right trapezius:  15 units divided amongst 3 site(s)     Left trapezius:  15 units divided amongst 3 site(s)     Right temporalis:  20 units divided amongst 4 site(s)     Left temporalis:  20 units divided amongst 4 site(s)         Total units injected:  155     Total units wasted:  45    Post-procedure details:      Patient tolerance of procedure:  Tolerated well, no immediate complications    Comments: Please do not rub areas for 24 hours.  No pressure above eyebrows for 24 hours.  Watch out for helmets, headlamps, headbands, goggles, or massage for 24 hours.  If there is discomfort, ice for the first 24 hour,s heat after that.  Headaches may worsen, or you may experience neck stiffness.  If this occurs use your usual headache medication or a mild anti inflammatory such as advil or aleve.  Please call if you have difficulty swallowing.             Physical Exam

## 2025-06-05 DIAGNOSIS — M79.7 FIBROMYALGIA: Primary | ICD-10-CM

## 2025-06-05 RX ORDER — HEPARIN 100 UNIT/ML
5 SYRINGE INTRAVENOUS AS NEEDED
OUTPATIENT
Start: 2025-06-10

## 2025-06-05 RX ORDER — KETOROLAC TROMETHAMINE 30 MG/ML
30 INJECTION, SOLUTION INTRAMUSCULAR; INTRAVENOUS ONCE
OUTPATIENT
Start: 2025-06-10 | End: 2025-06-10

## 2025-06-10 ENCOUNTER — INFUSION (OUTPATIENT)
Dept: INFUSION THERAPY | Facility: CLINIC | Age: 35
End: 2025-06-10
Payer: COMMERCIAL

## 2025-06-10 VITALS
DIASTOLIC BLOOD PRESSURE: 71 MMHG | HEART RATE: 98 BPM | TEMPERATURE: 97.5 F | RESPIRATION RATE: 20 BRPM | SYSTOLIC BLOOD PRESSURE: 125 MMHG | OXYGEN SATURATION: 97 %

## 2025-06-10 DIAGNOSIS — M79.7 FIBROMYALGIA: ICD-10-CM

## 2025-06-10 LAB — PREGNANCY TEST URINE, POC: NEGATIVE

## 2025-06-10 PROCEDURE — 96375 TX/PRO/DX INJ NEW DRUG ADDON: CPT | Mod: INF

## 2025-06-10 PROCEDURE — 96365 THER/PROPH/DIAG IV INF INIT: CPT | Mod: INF

## 2025-06-10 PROCEDURE — 2500000004 HC RX 250 GENERAL PHARMACY W/ HCPCS (ALT 636 FOR OP/ED): Performed by: NURSE PRACTITIONER

## 2025-06-10 PROCEDURE — 81025 URINE PREGNANCY TEST: CPT

## 2025-06-10 PROCEDURE — 96368 THER/DIAG CONCURRENT INF: CPT | Mod: INF

## 2025-06-10 RX ORDER — ONDANSETRON HYDROCHLORIDE 2 MG/ML
4 INJECTION, SOLUTION INTRAVENOUS ONCE
OUTPATIENT
Start: 2025-06-24 | End: 2025-06-24

## 2025-06-10 RX ORDER — HEPARIN 100 UNIT/ML
5 SYRINGE INTRAVENOUS AS NEEDED
Status: DISCONTINUED | OUTPATIENT
Start: 2025-06-10 | End: 2025-06-10 | Stop reason: HOSPADM

## 2025-06-10 RX ORDER — EPINEPHRINE 0.3 MG/.3ML
0.3 INJECTION SUBCUTANEOUS EVERY 5 MIN PRN
OUTPATIENT
Start: 2025-06-24

## 2025-06-10 RX ORDER — ONDANSETRON HYDROCHLORIDE 2 MG/ML
4 INJECTION, SOLUTION INTRAVENOUS ONCE
Status: COMPLETED | OUTPATIENT
Start: 2025-06-10 | End: 2025-06-10

## 2025-06-10 RX ORDER — KETOROLAC TROMETHAMINE 30 MG/ML
30 INJECTION, SOLUTION INTRAMUSCULAR; INTRAVENOUS ONCE
OUTPATIENT
Start: 2025-06-24 | End: 2025-06-24

## 2025-06-10 RX ORDER — NITROGLYCERIN 0.4 MG/1
0.4 TABLET SUBLINGUAL ONCE
OUTPATIENT
Start: 2025-06-24 | End: 2025-06-24

## 2025-06-10 RX ORDER — METOPROLOL TARTRATE 25 MG/1
25 TABLET, FILM COATED ORAL ONCE
OUTPATIENT
Start: 2025-06-24 | End: 2025-06-24

## 2025-06-10 RX ORDER — DIPHENHYDRAMINE HYDROCHLORIDE 50 MG/ML
50 INJECTION, SOLUTION INTRAMUSCULAR; INTRAVENOUS AS NEEDED
OUTPATIENT
Start: 2025-06-24

## 2025-06-10 RX ORDER — HEPARIN 100 UNIT/ML
5 SYRINGE INTRAVENOUS AS NEEDED
OUTPATIENT
Start: 2025-06-24

## 2025-06-10 RX ORDER — METOCLOPRAMIDE HYDROCHLORIDE 5 MG/ML
10 INJECTION INTRAMUSCULAR; INTRAVENOUS ONCE
OUTPATIENT
Start: 2025-06-24 | End: 2025-06-24

## 2025-06-10 RX ORDER — FAMOTIDINE 10 MG/ML
20 INJECTION, SOLUTION INTRAVENOUS ONCE AS NEEDED
OUTPATIENT
Start: 2025-06-24

## 2025-06-10 RX ORDER — KETOROLAC TROMETHAMINE 30 MG/ML
30 INJECTION, SOLUTION INTRAMUSCULAR; INTRAVENOUS ONCE
Status: COMPLETED | OUTPATIENT
Start: 2025-06-10 | End: 2025-06-10

## 2025-06-10 RX ORDER — ALBUTEROL SULFATE 0.83 MG/ML
3 SOLUTION RESPIRATORY (INHALATION) AS NEEDED
OUTPATIENT
Start: 2025-06-24

## 2025-06-10 RX ORDER — DIPHENHYDRAMINE HYDROCHLORIDE 50 MG/ML
25 INJECTION, SOLUTION INTRAMUSCULAR; INTRAVENOUS ONCE
OUTPATIENT
Start: 2025-06-24 | End: 2025-06-24

## 2025-06-10 RX ADMIN — HEPARIN 500 UNITS: 100 SYRINGE at 14:46

## 2025-06-10 RX ADMIN — ONDANSETRON 4 MG: 2 INJECTION INTRAMUSCULAR; INTRAVENOUS at 13:54

## 2025-06-10 RX ADMIN — Medication: at 13:58

## 2025-06-10 RX ADMIN — KETOROLAC TROMETHAMINE 30 MG: 30 INJECTION, SOLUTION INTRAMUSCULAR at 13:54

## 2025-06-10 RX ADMIN — PROPOFOL 100 MG: 10 INJECTION, EMULSION INTRAVENOUS at 13:58

## 2025-06-10 ASSESSMENT — PAIN DESCRIPTION - DESCRIPTORS: DESCRIPTORS: ACHING

## 2025-06-10 ASSESSMENT — ENCOUNTER SYMPTOMS
OCCASIONAL FEELINGS OF UNSTEADINESS: 0
LOSS OF SENSATION IN FEET: 1
DEPRESSION: 1

## 2025-06-10 ASSESSMENT — PAIN SCALES - GENERAL
PAINLEVEL_OUTOF10: 7
PAINLEVEL_OUTOF10: 3

## 2025-06-10 ASSESSMENT — PAIN - FUNCTIONAL ASSESSMENT
PAIN_FUNCTIONAL_ASSESSMENT: 0-10
PAIN_FUNCTIONAL_ASSESSMENT: 0-10

## 2025-06-10 NOTE — PROGRESS NOTES
S: Patient here for 31st opioid sparing pain infusion. Patient reports 50% reduction in pain after last infusion that lasted 1.5 weeks.    Purpose of pain infusion meds explained along with potential side effects.  Patient verbalized understanding.    B: Pain Issues. Is Patient breast feeding: NO        Is Patient receiving any other form Ketamine from any other facility/ outside clinic ?: NO    A: Patient currently has pain described on flow sheet documentation. Designated  is Transave . Patient last ate solid food 4 hours ago, and had liquid 1 hours ago.    R: Plan; Obtain IV access, do patient risk assessment, and start opioid sparing infusion as ordered. Monitoring for S/S of adverse reactions.      Post infusion teaching provided. Patient verbalized understanding. VSS, Patient states pain is 3/10. Will assist patient to waiting car via wheelchair.

## 2025-06-10 NOTE — PATIENT INSTRUCTIONS
Today :We administered ondansetron, (ketamine 30 mg, lidocaine (Xylocaine) 300 mg in sodium chloride 0.9% 518 mL IV), propofol (Diprivan) 100 mg in dextrose 5% 25 mL IV, and ketorolac.     For:   1. Fibromyalgia         Your next appointment is due in:  6/24/2025        Please read the  Medication Guide that was given to you and reviewed during todays visit.     (Tell all doctors including dentists that you are taking this medication)     Go to the emergency room or call 911 if:  -You have signs of allergic reaction:   -Rash, hives, itching.   -Swollen, blistered, peeling skin.   -Swelling of face, lips, mouth, tongue or throat.   -Tightness of chest, trouble breathing, swallowing or talking     Call your doctor:  - If IV / injection site gets red, warm, swollen, itchy or leaks fluid or pus.     (Leave dressing on your IV site for at least 2 hours and keep area clean and dry  - If you get sick or have symptoms of infection or are not feeling well for any reason.    (Wash your hands often, stay away from people who are sick)  - If you have side effects from your medication that do not go away or are bothersome.     (Refer to the teaching your nurse gave you for side effects to call your doctor about)    - Common side effects may include:  stuffy nose, headache, feeling tired, muscle aches, upset stomach  - Before receiving any vaccines     - Call [facility] at   If:  - You get sick, are on antibiotics, have had a recent vaccine, have surgery or dental work and your doctor wants your visit rescheduled.  - You need to cancel and reschedule your visit for any reason. Call at least 2 days before your visit if you need to cancel.   - Your insurance changes before your next visit.    (We will need to get approval from your new insurance. This can take up to two weeks.)     The [facility] is opened Monday thru Friday. We are closed on weekends and holidays.   Voice mail will take your  call if the center is closed. If you leave a message please allow 24 hours for a call back during weekdays. If you leave a message on a weekend/holiday, we will call you back the next business day.    A pharmacist is available Monday - Friday from 8:30AM to 3:30PM to help answer any questions you may have about your prescriptions(s). Please call pharmacy at:    [facility]: [phone]  [facility]: [phone]  [facility]: [phone]              [facility] OUTPATIENT [place]      Pain Infusion Aftercare Instructions      1. It is normal to feel sedated, tired and low in energy after a pain infusion. DO NOT DRIVE, OPERATE ANY MACHINERY, OR MAKE ANY IMPORTANT DECISIONS FOR AT LEAST 24 HOURS AFTER THE INFUSION.     2. Call the pain center at [phone] with any problems, questions, or concerns.     3. Eat light after the infusion. If you feel queasy or sick to your stomach, laying down with your eyes closed may help. When you resume eating start with something mild like clear liquids, yogurt, applesauce, crackers, etc… Gradually advance to a regular diet.     4. Do not leave your house alone the evening of your pain infusion.     5. No alcohol or sedative medications, such as sleeping pills, for 24 hours after your pain infusion.     6. Resume all other prescribed medications unless directed otherwise by you physician.     7. If you have any medical emergencies, call 911 or go directly to the closest emergency room.

## 2025-06-11 ENCOUNTER — APPOINTMENT (OUTPATIENT)
Dept: BEHAVIORAL HEALTH | Facility: CLINIC | Age: 35
End: 2025-06-11
Payer: COMMERCIAL

## 2025-06-11 DIAGNOSIS — F33.1 MODERATE EPISODE OF RECURRENT MAJOR DEPRESSIVE DISORDER: ICD-10-CM

## 2025-06-11 DIAGNOSIS — R53.82 CHRONIC FATIGUE: ICD-10-CM

## 2025-06-11 DIAGNOSIS — F41.1 GENERALIZED ANXIETY DISORDER: ICD-10-CM

## 2025-06-11 DIAGNOSIS — F43.12 NIGHTMARES ASSOCIATED WITH CHRONIC POST-TRAUMATIC STRESS DISORDER: ICD-10-CM

## 2025-06-11 DIAGNOSIS — F43.10 PTSD (POST-TRAUMATIC STRESS DISORDER): ICD-10-CM

## 2025-06-11 DIAGNOSIS — G47.9 SLEEP DISTURBANCE: ICD-10-CM

## 2025-06-11 DIAGNOSIS — F51.5 NIGHTMARES ASSOCIATED WITH CHRONIC POST-TRAUMATIC STRESS DISORDER: ICD-10-CM

## 2025-06-11 DIAGNOSIS — G47.11 IDIOPATHIC HYPERSOMNIA: ICD-10-CM

## 2025-06-11 PROCEDURE — 99214 OFFICE O/P EST MOD 30 MIN: CPT

## 2025-06-11 RX ORDER — PRAZOSIN HYDROCHLORIDE 2 MG/1
2 CAPSULE ORAL NIGHTLY
Qty: 30 CAPSULE | Refills: 1 | Status: SHIPPED | OUTPATIENT
Start: 2025-06-11 | End: 2025-08-10

## 2025-06-11 RX ORDER — HYDROXYZINE HYDROCHLORIDE 10 MG/1
10-20 TABLET, FILM COATED ORAL EVERY 8 HOURS PRN
Qty: 120 TABLET | Refills: 0 | Status: SHIPPED | OUTPATIENT
Start: 2025-06-11 | End: 2025-07-11

## 2025-06-11 RX ORDER — DESVENLAFAXINE 50 MG/1
50 TABLET, EXTENDED RELEASE ORAL DAILY
Qty: 30 TABLET | Refills: 1 | Status: SHIPPED | OUTPATIENT
Start: 2025-06-11 | End: 2025-08-10

## 2025-06-11 RX ORDER — ARMODAFINIL 200 MG/1
200 TABLET ORAL DAILY
Qty: 30 TABLET | Refills: 1 | Status: SHIPPED | OUTPATIENT
Start: 2025-07-12 | End: 2025-09-10

## 2025-06-11 NOTE — PROGRESS NOTES
"Sasha Longoria is a 34 y.o. female patient presenting for a(an) virtual FUV  Visit location: patient (Sasha Longoria, home), provider (LAINEY RaphaelCNP, virtual office)  Pt identify self by name, , and address    Chief Complaint   Patient presents with    Hypersomnia    Sleeping Problem     Nightmares    Follow-up    Med Management    Anxiety    Depression    PTSD (Post-Traumatic Stress Disorder)     HPI    2025  \"Things are mostly unchanged. Haven't noticed any different with anything.\"  \"Starting Prazosin 1 mg, haven't had a lot of nightmares since starting.\"  \"hard time sleeping.\"  \"Still having a lot of anxiety.\"   \"Depression is about the same.\"  Denies any noticeable side effects from the meds.   Denies flashbacks  Denies panic attacks or mood swings  Denies substance use  Denies SI/HI/AVH/delusions/shira/hypomania  Appetite remains normal    Current S/Sx:  -Mood swings/disorder or bipolar symptoms:   - stable     Depression (single, recurrent, seasonal, specific intermittency):   -Other depressive sx/s: improving fatigue/motivation/brain fog  -Fatigue/Energy: improving  -Motivation: improving   -Concentration: improving \"brain fog\"   -Feeling hope/help/worthless: denies  -Sleep: sleeps vivid dreams overnight since 2024  -Appetite/Weight Changes: fluctuates appetite, no weight changes  - PHQ-9 (13)    SI/HI  -Reports passive hx of SI without intent/plan. Denies current suicidal intend/plan  -CSSRS Low risks    Weapon safety  -Guns/Weapons at home: denies    Psychosis/schizophrenia  - Psychosis: denies hallucinations/delusions, denies paranoia  - Intrusive thoughts: Denies    Anxiety: (generalized, social, agoraphobia, speaking, specific):   -Worry excessively: present/impactful - especially about relationships  -NINA-7 (14)    PTSD  - Flashbacks: Denies  -Nightmares: daily vivid dreams, nightmares - common  -Other symptoms: hx of night terrors (continue to occur 1-2 " times/month), denies night terrors since last visit    OCD  - Obsessive/compulsive thoughts/acts: not assessed    ADHD  - states she was previously assessed by [facility] and  - found not to have ADHD. Foggy brain, poor concentration/fatigue/low motivation might be caused by over medication  - BAARS-IV Questionnaire: not administered     Panic attacks  Denies panic attacks since last visit     HISTORY  PSYCH HISTORY  -Psych Hx: (dx with anxiety & depression - 2009), cPTSD (2020)/sleep disturbance (2016)  -Psych Hospitalization Hx: denies  -Suicide Attempt Hx: denies  -Self-Harm/Violence Hx: denies  -Current psych meds: Pristiq 50 mg daily, prazosin 2 mg nightly, armodafinil 200 mg daily, Atarax 10 to 20 mg up to 3 times daily as needed for severe anxiety  -Psych Med Hx: Vyvanse 20 (noticed minimal improvement), Strattera (25, 40, 80 mg - minimally beneficial). Have tried Effexor ineffective), Abilify (2009/2010). Stopped Wellbutrin with Abilify around 2010/2011 because therapy was effective. States when she resumed care with psychology (2020) & psychiatry (2021), was placed on Zoloft, became in effective overtime, Wellbutrin was re-added in 2023/2024. Have tried Trazodone (taken for several yrs - inconsistently effective), Mirtazapine (inconsistently helpful) - both ineffective. Was prescribed Prazosin 2 mg (stopped taking because it didn't help much). Just stopped Gabapentin (caused side effects). Weaned off Wellbutrin (2025), Cymbalta (2025) - [facility] results (severe interaction), modafinil 200 mg (effective), buspirone 30 mg twice daily (effective), Viibryd 40 mg (effective)Wellbutrin  mg daily (2009/2010) (reduced from 300 mg d/t concerns of side effects of taking alongside other meds), Cymbalta 60 mg daily (reduced from 120 mg d/t side effects of sexual dysfunction related - symptoms are more manageable now).     SUBSTANCE USE HISTORY  -Substance Use Hx: denies  -ETOH:  rarely  -Tobacco: denies  -Caffeine: coffee  -Substance Abuse Treatment Hx: denies     FAMILY HISTORY  -Family Psych Hx: all 6 siblings: depression, anxiety. Mom: depression. Father: depression/undx bipolar  -Family Suicide Hx: denies  -Family Substance Abuse Hx: mom's side: struggled with alcoholism, cannabis, narcotics, cocaine, father: abused narcotics, cocaine. Sister: struggled with heroin use     SOCIAL HISTORY  -Upbringing: Grew up with both parents. Has 6 siblings  -income: denies financial struggles  -safety: feels safe at home/generally  -Support system: average support system  -Trauma: Mixed childhood/adulthood, poor memory of possible childhood trauma. Denies physical trauma  -Education: bachelors  -Work: compliance office  -Marital Status: , has current partner  -Children: none  -Living situation: lives alone, partner around 1/2 time/week  -: denies  -Legal: denies      MEDICAL HISTORY  -PCP: DONALD Palmer-CNP  -TBI/head trauma/LOC/seizure hx: mild head injury in 2013, fell down stairs and hit head     REVIEW OF SYSTEMS  Review of Systems   Constitutional:         Fibromyalgia & PCOS, MIKAELA (prescribed CPAP- still finding the right type of mask)   All other systems reviewed and are negative.       PHYSICAL EXAM  Physical Exam  Psychiatric:         Attention and Perception: Attention and perception normal.         Mood and Affect: Mood is anxious and depressed. Affect is flat.         Speech: Speech normal.         Behavior: Behavior normal. Behavior is cooperative.         Thought Content: Thought content normal.         Cognition and Memory: Cognition and memory normal.         Judgment: Judgment normal.          IMPRESSION  Sasha Longoria is a 34 y.o. female patient presents virtual [facility].  Reports ongoing struggle with anxiety, depression, excessive daytime sleepiness, but reports improved nightmares since starting prazosin (has had only 1 nightmares since last visit,  but endorsed vivid dreams).  Remain adherent to Viibryd 40 mg daily, buspirone 30 mg twice daily-did not find both effective for managing anxiety.  Also continue to take armodafinil 200 mg since last visit and has not experienced any significant improvement with excessive daytime sleepiness.   Denies side effects from medications.   Appetite remains unaffected.  Denies flashbacks.  Denies substance use.  Denies SI/HI/AVH/delusions/shira/hypomania. Discussed to bridge from Viibryd to Pristiq based on [facility] information on file for management of anxiety, depression, and PTSD related symptoms.  Continue armodafinil 200 mg for hypersomnia/attention deficit related symptoms.  Increase prazosin to further improve nightmares.  Continue attending weekly counseling.  Will follow up with this provider in 5 weeks to continue monitoring, or sooner if needed.      Plan:     1. Reviewed diagnostic impression including subjective and objective data and provided education about NINA, MDD, PTSD, sleep disturbance, ADHD, panic attacks, bipolar disorder, and substance use/abuse, etiology, treatment recommendations including medication, therapy, course of treatment and prognosis. Patient amenable to treatment plan.     2. Safety Assessment: History of passive thoughts without intent/plan (2020), but denies current thoughts of SI, plan/intent.  No h/o SA. RF include , unmarried/single, living alone/lack of social support, history of trauma/abuse, chronic medical illness, chronic pain, current psychiatric illness, feelings of hopelessness, and panic attacks. PF include strong coping skills, hopefulness/future orientation, and employment, engaged in care, future oriented, no guns.  Medium imminent risk     3. @  Problem List Items Addressed This Visit       PTSD (post-traumatic stress disorder)    Relevant Medications    desvenlafaxine (Pristiq) 50 mg 24 hr tablet    Generalized anxiety disorder    Relevant Medications     desvenlafaxine (Pristiq) 50 mg 24 hr tablet    hydrOXYzine HCL (Atarax) 10 mg tablet    Depression    Relevant Medications    desvenlafaxine (Pristiq) 50 mg 24 hr tablet    Idiopathic hypersomnia    Relevant Medications    armodafinil (Nuvigil) 200 mg tablet (Start on 7/12/2025)     Other Visit Diagnoses         Nightmares associated with chronic post-traumatic stress disorder        Relevant Medications    prazosin (Minipress) 2 mg capsule      Sleep disturbance        Relevant Medications    prazosin (Minipress) 2 mg capsule      Chronic fatigue        Relevant Medications    armodafinil (Nuvigil) 200 mg tablet (Start on 7/12/2025)          CONTINUE Armodafinil 200 mg daily (filled until 9/10/25)  DECREASE Viibryd 20 mg daily over 3 - 7 days (take longer if experiencing withdraw symptoms), then STOP  DECREASE Buspirone 30 mg BID over 3 - 7 days (take longer if experiencing withdraw symptoms), then STOP  INCREASE Prazosin 2 mg daily - nightmares  START Pristiq 50 mg daily - starting 6/18/25 (or day after weaning off Viibryd)  START Atarax 10 mg, take 1-2 tabs up to 3 times daily as needed for severe anxiety     Reviewed r/b/a, possible side effects of the medication. Client is aware about the benefit outweighs the risk.     4. INDIVIDUAL THERAPY: Private [facility] - [facility], weekly (~4yrs)     5. OARRS: last filled Armodafinil 200 mg on 05/14/2025 (30 tabs x 30 days), Modafinil on 04/28/2025 , Gabapentin 300 mg on 12/12/2024 (270 caps x 90 days), Temazepam 7.5 mg on 11/11/2024      6. Labs reviewed    7. Last Urine Drug Screen  Date of Last Screen: 3/20/2025    8. Controlled Substance Agreement:  Date of the Last Agreement: 3/5/2025  Reviewed Controlled Substance Agreement including but not limited to the benefits, risks, and alternatives to treatment with a Controlled Substance medication(s).    9. Follow-up with this provider in 3 weeks.     10. -Total time: 27 minutes via virtual     - Follow up with physical  health providers as scheduled  - May follow up sooner if experiences worsening symptoms by calling  Psychiatry at [phone]  - Patient verbalized an understanding to call Mobile Crisis at [phone] ([facility]), 211, or [phone]/go to the nearest emergency room if experiences thoughts of harm to self or others.

## 2025-06-16 ENCOUNTER — SPECIALTY PHARMACY (OUTPATIENT)
Dept: PHARMACY | Facility: CLINIC | Age: 35
End: 2025-06-16

## 2025-06-17 DIAGNOSIS — M79.7 FIBROMYALGIA: Primary | ICD-10-CM

## 2025-06-17 DIAGNOSIS — G43.009 MIGRAINE WITHOUT AURA AND WITHOUT STATUS MIGRAINOSUS, NOT INTRACTABLE: ICD-10-CM

## 2025-06-17 RX ORDER — FREMANEZUMAB-VFRM 225 MG/1.5ML
225 INJECTION SUBCUTANEOUS
Qty: 1.5 ML | Refills: 1 | Status: SHIPPED | OUTPATIENT
Start: 2025-06-17

## 2025-06-23 DIAGNOSIS — G43.009 MIGRAINE WITHOUT AURA AND WITHOUT STATUS MIGRAINOSUS, NOT INTRACTABLE: ICD-10-CM

## 2025-06-23 RX ORDER — FREMANEZUMAB-VFRM 225 MG/1.5ML
225 INJECTION SUBCUTANEOUS
Qty: 4.5 ML | Refills: 0 | Status: SHIPPED | OUTPATIENT
Start: 2025-06-23

## 2025-06-24 ENCOUNTER — INFUSION (OUTPATIENT)
Dept: INFUSION THERAPY | Facility: CLINIC | Age: 35
End: 2025-06-24
Payer: COMMERCIAL

## 2025-06-24 VITALS
TEMPERATURE: 98.1 F | SYSTOLIC BLOOD PRESSURE: 110 MMHG | HEART RATE: 98 BPM | OXYGEN SATURATION: 96 % | DIASTOLIC BLOOD PRESSURE: 61 MMHG | RESPIRATION RATE: 14 BRPM

## 2025-06-24 DIAGNOSIS — M79.7 FIBROMYALGIA: ICD-10-CM

## 2025-06-24 LAB — PREGNANCY TEST URINE, POC: NEGATIVE

## 2025-06-24 PROCEDURE — 96365 THER/PROPH/DIAG IV INF INIT: CPT | Mod: INF

## 2025-06-24 PROCEDURE — 96375 TX/PRO/DX INJ NEW DRUG ADDON: CPT | Mod: INF

## 2025-06-24 PROCEDURE — 2500000004 HC RX 250 GENERAL PHARMACY W/ HCPCS (ALT 636 FOR OP/ED): Performed by: NURSE PRACTITIONER

## 2025-06-24 PROCEDURE — 96368 THER/DIAG CONCURRENT INF: CPT | Mod: INF

## 2025-06-24 PROCEDURE — 81025 URINE PREGNANCY TEST: CPT

## 2025-06-24 RX ORDER — ONDANSETRON HYDROCHLORIDE 2 MG/ML
4 INJECTION, SOLUTION INTRAVENOUS ONCE
OUTPATIENT
Start: 2025-07-08 | End: 2025-07-08

## 2025-06-24 RX ORDER — DIPHENHYDRAMINE HYDROCHLORIDE 50 MG/ML
50 INJECTION, SOLUTION INTRAMUSCULAR; INTRAVENOUS AS NEEDED
OUTPATIENT
Start: 2025-07-08

## 2025-06-24 RX ORDER — KETOROLAC TROMETHAMINE 30 MG/ML
30 INJECTION, SOLUTION INTRAMUSCULAR; INTRAVENOUS ONCE
Status: CANCELLED | OUTPATIENT
Start: 2025-07-08 | End: 2025-07-08

## 2025-06-24 RX ORDER — FAMOTIDINE 10 MG/ML
20 INJECTION, SOLUTION INTRAVENOUS ONCE AS NEEDED
OUTPATIENT
Start: 2025-07-08

## 2025-06-24 RX ORDER — DIPHENHYDRAMINE HYDROCHLORIDE 50 MG/ML
25 INJECTION, SOLUTION INTRAMUSCULAR; INTRAVENOUS ONCE
OUTPATIENT
Start: 2025-07-08 | End: 2025-07-08

## 2025-06-24 RX ORDER — METOCLOPRAMIDE HYDROCHLORIDE 5 MG/ML
10 INJECTION INTRAMUSCULAR; INTRAVENOUS ONCE
OUTPATIENT
Start: 2025-07-08 | End: 2025-07-08

## 2025-06-24 RX ORDER — METOPROLOL TARTRATE 25 MG/1
25 TABLET, FILM COATED ORAL ONCE
OUTPATIENT
Start: 2025-07-08 | End: 2025-07-08

## 2025-06-24 RX ORDER — EPINEPHRINE 0.3 MG/.3ML
0.3 INJECTION SUBCUTANEOUS EVERY 5 MIN PRN
OUTPATIENT
Start: 2025-07-08

## 2025-06-24 RX ORDER — HEPARIN 100 UNIT/ML
5 SYRINGE INTRAVENOUS AS NEEDED
Status: DISCONTINUED | OUTPATIENT
Start: 2025-06-24 | End: 2025-06-24 | Stop reason: HOSPADM

## 2025-06-24 RX ORDER — HEPARIN 100 UNIT/ML
5 SYRINGE INTRAVENOUS AS NEEDED
OUTPATIENT
Start: 2025-07-08

## 2025-06-24 RX ORDER — KETOROLAC TROMETHAMINE 30 MG/ML
30 INJECTION, SOLUTION INTRAMUSCULAR; INTRAVENOUS ONCE
Status: COMPLETED | OUTPATIENT
Start: 2025-06-24 | End: 2025-06-24

## 2025-06-24 RX ORDER — ALBUTEROL SULFATE 0.83 MG/ML
3 SOLUTION RESPIRATORY (INHALATION) AS NEEDED
OUTPATIENT
Start: 2025-07-08

## 2025-06-24 RX ORDER — ONDANSETRON HYDROCHLORIDE 2 MG/ML
4 INJECTION, SOLUTION INTRAVENOUS ONCE
Status: COMPLETED | OUTPATIENT
Start: 2025-06-24 | End: 2025-06-24

## 2025-06-24 RX ORDER — NITROGLYCERIN 0.4 MG/1
0.4 TABLET SUBLINGUAL ONCE
OUTPATIENT
Start: 2025-07-08 | End: 2025-07-08

## 2025-06-24 RX ADMIN — ONDANSETRON 4 MG: 2 INJECTION INTRAMUSCULAR; INTRAVENOUS at 09:28

## 2025-06-24 RX ADMIN — HEPARIN 500 UNITS: 100 SYRINGE at 10:11

## 2025-06-24 RX ADMIN — Medication: at 09:31

## 2025-06-24 RX ADMIN — KETOROLAC TROMETHAMINE 30 MG: 30 INJECTION, SOLUTION INTRAMUSCULAR at 09:27

## 2025-06-24 RX ADMIN — PROPOFOL 100 MG: 10 INJECTION, EMULSION INTRAVENOUS at 09:32

## 2025-06-24 ASSESSMENT — ENCOUNTER SYMPTOMS
DEPRESSION: 1
LOSS OF SENSATION IN FEET: 1
OCCASIONAL FEELINGS OF UNSTEADINESS: 0

## 2025-06-24 ASSESSMENT — PAIN SCALES - GENERAL: PAINLEVEL_OUTOF10: 6

## 2025-06-24 NOTE — PATIENT INSTRUCTIONS
Today :We administered ondansetron, (ketamine 30 mg, lidocaine (Xylocaine) 300 mg in sodium chloride 0.9% 518 mL IV), propofol (Diprivan), and ketorolac.     For:   1. Fibromyalgia          (Tell all doctors including dentists that you are taking this medication)     Go to the emergency room or call 911 if:  -You have signs of allergic reaction:   -Rash, hives, itching.   -Swollen, blistered, peeling skin.   -Swelling of face, lips, mouth, tongue or throat.   -Tightness of chest, trouble breathing, swallowing or talking     Call your doctor:  - If IV / injection site gets red, warm, swollen, itchy or leaks fluid or pus.     (Leave dressing on your IV site for at least 2 hours and keep area clean and dry  - If you get sick or have symptoms of infection or are not feeling well for any reason.    (Wash your hands often, stay away from people who are sick)  - If you have side effects from your medication that do not go away or are bothersome.     (Refer to the teaching your nurse gave you for side effects to call your doctor about)    - Common side effects may include:  stuffy nose, headache, feeling tired, muscle aches, upset stomach  - Before receiving any vaccines     - Call [facility] at   If:  - You get sick, are on antibiotics, have had a recent vaccine, have surgery or dental work and your doctor wants your visit rescheduled.  - You need to cancel and reschedule your visit for any reason. Call at least 2 days before your visit if you need to cancel.   - Your insurance changes before your next visit.    (We will need to get approval from your new insurance. This can take up to two weeks.)     [facility] is opened Monday thru Friday. We are closed on weekends and holidays.   Voice mail will take your call if the center is closed. If you leave a message please allow 24 hours for a call back during weekdays. If you leave a message on a weekend/holiday, we will call you back the next  business day.    A pharmacist is available Monday - Friday from 8:30AM to 3:30PM to help answer any questions you may have about your prescriptions(s). Please call pharmacy at:    [facility]: [phone]  [facility]: [phone]  [facility]: [phone]              [facility]      Pain Infusion Aftercare Instructions      1. It is normal to feel sedated, tired and low in energy after a pain infusion. DO NOT DRIVE, OPERATE ANY MACHINERY, OR MAKE ANY IMPORTANT DECISIONS FOR AT LEAST 24 HOURS AFTER THE INFUSION.     2. Call the pain center at [phone] with any problems, questions, or concerns.     3. Eat light after the infusion. If you feel queasy or sick to your stomach, laying down with your eyes closed may help. When you resume eating start with something mild like clear liquids, yogurt, applesauce, crackers, etc… Gradually advance to a regular diet.     4. Do not leave your house alone the evening of your pain infusion.     5. No alcohol or sedative medications, such as sleeping pills, for 24 hours after your pain infusion.     6. Resume all other prescribed medications unless directed otherwise by you physician.     7. If you have any medical emergencies, call 911 or go directly to the closest emergency room.

## 2025-06-24 NOTE — PROGRESS NOTES
S: Patient here for 33rd opioid sparing pain infusion. Patient reports 50% reduction in pain after last infusion that lasted 1 week.    Purpose of pain infusion meds explained along with potential side effects.  Patient verbalized understanding.    B: Pain Issues. Is Patient breast feeding: ? no      Is Patient receiving any other form Ketamine from any other facility/ outside clinic ?: no  A: Patient currently has pain described on flow sheet documentation. Designated  is Adrian Connor. Patient last ate solid food 8 hours ago, and had liquid <1 hours ago.    R: Plan; Obtain IV access, do patient risk assessment, and start opioid sparing infusion as ordered. Monitoring for S/S of adverse reactions.    1006  Post infusion teaching provided. Patient verbalized understanding. VSS, Patient states pain is 2/10. Will assist patient to waiting car via wheelchair.

## 2025-07-02 ENCOUNTER — TELEPHONE (OUTPATIENT)
Dept: PHARMACY | Facility: HOSPITAL | Age: 35
End: 2025-07-02
Payer: COMMERCIAL

## 2025-07-02 DIAGNOSIS — E88.819 INSULIN RESISTANCE: ICD-10-CM

## 2025-07-02 NOTE — TELEPHONE ENCOUNTER
"  Patient ID: Sasha Longoria is a 34 y.o. female who presents for No chief complaint on file..    Referring Provider: STEPHANIE Noriega    Pt was referred for weight managment, insulin resistance, interested in Continuous glucose monitor       Subjective     Preferred Pharmacy:    [facility] - [place], [place] - [address]  [address] [address]  Phone: [phone] Fax: [phone]    [facility]/pharmacy #[id number] - [place], [place] - [address] AT INTERSECTION OF [address]  [address] [address]  Phone: [phone] Fax: [phone]     Specialty Pharmacy  [address] [facility] [address] [facility] [address]  Phone: [phone] Fax: [phone]    [facility] [address] [facility]  [address] [place] [id number]  Phone: [phone] Fax: [phone]      Adherence:   Do you have copays on your medications? Yes  Do you have trouble affording your current medications? No, but in \"limbo\" was laid off, still looking for a job. Has previous employers insurance currently through CO[employer] (has ~2 more months remaining).   How many doses of medication did you miss in the last 7 days? 1 dose, morning most often, so many pills, gets distracted and forgets to come back to take all meds.     Subjective/Objective:      Patient identified goal:   Increase endurance (VO2 Max is \"terrible\")  Energy to do ADL, regaining independence  More active, without needing a week of recovery time  More comfortable physically  \"A few years ago\" was at 147 lbs and felt good, today desires to be at a size that is healthy and manageable to maintain.     Onset:   When fibromyalgia was triggered, fall of 2020.   Lifetime of chronic stress and trauma. Traumatic event occurred at this time.   Used to be active, exercising 6 days/week, noticed she couldn't do what she usually could with weight training.     Past success:   X 2 years exercised 6 " "d/week with an active rest day along with caloric restriction.     Support network:   Partner  Therapist  Friends    How long implementing diet/lifestyle change for weight loss:   3 months this time, previously x 2 years.     Disordered eating patterns:   Binge eating disorder without purging - not current- when seeking comfort    Concurrent chronic conditions:   Lab Results   Component Value Date    CHHDL 3.4 11/07/2024    TRIG 82 11/07/2024       Prediabetes/Diabetes? Insulin resistance (TAURUS-IR 4.4)    Pertinent PMH Review:   PMH of Pancreatitis: No  PMH of Gallbladder disease: No  PMH of Delayed Gastric Emptying:  Unsure, hx of stomach cramps/pains/gassy/bloating  PMH of MTC: No  PMH of Retinopathy: No, visits eye doctor regularly because of \"potential retinal detachment in one eye\"   PMH of Urinary Tract Infections: No  PMH of Suicidal Ideation: Yes, past.   Female on oral contraceptive? IUD    Migraines? Yes, 12-13 years ago.   Allergies? Hives/Rash with intensive immune response, has had immune panels that came back negative. Sensitive skin, dermatitis.     Anxiety? Yes    Thyroid health:   Lab Results   Component Value Date    TSH 2.42 11/07/2024        Medications potentially contributing to weight gain:   Antipsychotics/Mood stabilizers/Antiepileptics/Nerve pain: gabapentin,      Medications tried/stopped for weight management:     None, past provider tried Wegovy but insurance denied because also on topiramate.     HPI    Objective     There were no vitals taken for this visit.     Labs  Lab Results   Component Value Date    BILITOT 0.4 11/07/2024    CALCIUM 8.8 11/07/2024    CO2 32 11/07/2024     11/07/2024    CREATININE 0.82 11/07/2024    GLUCOSE 74 11/07/2024    ALKPHOS 77 11/07/2024    K 4.4 11/07/2024    PROT 6.0 (L) 11/07/2024     11/07/2024    AST 12 11/07/2024    ALT 16 11/07/2024    BUN 11 11/07/2024    ANIONGAP 10 11/07/2024    ALBUMIN 4.0 11/07/2024     Lab Results   Component Value " Date    TRIG 82 11/07/2024    CHOL 139 11/07/2024    LDLCALC 82 11/07/2024    HDL 40.4 11/07/2024     Lab Results   Component Value Date    HGBA1C 4.6 11/07/2024    HGBA1C 5.1 02/29/2024    HGBA1C 5.1 02/20/2024     The ASCVD Risk score (Yesenia DK, et al., 2019) failed to calculate for the following reasons:    The 2019 ASCVD risk score is only valid for ages 40 to 79  Lab Results   Component Value Date    VITD25 88 03/04/2025       Current Outpatient Medications on File Prior to Visit   Medication Sig Dispense Refill    alpha lipoic acid 600 mg capsule Take by mouth. 1 capsule daily after meal 1 week  Then 2 capsules for 1 week  Then 3 capsules for 1 week  Then 4 capsules daily.      [START ON 7/12/2025] armodafinil (Nuvigil) 200 mg tablet Take 1 tablet (200 mg) by mouth once daily. Do not fill before July 12, 2025. 30 tablet 1    busPIRone (Buspar) 30 mg tablet Take 1 tablet (30 mg) by mouth 2 times a day. (Patient not taking: Reported on 6/24/2025) 60 tablet 1    cholecalciferol (Vitamin D-3) 25 MCG (1000 UT) capsule Take 2 capsules (50 mcg) by mouth once daily.      coenzyme Q-10 (Co Q-10) 200 mg capsule Take 1 capsule (200 mg) by mouth 3 times a day.      cyclobenzaprine (Flexeril) 5 mg tablet Take 1 tablet (5 mg) by mouth 3 times a day as needed for muscle spasms. 30 tablet 0    desvenlafaxine (Pristiq) 50 mg 24 hr tablet Take 1 tablet (50 mg) by mouth once daily. Do not crush, chew, or split. 30 tablet 1    dihydroergotamine (Trudhesa) 0.725 mg/pump act. (4 mg/mL) nasal spray Administer 1 spray into each nostril every 1 hour if needed for migraine. Dose may be repeated in 1 hour after the first dose. No more than 2 doses within 24 hours or 3 doses within 7 days. 6 each 3    ferrous sulfate, 325 mg ferrous sulfate, tablet Take 1 tablet (325 mg) by mouth once daily with breakfast. 48 tablet 3    fremanezumab (Ajovy Autoinjector) 225 mg/1.5 mL auto-injector Inject 1 Pen (225 mg) under the skin every 28  (twenty-eight) days. 4.5 mL 0    Gas Relief, simethicone, 80 mg chewable tablet CHEW 1 TABLET (80 MG) EVERY 6 HOURS IF NEEDED FOR FLATULENCE.      hydrocortisone 2.5 % ointment APPLY TO AFFECTED AREAS UNDER THE BREAST TWICE DAILY M-F. TAKE WEEKENDS OFF.      hydrOXYzine HCL (Atarax) 10 mg tablet Take 1-2 tablets (10-20 mg) by mouth every 8 hours if needed for anxiety. 120 tablet 0    imiquimod (Aldara) 5 % cream APPLY A THIN LAYER TO THE AFFECTED AREAS OF THE BODY 3 NIGHTS PER WEEK. WASH HANDS AFTER EACH USE.      ketoconazole (NIZOral) 2 % shampoo Lather onto scalp and let sit for 5 mins before rinsing once daily until improvement.  May decrease to once-twice weekly for maintenance.      levonorgestrel (Mirena) 21 mcg/24 hours (8 yrs) 52 mg IUD by intrauterine route.      MAGNESIUM GLYCINATE ORAL Take 1 capsule by mouth once daily.      norethindrone (Aygestin) 5 mg tablet Take 1 tablet (5 mg) by mouth once daily. 90 tablet 3    ondansetron (Zofran) 4 mg tablet Take 2 tablets (8 mg) by mouth every 8 hours if needed for nausea or vomiting. 20 tablet 1    prazosin (Minipress) 2 mg capsule Take 1 capsule (2 mg) by mouth once daily at bedtime. 30 capsule 1    sulfaSALAzine (Azulfidine) 500 mg DR tablet Take 3 tablets (1,500 mg) by mouth once daily. Do not crush, chew, or split. (Patient not taking: Reported on 6/24/2025)      sulfaSALAzine (Azulfidine) 500 mg DR tablet Take 3 tablets (1,500 mg) by mouth twice a day. (Patient taking differently: Take 4 tablets (2,000 mg) by mouth twice a day.)      tirzepatide, weight loss, (Zepbound) 7.5 mg/0.5 mL injection Inject 7.5 mg under the skin every 7 days. 12 each 3    vilazodone (Viibryd) 40 mg tablet Take 1 tablet (40 mg) by mouth once daily. (Patient not taking: Reported on 6/24/2025) 30 tablet 1     Current Facility-Administered Medications on File Prior to Visit   Medication Dose Route Frequency Provider Last Rate Last Admin    heparin flush 100 unit/mL syringe 500 Units   500 Units intra-catheter PRN Dre Justice MD   500 Units at 03/04/25 1012    heparin lock flush (porcine) 10 unit/mL injection 10 Units  10 Units intra-catheter Once Dre Justice MD            Medication and allergy reconciliation completed     Drug Interactions       Assessment/Plan   Zepbound  Tolerating 7.5 mg dose well  Had been at 245 to 250 lbs  Since starting 7.5mg has been between 241-244 lbs  Continues with insurance run weight loss program  Regular weigh ins with their provided scale and completes weekly lessons  Has been helping her partner move and went through a big change in medication which caused substantial pain, but has improved since then. Trying to find lowest effective doses of medications.   Hasn't been doing strength training otherwise  Recommend In Body Scan to establish baseline to help with ensuring we maintain muscle mass  Continue to prioritize protein (20-30 grams at each meal)  Patient emailed requesting dose increase to 10mg, sending today     Working with Wendy Aguilar DC for Functional Medicine  Currently taking many supplements       INCREASE  Zepbound 10mg once weekly  **Update [facility] pharmacy is out of network, patient would prefer rx is sent [facility].    -Today patient requests rx [facility]    Follow-up: 07/23/25 10am     Time spent with pt: Total length of time 5 (minutes) of the encounter and more than 50% was spent counseling the patient.      Briseida Polanco, Pharm.D, [facility], [facility]  Clinical Pharmacist  Pharmacy Services  [phone]    Continue all meds under the continuation of care with the referring provider and clinical pharmacy team.    Verbal consent to manage patient's drug therapy was obtained from the patient and/or an individual authorized to act on behalf of a patient. They were informed they may decline to participate or withdraw from participation in pharmacy services at any time.

## 2025-07-04 DIAGNOSIS — Z91.89 AT RISK FOR NAUSEA: ICD-10-CM

## 2025-07-08 DIAGNOSIS — F41.1 GENERALIZED ANXIETY DISORDER: ICD-10-CM

## 2025-07-08 DIAGNOSIS — F43.10 PTSD (POST-TRAUMATIC STRESS DISORDER): ICD-10-CM

## 2025-07-08 DIAGNOSIS — F33.1 MODERATE EPISODE OF RECURRENT MAJOR DEPRESSIVE DISORDER: ICD-10-CM

## 2025-07-08 RX ORDER — DESVENLAFAXINE 50 MG/1
50 TABLET, FILM COATED, EXTENDED RELEASE ORAL DAILY
Qty: 90 TABLET | Refills: 1 | Status: SHIPPED | OUTPATIENT
Start: 2025-07-08

## 2025-07-08 RX ORDER — ONDANSETRON 4 MG/1
8 TABLET, FILM COATED ORAL EVERY 8 HOURS PRN
Qty: 9 TABLET | Refills: 3 | Status: SHIPPED | OUTPATIENT
Start: 2025-07-08

## 2025-07-10 DIAGNOSIS — M79.7 FIBROMYALGIA: Primary | ICD-10-CM

## 2025-07-10 RX ORDER — HEPARIN 100 UNIT/ML
5 SYRINGE INTRAVENOUS AS NEEDED
OUTPATIENT
Start: 2025-07-17

## 2025-07-10 RX ORDER — KETOROLAC TROMETHAMINE 30 MG/ML
30 INJECTION, SOLUTION INTRAMUSCULAR; INTRAVENOUS ONCE
OUTPATIENT
Start: 2025-07-17 | End: 2025-07-17

## 2025-07-15 ENCOUNTER — APPOINTMENT (OUTPATIENT)
Dept: BEHAVIORAL HEALTH | Facility: CLINIC | Age: 35
End: 2025-07-15
Payer: COMMERCIAL

## 2025-07-15 DIAGNOSIS — F41.1 GENERALIZED ANXIETY DISORDER: ICD-10-CM

## 2025-07-15 DIAGNOSIS — F43.10 PTSD (POST-TRAUMATIC STRESS DISORDER): ICD-10-CM

## 2025-07-15 DIAGNOSIS — F43.12 NIGHTMARES ASSOCIATED WITH CHRONIC POST-TRAUMATIC STRESS DISORDER: ICD-10-CM

## 2025-07-15 DIAGNOSIS — F51.5 NIGHTMARES ASSOCIATED WITH CHRONIC POST-TRAUMATIC STRESS DISORDER: ICD-10-CM

## 2025-07-15 DIAGNOSIS — F33.1 MODERATE EPISODE OF RECURRENT MAJOR DEPRESSIVE DISORDER: ICD-10-CM

## 2025-07-15 DIAGNOSIS — G47.11 IDIOPATHIC HYPERSOMNIA: ICD-10-CM

## 2025-07-15 PROCEDURE — 99214 OFFICE O/P EST MOD 30 MIN: CPT

## 2025-07-15 PROCEDURE — 1036F TOBACCO NON-USER: CPT

## 2025-07-15 RX ORDER — DESVENLAFAXINE 100 MG/1
100 TABLET, EXTENDED RELEASE ORAL DAILY
Qty: 30 TABLET | Refills: 1 | Status: SHIPPED | OUTPATIENT
Start: 2025-07-15 | End: 2025-09-13

## 2025-07-15 RX ORDER — HYDROXYZINE HYDROCHLORIDE 25 MG/1
25 TABLET, FILM COATED ORAL EVERY 8 HOURS PRN
Qty: 90 TABLET | Refills: 1 | Status: SHIPPED | OUTPATIENT
Start: 2025-07-15 | End: 2025-09-13

## 2025-07-15 RX ORDER — PRAZOSIN HYDROCHLORIDE 5 MG/1
5 CAPSULE ORAL NIGHTLY
Qty: 30 CAPSULE | Refills: 1 | Status: SHIPPED | OUTPATIENT
Start: 2025-07-15 | End: 2025-09-13

## 2025-07-15 ASSESSMENT — ENCOUNTER SYMPTOMS
HEMATOCHEZIA: 0
DIARRHEA: 0
FREQUENCY: 1
CONSTIPATION: 1
VOMITING: 0
BELCHING: 1
WEIGHT LOSS: 0
ABDOMINAL PAIN: 1
FEVER: 0
HEADACHES: 1
FLATUS: 1
ARTHRALGIAS: 1
NAUSEA: 1
MYALGIAS: 1
ANOREXIA: 0
DYSURIA: 0
HEMATURIA: 0

## 2025-07-15 NOTE — PROGRESS NOTES
"Sasha Longoria is a 34 y.o. female patient presenting for a(an) virtual FUV  Visit location: patient (Sasha Longoria, home), provider (LAINEY RaphaelCNP, virtual office)  Pt identify self by name, , and address    Chief Complaint   Patient presents with    Excessive daytime sleepiness    Anxiety    Depression    PTSD (Post-Traumatic Stress Disorder)    Follow-up    Med Management     HPI    07/15/2025  \"Not noticed any additional change with Prazosin 2mg in the past few weeks.\"  \"No change with taking Hydroxyzine  Successfully stopped Viibryd and Buspirone d/t ineffectiveness  Continue to struggle with nightmares on a nearly daily basis  VSS - 2025  Continue to take Pristiq 50 mg daily  Denies flashbacks associated with nightmares  Sleeping between 5-8 hours - interrupted by nightmares, wakes up not feeling rested  Anxiety/depression - minimal to no changes since last visit  Denies any noticeable side effects from the meds.   Denies panic attacks or mood swings  Denies substance use  Denies SI/HI/AVH/delusions/shira/hypomania  Appetite remains unaffected    Current S/Sx:  -Mood swings/disorder or bipolar symptoms:   - stable     Depression (single, recurrent, seasonal, specific intermittency):   -Other depressive sx/s: improving fatigue/motivation/brain fog  -Fatigue/Energy: improving  -Motivation: improving   -Concentration: improving \"brain fog\"   -Feeling hope/help/worthless: denies  -Sleep: sleeps vivid dreams overnight since 2024  -Appetite/Weight Changes: fluctuates appetite, no weight changes  - PHQ-9 (13)    SI/HI  -Reports passive hx of SI without intent/plan. Denies current suicidal intend/plan  -CSSRS Low risks    Weapon safety  -Guns/Weapons at home: denies    Psychosis/schizophrenia  - Psychosis: denies hallucinations/delusions, denies paranoia  - Intrusive thoughts: Denies    Anxiety: (generalized, social, agoraphobia, speaking, specific):   -Worry excessively: " present/impactful - especially about relationships  -NINA-7 (14)    PTSD  - Flashbacks: Denies  -Nightmares: daily vivid dreams, nightmares - common  -Other symptoms: hx of night terrors (continue to occur 1-2 times/month), denies night terrors since last visit    OCD  - Obsessive/compulsive thoughts/acts: not assessed    ADHD  - states she was previously assessed by [facility] and  - found not to have ADHD. Foggy brain, poor concentration/fatigue/low motivation might be caused by over medication  - BAARS-IV Questionnaire: not administered     Panic attacks  Denies panic attacks since last visit     HISTORY  PSYCH HISTORY  -Psych Hx: (dx with anxiety & depression - 2009), cPTSD (2020)/sleep disturbance (2016)  -Psych Hospitalization Hx: denies  -Suicide Attempt Hx: denies  -Self-Harm/Violence Hx: denies  -Current psych meds: Pristiq 50 mg daily, prazosin 2 mg nightly, armodafinil 200 mg daily, Atarax 10 to 20 mg up to 3 times daily as needed for severe anxiety  -Psych Med Hx: Vyvanse 20 (noticed minimal improvement), Strattera (25, 40, 80 mg - minimally beneficial). Have tried Effexor ineffective), Abilify (2009/2010). Stopped Wellbutrin with Abilify around 2010/2011 because therapy was effective. States when she resumed care with psychology (2020) & psychiatry (2021), was placed on Zoloft, became in effective overtime, Wellbutrin was re-added in 2023/2024. Have tried Trazodone (taken for several yrs - inconsistently effective), Mirtazapine (inconsistently helpful) - both ineffective. Was prescribed Prazosin 2 mg (stopped taking because it didn't help much). Just stopped Gabapentin (caused side effects). Weaned off Wellbutrin (2025), Cymbalta (2025) - GeneSight results (severe interaction), modafinil 200 mg (effective), buspirone 30 mg twice daily (effective), Viibryd 40 mg (effective)Wellbutrin  mg daily (2009/2010) (reduced from 300 mg d/t concerns of side effects of taking alongside  other meds), Cymbalta 60 mg daily (reduced from 120 mg d/t side effects of sexual dysfunction related - symptoms are more manageable now).     SUBSTANCE USE HISTORY  -Substance Use Hx: denies  -ETOH: rarely  -Tobacco: denies  -Caffeine: coffee  -Substance Abuse Treatment Hx: denies     FAMILY HISTORY  -Family Psych Hx: all 6 siblings: depression, anxiety. Mom: depression. Father: depression/undx bipolar  -Family Suicide Hx: denies  -Family Substance Abuse Hx: mom's side: struggled with alcoholism, cannabis, narcotics, cocaine, father: abused narcotics, cocaine. Sister: struggled with heroin use     SOCIAL HISTORY  -Upbringing: Grew up with both parents. Has 6 siblings  -income: denies financial struggles  -safety: feels safe at home/generally  -Support system: average support system  -Trauma: Mixed childhood/adulthood, poor memory of possible childhood trauma. Denies physical trauma  -Education: bachelors  -Work: compliance office  -Marital Status: , has current partner  -Children: none  -Living situation: lives alone, partner around 1/2 time/week  -: denies  -Legal: denies      MEDICAL HISTORY  -PCP: DONALD Palmer-CNP  -TBI/head trauma/LOC/seizure hx: mild head injury in 2013, fell down stairs and hit head     REVIEW OF SYSTEMS  Review of Systems   Constitutional:         Fibromyalgia & PCOS, MIKAELA (prescribed CPAP- still finding the right type of mask)   All other systems reviewed and are negative.       PHYSICAL EXAM  Physical Exam  Psychiatric:         Attention and Perception: Attention and perception normal.         Mood and Affect: Mood is anxious and depressed. Affect is flat.         Speech: Speech normal.         Behavior: Behavior normal. Behavior is cooperative.         Thought Content: Thought content normal.         Cognition and Memory: Cognition and memory normal.         Judgment: Judgment normal.          IMPRESSION  Sasha Longoria is a 34 y.o. female patient  presents virtual FUV.  Sasha continue to struggle with debilitating anxiety, depression, nightmares not resolved by current dose of prazosin.  Remain adherent to Pristiq 50 mg daily, prazosin 2 mg nightly, hydroxyzine 10 to 20 mg as needed for severe anxiety and armodafinil 200 mg daily.  Daytime sleepiness has improved with armodafinil but not optimal, however, unstable anxiety, depression, and PTSD related symptoms appear significantly impactful and continue to contribute to excessive daytime sleepiness.  Successfully weaned off Viibryd and buspirone due to ineffectiveness.    Denies side effects from medications.   Appetite remains unaffected.  Denies flashbacks.  Denies substance use.  Denies SI/HI/AVH/delusions/shira/hypomania. Discussed to increase Pristiq for anxiety, depression, and PTSD related symptoms.  Continue armodafinil 200 mg for hypersomnia/attention deficit related symptoms.  Increase prazosin to further improve nightmares.  Increase hydroxyzine for overwhelming anxiety.  Continue weekly counseling.  Will follow up with this provider in 4 weeks to continue monitoring, or sooner if needed.      Plan:     1. Reviewed diagnostic impression including subjective and objective data and provided education about NINA, MDD, PTSD, sleep disturbance, ADHD, panic attacks, bipolar disorder, and substance use/abuse, etiology, treatment recommendations including medication, therapy, course of treatment and prognosis. Patient amenable to treatment plan.     2. Safety Assessment: History of passive thoughts without intent/plan (2020), but denies current thoughts of SI, plan/intent.  No h/o SA. RF include , unmarried/single, living alone/lack of social support, history of trauma/abuse, chronic medical illness, chronic pain, current psychiatric illness, feelings of hopelessness, and panic attacks. PF include strong coping skills, hopefulness/future orientation, and employment, engaged in care, future  oriented, no guns.  Medium imminent risk     3. @  Problem List Items Addressed This Visit       PTSD (post-traumatic stress disorder)    Relevant Medications    desvenlafaxine (Pristiq) 100 mg 24 hr tablet    Generalized anxiety disorder    Relevant Medications    desvenlafaxine (Pristiq) 100 mg 24 hr tablet    hydrOXYzine HCL (Atarax) 25 mg tablet    Depression    Relevant Medications    desvenlafaxine (Pristiq) 100 mg 24 hr tablet    hydrOXYzine HCL (Atarax) 25 mg tablet    Idiopathic hypersomnia     Other Visit Diagnoses         Nightmares associated with chronic post-traumatic stress disorder        Relevant Medications    prazosin (Minipress) 5 mg capsule          CONTINUE Armodafinil 200 mg daily (filled until 9/10/25)  INCREASE Prazosin 5 mg daily - nightmares  INCREASE Pristiq 100 mg daily - starting 6/18/25 (or day after weaning off Viibryd)  INCREASE Atarax 25 mg, take 1 tabs up to 3 times daily as needed for severe anxiety     Reviewed r/b/a, possible side effects of the medication. Client is aware about the benefit outweighs the risk.     4. INDIVIDUAL THERAPY: Private [facility] - [facility], weekly (~4yrs)     5. OARRS: last filled Armodafinil 200 mg on 05/14/2025 (30 tabs x 30 days), Modafinil on 04/28/2025 , Gabapentin 300 mg on 12/12/2024 (270 caps x 90 days), Temazepam 7.5 mg on 11/11/2024      6. Labs reviewed    7. Last Urine Drug Screen  Date of Last Screen: 3/20/2025    8. Controlled Substance Agreement:  Date of the Last Agreement: 3/5/2025  Reviewed Controlled Substance Agreement including but not limited to the benefits, risks, and alternatives to treatment with a Controlled Substance medication(s).    9. Follow-up with this provider in 4 weeks.     10. -Total time: 22 minutes via virtual     - Follow up with physical health providers as scheduled  - May follow up sooner if experiences worsening symptoms by calling  Psychiatry at [phone]  - Patient verbalized an understanding to call  Mobile Crisis at [phone] ([facility]), 211, or 911/go to the nearest emergency room if experiences thoughts of harm to self or others.

## 2025-07-17 ENCOUNTER — INFUSION (OUTPATIENT)
Dept: INFUSION THERAPY | Facility: CLINIC | Age: 35
End: 2025-07-17
Payer: COMMERCIAL

## 2025-07-17 VITALS
TEMPERATURE: 98.2 F | SYSTOLIC BLOOD PRESSURE: 108 MMHG | DIASTOLIC BLOOD PRESSURE: 71 MMHG | HEART RATE: 84 BPM | RESPIRATION RATE: 16 BRPM | OXYGEN SATURATION: 98 %

## 2025-07-17 DIAGNOSIS — M79.7 FIBROMYALGIA: ICD-10-CM

## 2025-07-17 LAB — PREGNANCY TEST URINE, POC: NEGATIVE

## 2025-07-17 PROCEDURE — 96368 THER/DIAG CONCURRENT INF: CPT | Mod: INF

## 2025-07-17 PROCEDURE — 96375 TX/PRO/DX INJ NEW DRUG ADDON: CPT | Mod: INF

## 2025-07-17 PROCEDURE — 2500000004 HC RX 250 GENERAL PHARMACY W/ HCPCS (ALT 636 FOR OP/ED): Performed by: NURSE PRACTITIONER

## 2025-07-17 PROCEDURE — 81025 URINE PREGNANCY TEST: CPT

## 2025-07-17 PROCEDURE — 96365 THER/PROPH/DIAG IV INF INIT: CPT | Mod: INF

## 2025-07-17 RX ORDER — KETOROLAC TROMETHAMINE 30 MG/ML
30 INJECTION, SOLUTION INTRAMUSCULAR; INTRAVENOUS ONCE
OUTPATIENT
Start: 2025-07-31 | End: 2025-07-31

## 2025-07-17 RX ORDER — METOCLOPRAMIDE HYDROCHLORIDE 5 MG/ML
10 INJECTION INTRAMUSCULAR; INTRAVENOUS ONCE
OUTPATIENT
Start: 2025-07-31 | End: 2025-07-31

## 2025-07-17 RX ORDER — DIPHENHYDRAMINE HYDROCHLORIDE 50 MG/ML
50 INJECTION, SOLUTION INTRAMUSCULAR; INTRAVENOUS AS NEEDED
OUTPATIENT
Start: 2025-07-31

## 2025-07-17 RX ORDER — EPINEPHRINE 0.3 MG/.3ML
0.3 INJECTION SUBCUTANEOUS EVERY 5 MIN PRN
OUTPATIENT
Start: 2025-07-31

## 2025-07-17 RX ORDER — KETOROLAC TROMETHAMINE 30 MG/ML
30 INJECTION, SOLUTION INTRAMUSCULAR; INTRAVENOUS ONCE
Status: COMPLETED | OUTPATIENT
Start: 2025-07-17 | End: 2025-07-17

## 2025-07-17 RX ORDER — HEPARIN 100 UNIT/ML
5 SYRINGE INTRAVENOUS AS NEEDED
OUTPATIENT
Start: 2025-07-31

## 2025-07-17 RX ORDER — ONDANSETRON HYDROCHLORIDE 2 MG/ML
4 INJECTION, SOLUTION INTRAVENOUS ONCE
Status: COMPLETED | OUTPATIENT
Start: 2025-07-17 | End: 2025-07-17

## 2025-07-17 RX ORDER — HEPARIN 100 UNIT/ML
5 SYRINGE INTRAVENOUS AS NEEDED
Status: DISCONTINUED | OUTPATIENT
Start: 2025-07-17 | End: 2025-07-17 | Stop reason: HOSPADM

## 2025-07-17 RX ORDER — FAMOTIDINE 10 MG/ML
20 INJECTION, SOLUTION INTRAVENOUS ONCE AS NEEDED
OUTPATIENT
Start: 2025-07-31

## 2025-07-17 RX ORDER — METOPROLOL TARTRATE 25 MG/1
25 TABLET, FILM COATED ORAL ONCE
OUTPATIENT
Start: 2025-07-31 | End: 2025-07-31

## 2025-07-17 RX ORDER — DIPHENHYDRAMINE HYDROCHLORIDE 50 MG/ML
25 INJECTION, SOLUTION INTRAMUSCULAR; INTRAVENOUS ONCE
OUTPATIENT
Start: 2025-07-31 | End: 2025-07-31

## 2025-07-17 RX ORDER — ONDANSETRON HYDROCHLORIDE 2 MG/ML
4 INJECTION, SOLUTION INTRAVENOUS ONCE
OUTPATIENT
Start: 2025-07-31 | End: 2025-07-31

## 2025-07-17 RX ORDER — NITROGLYCERIN 0.4 MG/1
0.4 TABLET SUBLINGUAL ONCE
OUTPATIENT
Start: 2025-07-31 | End: 2025-07-31

## 2025-07-17 RX ORDER — ALBUTEROL SULFATE 0.83 MG/ML
3 SOLUTION RESPIRATORY (INHALATION) AS NEEDED
OUTPATIENT
Start: 2025-07-31

## 2025-07-17 RX ADMIN — Medication: at 08:25

## 2025-07-17 RX ADMIN — ONDANSETRON 4 MG: 2 INJECTION INTRAMUSCULAR; INTRAVENOUS at 08:25

## 2025-07-17 RX ADMIN — PROPOFOL 100 MG: 10 INJECTION, EMULSION INTRAVENOUS at 08:25

## 2025-07-17 RX ADMIN — KETOROLAC TROMETHAMINE 30 MG: 30 INJECTION, SOLUTION INTRAMUSCULAR at 08:25

## 2025-07-17 ASSESSMENT — ENCOUNTER SYMPTOMS
LOSS OF SENSATION IN FEET: 1
OCCASIONAL FEELINGS OF UNSTEADINESS: 0
DEPRESSION: 0

## 2025-07-17 ASSESSMENT — PAIN - FUNCTIONAL ASSESSMENT
PAIN_FUNCTIONAL_ASSESSMENT: 0-10
PAIN_FUNCTIONAL_ASSESSMENT: 0-10

## 2025-07-17 ASSESSMENT — PAIN SCALES - GENERAL
PAINLEVEL_OUTOF10: 0 - NO PAIN
PAINLEVEL_OUTOF10: 7

## 2025-07-17 NOTE — PATIENT INSTRUCTIONS
Today :We administered (ketamine 30 mg, lidocaine (Xylocaine) 300 mg in sodium chloride 0.9% 518 mL IV), propofol (Diprivan), ketorolac, and ondansetron.     For:   1. Fibromyalgia         Your next appointment is due in:  2 weeks        Please read the  Medication Guide that was given to you and reviewed during todays visit.     (Tell all doctors including dentists that you are taking this medication)     Go to the emergency room or call 911 if:  -You have signs of allergic reaction:   -Rash, hives, itching.   -Swollen, blistered, peeling skin.   -Swelling of face, lips, mouth, tongue or throat.   -Tightness of chest, trouble breathing, swallowing or talking     Call your doctor:  - If IV / injection site gets red, warm, swollen, itchy or leaks fluid or pus.     (Leave dressing on your IV site for at least 2 hours and keep area clean and dry  - If you get sick or have symptoms of infection or are not feeling well for any reason.    (Wash your hands often, stay away from people who are sick)  - If you have side effects from your medication that do not go away or are bothersome.     (Refer to the teaching your nurse gave you for side effects to call your doctor about)    - Common side effects may include:  stuffy nose, headache, feeling tired, muscle aches, upset stomach  - Before receiving any vaccines     - Call the [facility] at   If:  - You get sick, are on antibiotics, have had a recent vaccine, have surgery or dental work and your doctor wants your visit rescheduled.  - You need to cancel and reschedule your visit for any reason. Call at least 2 days before your visit if you need to cancel.   - Your insurance changes before your next visit.    (We will need to get approval from your new insurance. This can take up to two weeks.)     The [facility] is opened Monday thru Friday. We are closed on weekends and holidays.   Voice mail will take your call if the center is closed. If  you leave a message please allow 24 hours for a call back during weekdays. If you leave a message on a weekend/holiday, we will call you back the next business day.    A pharmacist is available Monday - Friday from 8:30AM to 3:30PM to help answer any questions you may have about your prescriptions(s). Please call pharmacy at:    [facility]: [phone]  [facility]: [phone]  [facility]: [phone]              [facility]      Pain Infusion Aftercare Instructions      1. It is normal to feel sedated, tired and low in energy after a pain infusion. DO NOT DRIVE, OPERATE ANY MACHINERY, OR MAKE ANY IMPORTANT DECISIONS FOR AT LEAST 24 HOURS AFTER THE INFUSION.     2. Call the pain center at [phone] with any problems, questions, or concerns.     3. Eat light after the infusion. If you feel queasy or sick to your stomach, laying down with your eyes closed may help. When you resume eating start with something mild like clear liquids, yogurt, applesauce, crackers, etc… Gradually advance to a regular diet.     4. Do not leave your house alone the evening of your pain infusion.     5. No alcohol or sedative medications, such as sleeping pills, for 24 hours after your pain infusion.     6. Resume all other prescribed medications unless directed otherwise by you physician.     7. If you have any medical emergencies, call 911 or go directly to the closest emergency room.

## 2025-07-17 NOTE — PROGRESS NOTES
S: Patient here for 35 opioid sparing pain infusion. Patient reports 50% reduction in pain after last infusion that lasted 1 1/2 weeks.    Purpose of pain infusion meds explained along with potential side effects.  Patient verbalized understanding.    B: Pain Issues. Is Patient breast feeding: no      Is Patient receiving any other form Ketamine from any other facility/ outside clinic ?: no  A: Patient currently has pain described on flow sheet documentation. Designated  is Adrian. Patient last ate solid food 12 hours ago, and had liquid 12 hours ago.    R: Plan; Obtain IV access, do patient risk assessment, and start opioid sparing infusion as ordered. Monitoring for S/S of adverse reactions.

## 2025-07-22 DIAGNOSIS — K59.00 CONSTIPATION, UNSPECIFIED CONSTIPATION TYPE: Primary | ICD-10-CM

## 2025-07-22 RX ORDER — SENNOSIDES 8.6 MG/1
1 TABLET ORAL DAILY
Qty: 30 TABLET | Refills: 11 | Status: SHIPPED | OUTPATIENT
Start: 2025-07-22 | End: 2026-07-22

## 2025-07-23 ENCOUNTER — TELEPHONE (OUTPATIENT)
Dept: PHARMACY | Facility: HOSPITAL | Age: 35
End: 2025-07-23

## 2025-07-23 ENCOUNTER — APPOINTMENT (OUTPATIENT)
Dept: PHARMACY | Facility: HOSPITAL | Age: 35
End: 2025-07-23
Payer: COMMERCIAL

## 2025-07-23 DIAGNOSIS — E88.819 INSULIN RESISTANCE: ICD-10-CM

## 2025-07-23 NOTE — TELEPHONE ENCOUNTER
"  Patient ID: Sasha Longoria is a 34 y.o. female who presents for No chief complaint on file..    Referring Provider: STEPHANIE Noriega    Pt was referred for weight managment, insulin resistance, interested in Continuous glucose monitor       Subjective     Preferred Pharmacy:    [facility] - [place], [place] - [address]  [address] [facility] [address]  Phone: [phone] Fax: [phone]    [id number] - [place], [place] - [address] AT [address]SECTION OF [address]  [address] [facility] [address]  Phone: [phone] Fax: [phone]     Specialty Pharmacy  [address] [facility] [address]  Phone: [phone] Fax: [phone]    [facility] [address] [place] [address]  [address] [place] [address]  Phone: [phone] Fax: [phone]      Subjective/Objective:      Patient identified goal:   Increase endurance (VO2 Max is \"terrible\")  Energy to do ADL, regaining independence  More active, without needing a week of recovery time  More comfortable physically  \"A few years ago\" was at 147 lbs and felt good, today desires to be at a size that is healthy and manageable to maintain.     Onset:   When fibromyalgia was triggered, fall of 2020.   Lifetime of chronic stress and trauma. Traumatic event occurred at this time.   Used to be active, exercising 6 days/week, noticed she couldn't do what she usually could with weight training.     Past success:   X 2 years exercised 6 d/week with an active rest day along with caloric restriction.     Support network:   Partner  Therapist  Friends    How long implementing diet/lifestyle change for weight loss:   3 months this time, previously x 2 years.     Disordered eating patterns:   Binge eating disorder without purging - not current- when seeking comfort    Concurrent chronic conditions:   Lab Results   Component Value Date    [facility] 3.4 11/07/2024    TRIG 82 " "11/07/2024       Prediabetes/Diabetes? Insulin resistance (TAURUS-IR 4.4)    Pertinent PMH Review:   PMH of Pancreatitis: No  PMH of Gallbladder disease: No  PMH of Delayed Gastric Emptying:  Unsure, hx of stomach cramps/pains/gassy/bloating  PMH of MTC: No  PMH of Retinopathy: No, visits eye doctor regularly because of \"potential retinal detachment in one eye\"   PMH of Urinary Tract Infections: No  PMH of Suicidal Ideation: Yes, past.   Female on oral contraceptive? IUD    Migraines? Yes, 12-13 years ago.   Allergies? Hives/Rash with intensive immune response, has had immune panels that came back negative. Sensitive skin, dermatitis.     Anxiety? Yes    Thyroid health:   Lab Results   Component Value Date    TSH 2.42 11/07/2024        Medications potentially contributing to weight gain:   Antipsychotics/Mood stabilizers/Antiepileptics/Nerve pain: gabapentin,      Medications tried/stopped for weight management:     None, past provider tried Wegovy but insurance denied because also on topiramate.     HPI    Objective     There were no vitals taken for this visit.     Labs  Lab Results   Component Value Date    BILITOT 0.4 11/07/2024    CALCIUM 8.8 11/07/2024    CO2 32 11/07/2024     11/07/2024    CREATININE 0.82 11/07/2024    GLUCOSE 74 11/07/2024    ALKPHOS 77 11/07/2024    K 4.4 11/07/2024    PROT 6.0 (L) 11/07/2024     11/07/2024    AST 12 11/07/2024    ALT 16 11/07/2024    BUN 11 11/07/2024    ANIONGAP 10 11/07/2024    ALBUMIN 4.0 11/07/2024     Lab Results   Component Value Date    TRIG 82 11/07/2024    CHOL 139 11/07/2024    LDLCALC 82 11/07/2024    HDL 40.4 11/07/2024     Lab Results   Component Value Date    HGBA1C 4.6 11/07/2024    HGBA1C 5.1 02/29/2024    HGBA1C 5.1 02/20/2024     The ASCVD Risk score (Yesenia JACKSON, et al., 2019) failed to calculate for the following reasons:    The 2019 ASCVD risk score is only valid for ages 40 to 79  Lab Results   Component Value Date    VITD25 88 03/04/2025 "       Current Outpatient Medications on File Prior to Visit   Medication Sig Dispense Refill    alpha lipoic acid 600 mg capsule Take by mouth. 1 capsule daily after meal 1 week  Then 2 capsules for 1 week  Then 3 capsules for 1 week  Then 4 capsules daily.      armodafinil (Nuvigil) 200 mg tablet Take 1 tablet (200 mg) by mouth once daily. Do not fill before July 12, 2025. 30 tablet 1    cholecalciferol (Vitamin D-3) 25 MCG (1000 UT) capsule Take 2 capsules (50 mcg) by mouth once daily.      coenzyme Q-10 (Co Q-10) 200 mg capsule Take 1 capsule (200 mg) by mouth 3 times a day.      desvenlafaxine (Pristiq) 100 mg 24 hr tablet Take 1 tablet (100 mg) by mouth once daily. Do not crush, chew, or split. 30 tablet 1    dihydroergotamine (Trudhesa) 0.725 mg/pump act. (4 mg/mL) nasal spray Administer 1 spray into each nostril every 1 hour if needed for migraine. Dose may be repeated in 1 hour after the first dose. No more than 2 doses within 24 hours or 3 doses within 7 days. 6 each 3    ferrous sulfate, 325 mg ferrous sulfate, tablet Take 1 tablet (325 mg) by mouth once daily with breakfast. 48 tablet 3    fremanezumab (Ajovy Autoinjector) 225 mg/1.5 mL auto-injector Inject 1 Pen (225 mg) under the skin every 28 (twenty-eight) days. 4.5 mL 0    Gas Relief, simethicone, 80 mg chewable tablet CHEW 1 TABLET (80 MG) EVERY 6 HOURS IF NEEDED FOR FLATULENCE.      hydrocortisone 2.5 % ointment APPLY TO AFFECTED AREAS UNDER THE BREAST TWICE DAILY M-F. TAKE WEEKENDS OFF.      hydrOXYzine HCL (Atarax) 25 mg tablet Take 1 tablet (25 mg) by mouth every 8 hours if needed for anxiety. 90 tablet 1    imiquimod (Aldara) 5 % cream APPLY A THIN LAYER TO THE AFFECTED AREAS OF THE BODY 3 NIGHTS PER WEEK. WASH HANDS AFTER EACH USE.      ketoconazole (NIZOral) 2 % shampoo Lather onto scalp and let sit for 5 mins before rinsing once daily until improvement.  May decrease to once-twice weekly for maintenance.      levonorgestrel (Mirena) 21  mcg/24 hours (8 yrs) 52 mg IUD by intrauterine route.      MAGNESIUM GLYCINATE ORAL Take 1 capsule by mouth once daily.      norethindrone (Aygestin) 5 mg tablet Take 1 tablet (5 mg) by mouth once daily. 90 tablet 3    ondansetron (Zofran) 4 mg tablet TAKE 2 TABLETS (8 MG) BY MOUTH EVERY 8 HOURS IF NEEDED FOR NAUSEA OR VOMITING. 9 tablet 3    prazosin (Minipress) 5 mg capsule Take 1 capsule (5 mg) by mouth once daily at bedtime. 30 capsule 1    sennosides (senna) 8.6 mg tablet Take 1 tablet (8.6 mg) by mouth once daily. 30 tablet 11    sulfaSALAzine (Azulfidine) 500 mg DR tablet Take 3 tablets (1,500 mg) by mouth once daily. Do not crush, chew, or split.      [] sulfaSALAzine (Azulfidine) 500 mg DR tablet Take 3 tablets (1,500 mg) by mouth twice a day. (Patient taking differently: Take 4 tablets (2,000 mg) by mouth twice a day.)      tirzepatide, weight loss, (Zepbound) 10 mg/0.5 mL injection Inject 10 mg under the skin every 7 days. 12 each 3    [DISCONTINUED] tirzepatide, weight loss, (Zepbound) 10 mg/0.5 mL injection Inject 10 mg under the skin every 7 days. 12 each 3     Current Facility-Administered Medications on File Prior to Visit   Medication Dose Route Frequency Provider Last Rate Last Admin    heparin flush 100 unit/mL syringe 500 Units  500 Units intra-catheter PRN Dre Justice MD   500 Units at 25 1012    heparin lock flush (porcine) 10 unit/mL injection 10 Units  10 Units intra-catheter Once Dre Justice MD            Medication and allergy reconciliation completed     Drug Interactions       Assessment/Plan   Zepbound  Tolerating 7.5 mg dose well, plan is to increase to 10mg once weekly.   Starting weight: 279.9 lbs  Current weight: 227 lbs  Overall weight loss is -52.0 lbs   She continues to participate in the [employer] program and has been making diet and lifestyle changes.           INCREASE  Zepbound 10mg once weekly  **Update [facility] pharmacy is out of network, patient would prefer  rx is sent to [facility].    -Today patient requests rx sent to [facility]      Next follow up due in September for further refills.      Time spent with pt: Total length of time 15 (minutes) of the encounter and more than 50% was spent counseling the patient.      Briseida Polanco, Pharm.D, [facility], [facility]  Clinical Pharmacist  Pharmacy Services  [phone]    Continue all meds under the continuation of care with the referring provider and clinical pharmacy team.    Verbal consent to manage patient's drug therapy was obtained from the patient and/or an individual authorized to act on behalf of a patient. They were informed they may decline to participate or withdraw from participation in pharmacy services at any time.

## 2025-07-31 ENCOUNTER — INFUSION (OUTPATIENT)
Dept: INFUSION THERAPY | Facility: CLINIC | Age: 35
End: 2025-07-31
Payer: COMMERCIAL

## 2025-07-31 VITALS
OXYGEN SATURATION: 97 % | TEMPERATURE: 97.5 F | RESPIRATION RATE: 18 BRPM | DIASTOLIC BLOOD PRESSURE: 88 MMHG | HEART RATE: 89 BPM | SYSTOLIC BLOOD PRESSURE: 136 MMHG

## 2025-07-31 DIAGNOSIS — M79.7 FIBROMYALGIA: ICD-10-CM

## 2025-07-31 LAB — PREGNANCY TEST URINE, POC: NEGATIVE

## 2025-07-31 PROCEDURE — 96375 TX/PRO/DX INJ NEW DRUG ADDON: CPT | Mod: INF

## 2025-07-31 PROCEDURE — 2500000004 HC RX 250 GENERAL PHARMACY W/ HCPCS (ALT 636 FOR OP/ED): Performed by: NURSE PRACTITIONER

## 2025-07-31 PROCEDURE — 96368 THER/DIAG CONCURRENT INF: CPT | Mod: INF

## 2025-07-31 PROCEDURE — 96365 THER/PROPH/DIAG IV INF INIT: CPT | Mod: INF

## 2025-07-31 PROCEDURE — 81025 URINE PREGNANCY TEST: CPT

## 2025-07-31 RX ORDER — ALBUTEROL SULFATE 0.83 MG/ML
3 SOLUTION RESPIRATORY (INHALATION) AS NEEDED
OUTPATIENT
Start: 2025-08-14

## 2025-07-31 RX ORDER — FAMOTIDINE 10 MG/ML
20 INJECTION, SOLUTION INTRAVENOUS ONCE AS NEEDED
OUTPATIENT
Start: 2025-08-14

## 2025-07-31 RX ORDER — EPINEPHRINE 0.3 MG/.3ML
0.3 INJECTION SUBCUTANEOUS EVERY 5 MIN PRN
OUTPATIENT
Start: 2025-08-14

## 2025-07-31 RX ORDER — DIPHENHYDRAMINE HYDROCHLORIDE 50 MG/ML
25 INJECTION, SOLUTION INTRAMUSCULAR; INTRAVENOUS ONCE
OUTPATIENT
Start: 2025-08-14 | End: 2025-08-14

## 2025-07-31 RX ORDER — NITROGLYCERIN 0.4 MG/1
0.4 TABLET SUBLINGUAL ONCE
OUTPATIENT
Start: 2025-08-14 | End: 2025-08-14

## 2025-07-31 RX ORDER — HEPARIN 100 UNIT/ML
5 SYRINGE INTRAVENOUS AS NEEDED
Status: DISCONTINUED | OUTPATIENT
Start: 2025-07-31 | End: 2025-07-31 | Stop reason: HOSPADM

## 2025-07-31 RX ORDER — KETOROLAC TROMETHAMINE 30 MG/ML
30 INJECTION, SOLUTION INTRAMUSCULAR; INTRAVENOUS ONCE
Status: COMPLETED | OUTPATIENT
Start: 2025-07-31 | End: 2025-07-31

## 2025-07-31 RX ORDER — HEPARIN 100 UNIT/ML
5 SYRINGE INTRAVENOUS AS NEEDED
OUTPATIENT
Start: 2025-08-14

## 2025-07-31 RX ORDER — ONDANSETRON HYDROCHLORIDE 2 MG/ML
4 INJECTION, SOLUTION INTRAVENOUS ONCE
OUTPATIENT
Start: 2025-08-14 | End: 2025-08-14

## 2025-07-31 RX ORDER — DIPHENHYDRAMINE HYDROCHLORIDE 50 MG/ML
50 INJECTION, SOLUTION INTRAMUSCULAR; INTRAVENOUS AS NEEDED
OUTPATIENT
Start: 2025-08-14

## 2025-07-31 RX ORDER — KETOROLAC TROMETHAMINE 30 MG/ML
30 INJECTION, SOLUTION INTRAMUSCULAR; INTRAVENOUS ONCE
OUTPATIENT
Start: 2025-08-14 | End: 2025-08-14

## 2025-07-31 RX ORDER — METOPROLOL TARTRATE 25 MG/1
25 TABLET, FILM COATED ORAL ONCE
OUTPATIENT
Start: 2025-08-14 | End: 2025-08-14

## 2025-07-31 RX ORDER — METOCLOPRAMIDE HYDROCHLORIDE 5 MG/ML
10 INJECTION INTRAMUSCULAR; INTRAVENOUS ONCE
OUTPATIENT
Start: 2025-08-14 | End: 2025-08-14

## 2025-07-31 RX ORDER — ONDANSETRON HYDROCHLORIDE 2 MG/ML
4 INJECTION, SOLUTION INTRAVENOUS ONCE
Status: COMPLETED | OUTPATIENT
Start: 2025-07-31 | End: 2025-07-31

## 2025-07-31 RX ADMIN — ONDANSETRON 4 MG: 2 INJECTION, SOLUTION INTRAMUSCULAR; INTRAVENOUS at 08:13

## 2025-07-31 RX ADMIN — HEPARIN 500 UNITS: 100 SYRINGE at 09:00

## 2025-07-31 RX ADMIN — Medication: at 08:13

## 2025-07-31 RX ADMIN — KETOROLAC TROMETHAMINE 30 MG: 30 INJECTION, SOLUTION INTRAMUSCULAR at 08:13

## 2025-07-31 RX ADMIN — PROPOFOL 100 MG: 10 INJECTION, EMULSION INTRAVENOUS at 08:14

## 2025-07-31 ASSESSMENT — PAIN SCALES - GENERAL
PAINLEVEL_OUTOF10: 7
PAINLEVEL_OUTOF10: 3

## 2025-07-31 ASSESSMENT — ENCOUNTER SYMPTOMS
OCCASIONAL FEELINGS OF UNSTEADINESS: 0
DEPRESSION: 0
LOSS OF SENSATION IN FEET: 1

## 2025-07-31 ASSESSMENT — PAIN - FUNCTIONAL ASSESSMENT
PAIN_FUNCTIONAL_ASSESSMENT: 0-10
PAIN_FUNCTIONAL_ASSESSMENT: 0-10

## 2025-07-31 ASSESSMENT — PAIN DESCRIPTION - DESCRIPTORS: DESCRIPTORS: ACHING

## 2025-07-31 NOTE — PROGRESS NOTES
S: Patient here for 35th opioid sparing pain infusion. Patient reports 75% reduction in pain after last infusion that lasted 1.5 weeks.    Purpose of pain infusion meds explained along with potential side effects.  Patient verbalized understanding.    B: Pain Issues are 7/10 head, back and generalized pain. Is Patient breast feeding: no    Is Patient receiving any other form Ketamine from any other facility/ outside clinic ?: no    A: Patient currently has pain described on flow sheet documentation. Designated  is [employer] @[phone]. Patient last ate solid food 9 hours ago, and had liquid 1 hour ago.    R: Plan; Obtain IV access, do patient risk assessment, and start opioid sparing infusion as ordered. Monitoring for S/S of adverse reactions.    Post infusion teaching provided. Patient verbalized understanding. VSS, Patient states pain is 3/10. Will assist patient to waiting car via wheelchair.

## 2025-07-31 NOTE — PATIENT INSTRUCTIONS
Today :We administered ondansetron, (ketamine 30 mg, lidocaine (Xylocaine) 300 mg in sodium chloride 0.9% 518 mL IV), propofol (Diprivan) 100 mg in dextrose 5% 25 mL IV, and ketorolac.     For:   1. Fibromyalgia         Your next appointment is due in:  2 weeks        Please read the  Medication Guide that was given to you and reviewed during todays visit.     (Tell all doctors including dentists that you are taking this medication)     Go to the emergency room or call 911 if:  -You have signs of allergic reaction:   -Rash, hives, itching.   -Swollen, blistered, peeling skin.   -Swelling of face, lips, mouth, tongue or throat.   -Tightness of chest, trouble breathing, swallowing or talking     Call your doctor:  - If IV / injection site gets red, warm, swollen, itchy or leaks fluid or pus.     (Leave dressing on your IV site for at least 2 hours and keep area clean and dry  - If you get sick or have symptoms of infection or are not feeling well for any reason.    (Wash your hands often, stay away from people who are sick)  - If you have side effects from your medication that do not go away or are bothersome.     (Refer to the teaching your nurse gave you for side effects to call your doctor about)    - Common side effects may include:  stuffy nose, headache, feeling tired, muscle aches, upset stomach  - Before receiving any vaccines     - Call the [facility] at   If:  - You get sick, are on antibiotics, have had a recent vaccine, have surgery or dental work and your doctor wants your visit rescheduled.  - You need to cancel and reschedule your visit for any reason. Call at least 2 days before your visit if you need to cancel.   - Your insurance changes before your next visit.    (We will need to get approval from your new insurance. This can take up to two weeks.)     The [facility] is opened Monday thru Friday. We are closed on weekends and holidays.   Voice mail will take your  call if the center is closed. If you leave a message please allow 24 hours for a call back during weekdays. If you leave a message on a weekend/holiday, we will call you back the next business day.    A pharmacist is available Monday - Friday from 8:30AM to 3:30PM to help answer any questions you may have about your prescriptions(s). Please call pharmacy at:    [facility]: [phone]  [facility]: [phone]  [facility]: [phone]              [facility]      Pain Infusion Aftercare Instructions      1. It is normal to feel sedated, tired and low in energy after a pain infusion. DO NOT DRIVE, OPERATE ANY MACHINERY, OR MAKE ANY IMPORTANT DECISIONS FOR AT LEAST 24 HOURS AFTER THE INFUSION.     2. Call the pain center at [phone] with any problems, questions, or concerns.     3. Eat light after the infusion. If you feel queasy or sick to your stomach, laying down with your eyes closed may help. When you resume eating start with something mild like clear liquids, yogurt, applesauce, crackers, etc… Gradually advance to a regular diet.     4. Do not leave your house alone the evening of your pain infusion.     5. No alcohol or sedative medications, such as sleeping pills, for 24 hours after your pain infusion.     6. Resume all other prescribed medications unless directed otherwise by you physician.     7. If you have any medical emergencies, call 911 or go directly to the closest emergency room.    Patient Instructions  IV Infusion   [facility]      1. A responsible adult must stay with you during your infusion and drive you home. If you do not have a responsible adult with transportation home, your appointment will be rescheduled. Patients must still have a responsible adult if they use Rideshare, Uber, or Lyft. Uber, Rideshare, or Lyft drivers do not qualify.   2. On the day of your infusion we ask that you please drink clear  liquids until your appointment.  You should NOT eat food 4 hours before your infusion.    3. Take all medications as prescribed before your infusion. Do not withhold blood pressure medication.   4. Patients who use a CPAP or BIPAP should bring it to the infusion appointment.   5. Children under 16 will not be permitted in the infusion clinic. Please do not bring young children or pets. For patients who must bring a service animal, paperwork will be required. NO EXCEPTIONS.  6.  All Women will be required to take a pregnancy test prior to the infusion unless they are above 55 years of age or have had a hysterectomy.   7. Patients who cancel their infusion should call the Infusion line at [phone] as soon as possible. We would appreciate a 48-hour notice. Please be on time for the appt. Patients who are more than 15 mins late will have to cancel and reschedule. Infusion appt times are minimal. Patients who do not show, cancel, or reschedule an appointment may have a long wait until we can get them back on the schedule.   8. We make every effort to schedule your infusions based on your physician's recommendations. However, factors will affect our infusion schedule and availability. We appreciate your understanding.  9. Appointments will only be scheduled for two appointments in the future.   10. No eating, drinking, or chewing gum during the infusion.   11. If you miss or cancel your infusion appointment it is YOUR responsibility to call us and reschedule.  Please call [phone] or [phone] to reschedule or cancel appointment

## 2025-08-04 ENCOUNTER — OFFICE VISIT (OUTPATIENT)
Dept: URGENT CARE | Age: 35
End: 2025-08-04
Payer: COMMERCIAL

## 2025-08-04 VITALS
TEMPERATURE: 99.2 F | SYSTOLIC BLOOD PRESSURE: 124 MMHG | HEART RATE: 103 BPM | RESPIRATION RATE: 20 BRPM | OXYGEN SATURATION: 97 % | DIASTOLIC BLOOD PRESSURE: 73 MMHG

## 2025-08-04 DIAGNOSIS — R35.0 URINARY FREQUENCY: Primary | ICD-10-CM

## 2025-08-04 DIAGNOSIS — J02.9 ACUTE PHARYNGITIS, UNSPECIFIED ETIOLOGY: ICD-10-CM

## 2025-08-04 LAB
POC APPEARANCE, URINE: CLEAR
POC BILIRUBIN, URINE: NEGATIVE
POC BLOOD, URINE: NEGATIVE
POC COLOR, URINE: NORMAL
POC GLUCOSE, URINE: NEGATIVE MG/DL
POC HUMAN RHINOVIRUS PCR: NEGATIVE
POC INFLUENZA A VIRUS PCR: NEGATIVE
POC INFLUENZA B VIRUS PCR: NEGATIVE
POC KETONES, URINE: NEGATIVE MG/DL
POC LEUKOCYTES, URINE: NEGATIVE
POC NITRITE,URINE: NEGATIVE
POC PH, URINE: 6 PH
POC PROTEIN, URINE: NEGATIVE MG/DL
POC RESPIRATORY SYNCYTIAL VIRUS PCR: NEGATIVE
POC SPECIFIC GRAVITY, URINE: 1.02
POC STREPTOCOCCUS PYOGENES (GROUP A STREP) PCR: NEGATIVE
POC UROBILINOGEN, URINE: 0.2 EU/DL
PREGNANCY TEST URINE, POC: NEGATIVE

## 2025-08-04 PROCEDURE — 1036F TOBACCO NON-USER: CPT | Performed by: NURSE PRACTITIONER

## 2025-08-04 PROCEDURE — 81025 URINE PREGNANCY TEST: CPT | Performed by: NURSE PRACTITIONER

## 2025-08-04 PROCEDURE — 99213 OFFICE O/P EST LOW 20 MIN: CPT | Performed by: NURSE PRACTITIONER

## 2025-08-04 PROCEDURE — 87631 RESP VIRUS 3-5 TARGETS: CPT | Performed by: NURSE PRACTITIONER

## 2025-08-04 PROCEDURE — 81003 URINALYSIS AUTO W/O SCOPE: CPT | Performed by: NURSE PRACTITIONER

## 2025-08-04 NOTE — PROGRESS NOTES
Subjective   Patient ID: Sasha Longoria is a 34 y.o. female. They present today with a chief complaint of Sore Throat (X2 weeks).    History of Present Illness  Patient presents with c/o a ST that is on and off for a couple of weeks.  States the air in her house is dry but she wants to make sure she does not have an infection.  States her throat is worse at night.  She has been taking Lozenges for discomfort.    Also c/o urinary frequency.  No other urine symptoms but would like tested.      Past Medical History  Allergies as of 08/04/2025    (No Known Allergies)       Prescriptions Prior to Admission[1]     Medical History[2]    Surgical History[3]     reports that she has never smoked. She has never used smokeless tobacco. She reports current alcohol use of about 1.0 standard drink of alcohol per week. She reports that she does not currently use drugs after having used the following drugs: Marijuana. Frequency: 4.00 times per week.    Review of Systems  Review of Systems     See HPI                          Objective    Vitals:    08/04/25 1557   BP: 124/73   Pulse: 103   Resp: 20   Temp: 37.3 °C (99.2 °F)   SpO2: 97%     No LMP recorded. (Menstrual status: IUD).    Physical Exam  HENT:      Mouth/Throat:      Lips: No lesions.      Mouth: Mucous membranes are moist.      Pharynx: Oropharynx is clear. Uvula midline.      Tonsils: No tonsillar exudate or tonsillar abscesses.     Constitutional:       Appearance: Normal appearance.   Cardiovascular:      Rate and Rhythm: Normal rate and regular rhythm.      Pulses: Normal pulses.      Heart sounds: Normal heart sounds.   Pulmonary:      Effort: Pulmonary effort is normal.      Breath sounds: Normal breath sounds.       Procedures    Point of Care Test & Imaging Results from this visit  Results for orders placed or performed in visit on 08/04/25   POCT SPOTFIRE R/ST Panel Mini w/Strep A ([facility]) manually resulted   Result Value Ref Range    POC Group A  Strep, PCR Negative Negative    POC Respiratory Syncytial Virus PCR Negative Negative    POC Influenza A Virus PCR Negative Negative    POC Influenza B Virus PCR Negative Negative    POC Human Rhinovirus PCR Negative Negative   POCT UA Automated manually resulted   Result Value Ref Range    POC Color, Urine Light-Yellow Straw, Yellow, Light-Yellow    POC Appearance, Urine Clear Clear    POC Glucose, Urine NEGATIVE NEGATIVE mg/dl    POC Bilirubin, Urine NEGATIVE NEGATIVE    POC Ketones, Urine NEGATIVE NEGATIVE mg/dl    POC Specific Gravity, Urine 1.020 1.005 - 1.035    POC Blood, Urine NEGATIVE NEGATIVE    POC PH, Urine 6.0 No Reference Range Established PH    POC Protein, Urine NEGATIVE NEGATIVE mg/dl    POC Urobilinogen, Urine 0.2 0.2, 1.0 EU/DL    Poc Nitrite, Urine NEGATIVE NEGATIVE    POC Leukocytes, Urine NEGATIVE NEGATIVE   POCT pregnancy, urine manually resulted   Result Value Ref Range    Preg Test, Ur Negative Negative      Imaging  No results found.    Cardiology, Vascular, and Other Imaging  No other imaging results found for the past 2 days      Diagnostic study results (if any) were reviewed by STEPHANIE Bertrand.    Assessment/Plan   Allergies, medications, history, and pertinent labs/EKGs/Imaging reviewed by STEPHANIE Bertrand.     Medical Decision Making  All testing negative.    Diff: Viral, allergies, dry air.    At time of discharge patient was clinically well-appearing and HDS for outpatient management. The patient and/or family was educated regarding diagnosis, supportive care, OTC and Rx medications. The patient and/or family was given the opportunity to ask questions prior to discharge.  They verbalized understanding of my discussion of the plans for treatment, expected course, indications to return to  or seek further evaluation in ED, and the need for timely follow up as directed.   They were provided with a work/school excuse if requested.    Orders and Diagnoses  Diagnoses  and all orders for this visit:  Urinary frequency  -     POCT UA Automated manually resulted  -     POCT pregnancy, urine manually resulted  Acute pharyngitis, unspecified etiology  -     POCT SPOTFIRE R/ST Panel Mini w/Strep A ([facility]) manually resulted      Medical Admin Record      Patient disposition: Home    Electronically signed by STEPHANIE Bertrand  4:26 PM           [1] (Not in a hospital admission)   [2]   Past Medical History:  Diagnosis Date    Abnormal Pap smear of cervix     Anxiety     Arthritis ~ 2013    Chronic pain disorder     Depression     Eczema ~ 2005    Fibromyalgia, primary     GERD (gastroesophageal reflux disease) ~2018    Headache     Headache, tension-type     HPV (human papilloma virus) infection     Joint pain     Low back pain     Memory loss     Migraine     Neck pain     Neuromuscular disorder (Multi) 2020    Numbness     Obstructive sleep apnea     Panic attack 2020    Polycystic ovary syndrome     PTSD (post-traumatic stress disorder)     Scoliosis ~2005    Visual impairment ~2000   [3]   Past Surgical History:  Procedure Laterality Date    OTHER SURGICAL HISTORY  09/25/2019    Tonsillectomy with adenoidectomy    OTHER SURGICAL HISTORY  04/26/2022    No history of surgery    TONSILLECTOMY

## 2025-08-12 ENCOUNTER — OFFICE VISIT (OUTPATIENT)
Dept: PRIMARY CARE | Facility: CLINIC | Age: 35
End: 2025-08-12
Payer: COMMERCIAL

## 2025-08-12 VITALS
HEART RATE: 110 BPM | DIASTOLIC BLOOD PRESSURE: 78 MMHG | BODY MASS INDEX: 42.51 KG/M2 | WEIGHT: 225 LBS | OXYGEN SATURATION: 99 % | SYSTOLIC BLOOD PRESSURE: 126 MMHG

## 2025-08-12 DIAGNOSIS — S61.011A LACERATION OF RIGHT THUMB WITHOUT FOREIGN BODY WITHOUT DAMAGE TO NAIL, INITIAL ENCOUNTER: Primary | ICD-10-CM

## 2025-08-12 PROCEDURE — 1036F TOBACCO NON-USER: CPT | Performed by: STUDENT IN AN ORGANIZED HEALTH CARE EDUCATION/TRAINING PROGRAM

## 2025-08-12 PROCEDURE — 99213 OFFICE O/P EST LOW 20 MIN: CPT | Performed by: STUDENT IN AN ORGANIZED HEALTH CARE EDUCATION/TRAINING PROGRAM

## 2025-08-12 ASSESSMENT — PATIENT HEALTH QUESTIONNAIRE - PHQ9
1. LITTLE INTEREST OR PLEASURE IN DOING THINGS: NOT AT ALL
2. FEELING DOWN, DEPRESSED OR HOPELESS: NOT AT ALL
SUM OF ALL RESPONSES TO PHQ9 QUESTIONS 1 AND 2: 0

## 2025-08-12 ASSESSMENT — ENCOUNTER SYMPTOMS: DEPRESSION: 0

## 2025-08-18 ENCOUNTER — APPOINTMENT (OUTPATIENT)
Dept: ENDOCRINOLOGY | Facility: CLINIC | Age: 35
End: 2025-08-18
Payer: COMMERCIAL

## 2025-08-19 DIAGNOSIS — M79.7 FIBROMYALGIA: Primary | ICD-10-CM

## 2025-08-19 RX ORDER — KETOROLAC TROMETHAMINE 30 MG/ML
30 INJECTION, SOLUTION INTRAMUSCULAR; INTRAVENOUS ONCE
Status: CANCELLED | OUTPATIENT
Start: 2025-08-21 | End: 2025-08-21

## 2025-08-19 RX ORDER — HEPARIN 100 UNIT/ML
5 SYRINGE INTRAVENOUS AS NEEDED
Status: CANCELLED | OUTPATIENT
Start: 2025-08-21

## 2025-08-20 ENCOUNTER — APPOINTMENT (OUTPATIENT)
Dept: OBSTETRICS AND GYNECOLOGY | Facility: CLINIC | Age: 35
End: 2025-08-20
Payer: COMMERCIAL

## 2025-08-21 ENCOUNTER — INFUSION (OUTPATIENT)
Dept: INFUSION THERAPY | Facility: CLINIC | Age: 35
End: 2025-08-21
Payer: COMMERCIAL

## 2025-08-21 VITALS
SYSTOLIC BLOOD PRESSURE: 137 MMHG | DIASTOLIC BLOOD PRESSURE: 82 MMHG | TEMPERATURE: 97.9 F | RESPIRATION RATE: 20 BRPM | OXYGEN SATURATION: 97 % | HEART RATE: 93 BPM

## 2025-08-21 DIAGNOSIS — M79.7 FIBROMYALGIA: ICD-10-CM

## 2025-08-21 LAB — PREGNANCY TEST URINE, POC: NEGATIVE

## 2025-08-21 PROCEDURE — 96368 THER/DIAG CONCURRENT INF: CPT | Mod: INF

## 2025-08-21 PROCEDURE — 96375 TX/PRO/DX INJ NEW DRUG ADDON: CPT | Mod: INF

## 2025-08-21 PROCEDURE — 2500000004 HC RX 250 GENERAL PHARMACY W/ HCPCS (ALT 636 FOR OP/ED): Performed by: NURSE PRACTITIONER

## 2025-08-21 PROCEDURE — 96365 THER/PROPH/DIAG IV INF INIT: CPT | Mod: INF

## 2025-08-21 PROCEDURE — 81025 URINE PREGNANCY TEST: CPT

## 2025-08-21 RX ORDER — DIPHENHYDRAMINE HYDROCHLORIDE 50 MG/ML
25 INJECTION, SOLUTION INTRAMUSCULAR; INTRAVENOUS ONCE
OUTPATIENT
Start: 2025-09-04 | End: 2025-09-04

## 2025-08-21 RX ORDER — FAMOTIDINE 10 MG/ML
20 INJECTION, SOLUTION INTRAVENOUS ONCE AS NEEDED
OUTPATIENT
Start: 2025-09-04

## 2025-08-21 RX ORDER — KETOROLAC TROMETHAMINE 30 MG/ML
30 INJECTION, SOLUTION INTRAMUSCULAR; INTRAVENOUS ONCE
Status: CANCELLED | OUTPATIENT
Start: 2025-09-04 | End: 2025-09-04

## 2025-08-21 RX ORDER — KETOROLAC TROMETHAMINE 30 MG/ML
30 INJECTION, SOLUTION INTRAMUSCULAR; INTRAVENOUS ONCE
Status: COMPLETED | OUTPATIENT
Start: 2025-08-21 | End: 2025-08-21

## 2025-08-21 RX ORDER — HEPARIN 100 UNIT/ML
5 SYRINGE INTRAVENOUS AS NEEDED
Status: DISCONTINUED | OUTPATIENT
Start: 2025-08-21 | End: 2025-08-21 | Stop reason: HOSPADM

## 2025-08-21 RX ORDER — ALBUTEROL SULFATE 0.83 MG/ML
3 SOLUTION RESPIRATORY (INHALATION) AS NEEDED
OUTPATIENT
Start: 2025-09-04

## 2025-08-21 RX ORDER — ONDANSETRON HYDROCHLORIDE 2 MG/ML
4 INJECTION, SOLUTION INTRAVENOUS ONCE
Status: COMPLETED | OUTPATIENT
Start: 2025-08-21 | End: 2025-08-21

## 2025-08-21 RX ORDER — METOCLOPRAMIDE HYDROCHLORIDE 5 MG/ML
10 INJECTION INTRAMUSCULAR; INTRAVENOUS ONCE
OUTPATIENT
Start: 2025-09-04 | End: 2025-09-04

## 2025-08-21 RX ORDER — EPINEPHRINE 0.3 MG/.3ML
0.3 INJECTION SUBCUTANEOUS EVERY 5 MIN PRN
OUTPATIENT
Start: 2025-09-04

## 2025-08-21 RX ORDER — HEPARIN 100 UNIT/ML
5 SYRINGE INTRAVENOUS AS NEEDED
OUTPATIENT
Start: 2025-09-04

## 2025-08-21 RX ORDER — NITROGLYCERIN 0.4 MG/1
0.4 TABLET SUBLINGUAL ONCE
OUTPATIENT
Start: 2025-09-04 | End: 2025-09-04

## 2025-08-21 RX ORDER — METOPROLOL TARTRATE 25 MG/1
25 TABLET, FILM COATED ORAL ONCE
OUTPATIENT
Start: 2025-09-04 | End: 2025-09-04

## 2025-08-21 RX ORDER — DIPHENHYDRAMINE HYDROCHLORIDE 50 MG/ML
50 INJECTION, SOLUTION INTRAMUSCULAR; INTRAVENOUS AS NEEDED
OUTPATIENT
Start: 2025-09-04

## 2025-08-21 RX ORDER — ONDANSETRON HYDROCHLORIDE 2 MG/ML
4 INJECTION, SOLUTION INTRAVENOUS ONCE
OUTPATIENT
Start: 2025-09-04 | End: 2025-09-04

## 2025-08-21 RX ADMIN — ONDANSETRON 4 MG: 2 INJECTION INTRAMUSCULAR; INTRAVENOUS at 08:08

## 2025-08-21 RX ADMIN — Medication: at 07:50

## 2025-08-21 RX ADMIN — PROPOFOL 100 MG: 10 INJECTION, EMULSION INTRAVENOUS at 07:50

## 2025-08-21 RX ADMIN — KETOROLAC TROMETHAMINE 30 MG: 30 INJECTION, SOLUTION INTRAMUSCULAR at 07:44

## 2025-08-21 RX ADMIN — HEPARIN 500 UNITS: 100 SYRINGE at 08:37

## 2025-08-21 ASSESSMENT — ENCOUNTER SYMPTOMS
DEPRESSION: 1
OCCASIONAL FEELINGS OF UNSTEADINESS: 0
LOSS OF SENSATION IN FEET: 1

## 2025-08-21 ASSESSMENT — PAIN SCALES - GENERAL: PAINLEVEL_OUTOF10: 6

## 2025-08-29 ENCOUNTER — OFFICE VISIT (OUTPATIENT)
Dept: PAIN MEDICINE | Facility: CLINIC | Age: 35
End: 2025-08-29
Payer: COMMERCIAL

## 2025-08-29 VITALS
SYSTOLIC BLOOD PRESSURE: 148 MMHG | OXYGEN SATURATION: 99 % | HEART RATE: 93 BPM | RESPIRATION RATE: 16 BRPM | DIASTOLIC BLOOD PRESSURE: 84 MMHG | TEMPERATURE: 97.7 F

## 2025-08-29 DIAGNOSIS — M79.7 FIBROMYALGIA: Primary | ICD-10-CM

## 2025-08-29 PROCEDURE — 99213 OFFICE O/P EST LOW 20 MIN: CPT | Performed by: NURSE PRACTITIONER

## 2025-08-29 RX ORDER — KETOROLAC TROMETHAMINE 30 MG/ML
30 INJECTION, SOLUTION INTRAMUSCULAR; INTRAVENOUS ONCE
OUTPATIENT
Start: 2025-09-08 | End: 2025-09-08

## 2025-08-29 RX ORDER — HEPARIN 100 UNIT/ML
5 SYRINGE INTRAVENOUS AS NEEDED
OUTPATIENT
Start: 2025-09-08

## 2025-08-29 ASSESSMENT — ENCOUNTER SYMPTOMS
DIARRHEA: 0
MYALGIAS: 1
CONFUSION: 0
CHILLS: 0
LOSS OF SENSATION IN FEET: 0
PALPITATIONS: 0
CHEST TIGHTNESS: 0
NECK PAIN: 1
CONSTIPATION: 0
BACK PAIN: 1
SHORTNESS OF BREATH: 0
DEPRESSION: 0
NAUSEA: 0
ARTHRALGIAS: 1
AGITATION: 0
HEADACHES: 1
FREQUENCY: 0
OCCASIONAL FEELINGS OF UNSTEADINESS: 0
FATIGUE: 1

## 2025-08-29 ASSESSMENT — PAIN - FUNCTIONAL ASSESSMENT: PAIN_FUNCTIONAL_ASSESSMENT: 0-10

## 2025-08-29 ASSESSMENT — PAIN SCALES - GENERAL
PAINLEVEL_OUTOF10: 6
PAINLEVEL_OUTOF10: 6

## 2025-08-29 ASSESSMENT — PAIN DESCRIPTION - DESCRIPTORS: DESCRIPTORS: ACHING

## 2025-09-08 ENCOUNTER — APPOINTMENT (OUTPATIENT)
Dept: PHARMACY | Facility: HOSPITAL | Age: 35
End: 2025-09-08
Payer: COMMERCIAL

## 2025-09-09 ENCOUNTER — APPOINTMENT (OUTPATIENT)
Dept: NEUROLOGY | Facility: CLINIC | Age: 35
End: 2025-09-09
Payer: COMMERCIAL

## 2025-09-10 ENCOUNTER — APPOINTMENT (OUTPATIENT)
Dept: BEHAVIORAL HEALTH | Facility: CLINIC | Age: 35
End: 2025-09-10
Payer: COMMERCIAL

## 2025-10-07 ENCOUNTER — APPOINTMENT (OUTPATIENT)
Dept: DERMATOLOGY | Facility: CLINIC | Age: 35
End: 2025-10-07
Payer: COMMERCIAL

## 2025-11-18 ENCOUNTER — APPOINTMENT (OUTPATIENT)
Dept: OBSTETRICS AND GYNECOLOGY | Facility: CLINIC | Age: 35
End: 2025-11-18
Payer: COMMERCIAL